# Patient Record
Sex: FEMALE | Race: BLACK OR AFRICAN AMERICAN | Employment: FULL TIME | URBAN - METROPOLITAN AREA
[De-identification: names, ages, dates, MRNs, and addresses within clinical notes are randomized per-mention and may not be internally consistent; named-entity substitution may affect disease eponyms.]

---

## 2021-06-24 ENCOUNTER — TELEPHONE (OUTPATIENT)
Dept: SURGERY | Age: 66
End: 2021-06-24

## 2021-06-24 NOTE — TELEPHONE ENCOUNTER
Per the order of Dr. Willie Delarosa, patient has been scheduled for EUS with possible ERCP on 7.9.2021. Patient provided with procedure information over the phone and informed that written information will be mailed to her. .  Patient instructed to please contact our office with any questions. Phone call placed to the office of Dr. Christina Henderson to inform them that patient is scheduled for her procedure. Surgery scheduling form faxed to [address] [place] [address] surgery scheduling and fax confirmation received. Dr. Willie Delarosa to enter orders.     Electronically signed by Lucius Ulloa on 6/24/21 at 2:48 PM EDT

## 2021-06-24 NOTE — TELEPHONE ENCOUNTER
Prior Authorization Form:      DEMOGRAPHICS:                     Patient Name:  Yenni De La Torre  Patient :  1955            Insurance:  Payor: Ming Garcia 150 / Plan: [facility] / Product Type: *No Product type* / Note: This is the primary coverage, but no account was found for this location or the patient's primary location. Insurance ID Number:    Payor/Plan Subscr  Sex Relation Sub. Ins. ID Effective Group Num   1.  [facility] 1955 Female Self [id number] 1/1/15 [id number]                                    [id number]         DIAGNOSIS & PROCEDURE:                       Procedure/Operation: EUS possible ERCP           CPT Code: 41534    Diagnosis:  Dilation of bile duct    ICD10 Code: K83.9    Location:  [address] [place] [address]    Surgeon:  Nate Chong    SCHEDULING INFORMATION:                          Date: 2021    Time: TBD              Anesthesia:  MAC/TIVA                                                       Status:  Outpatient        Special Comments:         Electronically signed by Jocelyne Martinez on 2021 at 2:48 PM

## 2021-07-06 ENCOUNTER — HOSPITAL ENCOUNTER (OUTPATIENT)
Dept: PREADMISSION TESTING | Age: 66
Discharge: HOME | End: 2021-07-06
Payer: COMMERCIAL

## 2021-07-06 VITALS
WEIGHT: 185 LBS | SYSTOLIC BLOOD PRESSURE: 179 MMHG | OXYGEN SATURATION: 98 % | TEMPERATURE: 96.9 F | RESPIRATION RATE: 16 BRPM | DIASTOLIC BLOOD PRESSURE: 84 MMHG | BODY MASS INDEX: 34.93 KG/M2 | HEIGHT: 61 IN | HEART RATE: 63 BPM

## 2021-07-06 DIAGNOSIS — Z01.818 PREOP TESTING: ICD-10-CM

## 2021-07-06 LAB
ALBUMIN SERPL-MCNC: 3.9 G/DL (ref 3.5–5.2)
ALP BLD-CCNC: 95 U/L (ref 35–104)
ALT SERPL-CCNC: 14 U/L (ref 0–32)
ANION GAP SERPL CALCULATED.3IONS-SCNC: 8 MMOL/L (ref 7–16)
AST SERPL-CCNC: 19 U/L (ref 0–31)
BILIRUB SERPL-MCNC: 0.3 MG/DL (ref 0–1.2)
BUN BLDV-MCNC: 28 MG/DL (ref 6–23)
CALCIUM SERPL-MCNC: 9.3 MG/DL (ref 8.6–10.2)
CHLORIDE BLD-SCNC: 108 MMOL/L (ref 98–107)
CO2: 23 MMOL/L (ref 22–29)
CREAT SERPL-MCNC: 2.3 MG/DL (ref 0.5–1)
EKG ATRIAL RATE: 65 BPM
EKG P AXIS: 38 DEGREES
EKG P-R INTERVAL: 122 MS
EKG Q-T INTERVAL: 452 MS
EKG QRS DURATION: 96 MS
EKG QTC CALCULATION (BAZETT): 470 MS
EKG R AXIS: -45 DEGREES
EKG T AXIS: 81 DEGREES
EKG VENTRICULAR RATE: 65 BPM
GFR AFRICAN AMERICAN: 26
GFR NON-AFRICAN AMERICAN: 26 ML/MIN/1.73
GLUCOSE BLD-MCNC: 136 MG/DL (ref 74–99)
HCT VFR BLD CALC: 36.5 % (ref 34–48)
HEMOGLOBIN: 11.4 G/DL (ref 11.5–15.5)
MCH RBC QN AUTO: 27 PG (ref 26–35)
MCHC RBC AUTO-ENTMCNC: 31.2 % (ref 32–34.5)
MCV RBC AUTO: 86.5 FL (ref 80–99.9)
PDW BLD-RTO: 14.1 FL (ref 11.5–15)
PLATELET # BLD: 258 E9/L (ref 130–450)
PMV BLD AUTO: 11.3 FL (ref 7–12)
POTASSIUM REFLEX MAGNESIUM: 4.6 MMOL/L (ref 3.5–5)
RBC # BLD: 4.22 E12/L (ref 3.5–5.5)
SODIUM BLD-SCNC: 139 MMOL/L (ref 132–146)
TOTAL PROTEIN: 7.2 G/DL (ref 6.4–8.3)
WBC # BLD: 4.4 E9/L (ref 4.5–11.5)

## 2021-07-06 PROCEDURE — 93005 ELECTROCARDIOGRAM TRACING: CPT | Performed by: ANESTHESIOLOGY

## 2021-07-06 PROCEDURE — 80053 COMPREHEN METABOLIC PANEL: CPT

## 2021-07-06 PROCEDURE — 85027 COMPLETE CBC AUTOMATED: CPT

## 2021-07-06 PROCEDURE — 36415 COLL VENOUS BLD VENIPUNCTURE: CPT

## 2021-07-06 RX ORDER — ISOSORBIDE DINITRATE 10 MG/1
10 TABLET ORAL 2 TIMES DAILY
COMMUNITY
End: 2022-04-13

## 2021-07-06 RX ORDER — SPIRONOLACTONE 25 MG/1
25 TABLET ORAL DAILY
COMMUNITY

## 2021-07-06 RX ORDER — ASPIRIN 81 MG/1
81 TABLET ORAL DAILY
COMMUNITY

## 2021-07-06 RX ORDER — BUMETANIDE 1 MG/1
1 TABLET ORAL PRN
COMMUNITY

## 2021-07-06 RX ORDER — HYDRALAZINE HYDROCHLORIDE 25 MG/1
25 TABLET, FILM COATED ORAL 2 TIMES DAILY
COMMUNITY
End: 2022-04-13

## 2021-07-06 ASSESSMENT — PAIN DESCRIPTION - PAIN TYPE: TYPE: CHRONIC PAIN

## 2021-07-06 ASSESSMENT — PAIN SCALES - GENERAL: PAINLEVEL_OUTOF10: 3

## 2021-07-06 ASSESSMENT — PAIN DESCRIPTION - LOCATION: LOCATION: ABDOMEN

## 2021-07-06 ASSESSMENT — PAIN DESCRIPTION - ORIENTATION: ORIENTATION: MID

## 2021-07-06 ASSESSMENT — PAIN DESCRIPTION - DESCRIPTORS: DESCRIPTORS: ACHING

## 2021-07-06 NOTE — PROGRESS NOTES
[address] [facility]                                                                                                                    PRE OP INSTRUCTIONS FOR  Amanda Robertson        Date: 7/6/2021    Date of surgery: 7/9/21   Arrival Time: Hospital will call you between 5pm and 7pm with your final arrival time for surgery    1. Do not eat or drink anything after midnight prior to surgery. This includes no water, chewing gum, mints or ice chips. 2. Take the following medications with a small sip of water on the morning of Surgery: isosorbide,metoprolol, hydralazine     3. Diabetics may take evening dose of insulin but none after midnight. If you feel symptomatic or low blood sugar morning of surgery drink 1-2 ounces of apple juice only. 4. Aspirin, Ibuprofen, Advil, Naproxen, Vitamin E and other Anti-inflammatory products should be stopped  before surgery  as directed by your physician. Take Tylenol only unless instructed otherwise by your surgeon. 5. Check with your Doctor regarding stopping Plavix, Coumadin, Lovenox, Eliquis, Effient, or other blood thinners. 6. Do not smoke,use illicit drugs and do not drink any alcoholic beverages 24 hours prior to surgery. 7. You may brush your teeth the morning of surgery. DO NOT SWALLOW WATER    8. You MUST make arrangements for a responsible adult to take you home after your surgery. You will not be allowed to leave alone or drive yourself home. It is strongly suggested someone stay with you the first 24 hrs. Your surgery will be cancelled if you do not have a ride home. 9. PEDIATRIC PATIENTS ONLY:  A parent/legal guardian must accompany a child scheduled for surgery and plan to stay at the hospital until the child is discharged. Please do not bring other children with you.     10. Please wear simple, loose fitting clothing to the hospital.  Do not bring valuables (money, credit cards, checkbooks, etc.) Do not wear any makeup (including no eye makeup) or nail polish on your fingers or toes. 11. DO NOT wear any jewelry or piercings on day of surgery. All body piercing jewelry must be removed. Shower the night before surgery with _x__Antibacterial soap /OSKAR WIPES________. May use deodorant. No creams lotions or powders from the neck down. 12. TOTAL JOINT REPLACEMENT/HYSTERECTOMY PATIENTS ONLY---Remember to bring Blood Bank bracelet to the hospital on the day of surgery. 13. If you have a Living Will and Durable Power of  for Healthcare, please bring in a copy. 14. If appropriate bring crutches, inspirex, WALKER, CANE etc...    13. Notify your Surgeon if you develop any illness between now and surgery time, cough, cold, fever, sore throat, nausea, vomiting, etc.  Please notify your surgeon if you experience dizziness, shortness of breath or blurred vision between now & the time of your surgery. 16. If you have ___dentures, they will be removed before going to the OR; we will provide you a container. If you wear ___contact lenses or ___glasses, they will be removed; please bring a case for them. 17. To provide excellent care visitors will be limited to 1 in the room at any given time. 18. Please bring picture ID and insurance card. 19. Sleep apnea patients need to bring CPAP AND SETTINGS to hospital on day of surgery. 20. During flu season no children under the age of 15 are permitted in the hospital for the safety of all patients. 21. Other Please check in at the information desk/main lobby. Please wear a mask. Please call AMBULATORY CARE if you have any further questions.    [address] [facility]     [address]

## 2021-07-09 ENCOUNTER — HOSPITAL ENCOUNTER (OUTPATIENT)
Dept: GENERAL RADIOLOGY | Age: 66
Discharge: HOME | End: 2021-07-11
Attending: SURGERY
Payer: COMMERCIAL

## 2021-07-09 ENCOUNTER — ANESTHESIA (OUTPATIENT)
Dept: OPERATING ROOM | Age: 66
End: 2021-07-09
Payer: COMMERCIAL

## 2021-07-09 ENCOUNTER — HOSPITAL ENCOUNTER (OUTPATIENT)
Age: 66
Setting detail: OUTPATIENT SURGERY
Discharge: HOME | End: 2021-07-09
Attending: SURGERY | Admitting: SURGERY
Payer: COMMERCIAL

## 2021-07-09 ENCOUNTER — ANESTHESIA EVENT (OUTPATIENT)
Dept: OPERATING ROOM | Age: 66
End: 2021-07-09
Payer: COMMERCIAL

## 2021-07-09 VITALS
RESPIRATION RATE: 20 BRPM | BODY MASS INDEX: 34.93 KG/M2 | DIASTOLIC BLOOD PRESSURE: 78 MMHG | TEMPERATURE: 96.5 F | OXYGEN SATURATION: 97 % | WEIGHT: 185 LBS | HEART RATE: 71 BPM | SYSTOLIC BLOOD PRESSURE: 149 MMHG | HEIGHT: 61 IN

## 2021-07-09 VITALS
RESPIRATION RATE: 14 BRPM | SYSTOLIC BLOOD PRESSURE: 89 MMHG | DIASTOLIC BLOOD PRESSURE: 45 MMHG | OXYGEN SATURATION: 90 %

## 2021-07-09 DIAGNOSIS — K83.1 BILIARY OBSTRUCTION: ICD-10-CM

## 2021-07-09 LAB — METER GLUCOSE: 134 MG/DL (ref 74–99)

## 2021-07-09 PROCEDURE — C1769 GUIDE WIRE: HCPCS | Performed by: SURGERY

## 2021-07-09 PROCEDURE — 2580000003 HC RX 258: Performed by: ANESTHESIOLOGY

## 2021-07-09 PROCEDURE — 6360000002 HC RX W HCPCS: Performed by: NURSE ANESTHETIST, CERTIFIED REGISTERED

## 2021-07-09 PROCEDURE — 3700000000 HC ANESTHESIA ATTENDED CARE: Performed by: SURGERY

## 2021-07-09 PROCEDURE — 2720000010 HC SURG SUPPLY STERILE: Performed by: SURGERY

## 2021-07-09 PROCEDURE — 7100000010 HC PHASE II RECOVERY - FIRST 15 MIN: Performed by: SURGERY

## 2021-07-09 PROCEDURE — C1753 CATH, INTRAVAS ULTRASOUND: HCPCS | Performed by: SURGERY

## 2021-07-09 PROCEDURE — 3700000001 HC ADD 15 MINUTES (ANESTHESIA): Performed by: SURGERY

## 2021-07-09 PROCEDURE — 43238 EGD US FINE NEEDLE BX/ASPIR: CPT | Performed by: SURGERY

## 2021-07-09 PROCEDURE — 3600007513: Performed by: SURGERY

## 2021-07-09 PROCEDURE — 7100000011 HC PHASE II RECOVERY - ADDTL 15 MIN: Performed by: SURGERY

## 2021-07-09 PROCEDURE — 3600007503: Performed by: SURGERY

## 2021-07-09 PROCEDURE — 2709999900 HC NON-CHARGEABLE SUPPLY: Performed by: SURGERY

## 2021-07-09 PROCEDURE — 99203 OFFICE O/P NEW LOW 30 MIN: CPT | Performed by: SURGERY

## 2021-07-09 PROCEDURE — 82962 GLUCOSE BLOOD TEST: CPT

## 2021-07-09 RX ORDER — SODIUM CHLORIDE 9 MG/ML
INJECTION, SOLUTION INTRAVENOUS CONTINUOUS
Status: DISCONTINUED | OUTPATIENT
Start: 2021-07-09 | End: 2021-07-09 | Stop reason: HOSPADM

## 2021-07-09 RX ORDER — PROPOFOL 10 MG/ML
INJECTION, EMULSION INTRAVENOUS CONTINUOUS PRN
Status: DISCONTINUED | OUTPATIENT
Start: 2021-07-09 | End: 2021-07-09 | Stop reason: SDUPTHER

## 2021-07-09 RX ORDER — SODIUM CHLORIDE 9 MG/ML
25 INJECTION, SOLUTION INTRAVENOUS PRN
Status: DISCONTINUED | OUTPATIENT
Start: 2021-07-09 | End: 2021-07-09 | Stop reason: HOSPADM

## 2021-07-09 RX ORDER — MIDAZOLAM HYDROCHLORIDE 1 MG/ML
INJECTION INTRAMUSCULAR; INTRAVENOUS PRN
Status: DISCONTINUED | OUTPATIENT
Start: 2021-07-09 | End: 2021-07-09 | Stop reason: SDUPTHER

## 2021-07-09 RX ORDER — SODIUM CHLORIDE 0.9 % (FLUSH) 0.9 %
5-40 SYRINGE (ML) INJECTION EVERY 12 HOURS SCHEDULED
Status: DISCONTINUED | OUTPATIENT
Start: 2021-07-09 | End: 2021-07-09 | Stop reason: HOSPADM

## 2021-07-09 RX ORDER — FENTANYL CITRATE 50 UG/ML
INJECTION, SOLUTION INTRAMUSCULAR; INTRAVENOUS PRN
Status: DISCONTINUED | OUTPATIENT
Start: 2021-07-09 | End: 2021-07-09 | Stop reason: SDUPTHER

## 2021-07-09 RX ORDER — SODIUM CHLORIDE 0.9 % (FLUSH) 0.9 %
5-40 SYRINGE (ML) INJECTION PRN
Status: DISCONTINUED | OUTPATIENT
Start: 2021-07-09 | End: 2021-07-09 | Stop reason: HOSPADM

## 2021-07-09 RX ADMIN — PROPOFOL INJECTABLE EMULSION 100 MCG/KG/MIN: 10 INJECTION, EMULSION INTRAVENOUS at 12:26

## 2021-07-09 RX ADMIN — SODIUM CHLORIDE: 9 INJECTION, SOLUTION INTRAVENOUS at 11:51

## 2021-07-09 RX ADMIN — FENTANYL CITRATE 50 MCG: 50 INJECTION, SOLUTION INTRAMUSCULAR; INTRAVENOUS at 12:26

## 2021-07-09 RX ADMIN — FENTANYL CITRATE 50 MCG: 50 INJECTION, SOLUTION INTRAMUSCULAR; INTRAVENOUS at 12:31

## 2021-07-09 RX ADMIN — MIDAZOLAM 2 MG: 1 INJECTION INTRAMUSCULAR; INTRAVENOUS at 12:21

## 2021-07-09 ASSESSMENT — PULMONARY FUNCTION TESTS
PIF_VALUE: 1
PIF_VALUE: 3
PIF_VALUE: 1
PIF_VALUE: 2
PIF_VALUE: 1
PIF_VALUE: 0
PIF_VALUE: 1

## 2021-07-09 ASSESSMENT — PAIN SCALES - GENERAL
PAINLEVEL_OUTOF10: 0
PAINLEVEL_OUTOF10: 0

## 2021-07-09 ASSESSMENT — LIFESTYLE VARIABLES: SMOKING_STATUS: 0

## 2021-07-09 ASSESSMENT — PAIN DESCRIPTION - DESCRIPTORS: DESCRIPTORS: DISCOMFORT

## 2021-07-09 ASSESSMENT — PAIN - FUNCTIONAL ASSESSMENT: PAIN_FUNCTIONAL_ASSESSMENT: 0-10

## 2021-07-09 NOTE — ANESTHESIA PRE PROCEDURE
Department of Anesthesiology  Preprocedure Note       Name:  Mckay Berg   Age:  77 y.o.  :  1955                                          MRN:  [id number]         Date:  2021      Surgeon: Grace Santamaria):  China Ribeiro MD    Procedure: Procedure(s):  EGD W/EUS FNA, POSSIBLE ERCP (CPT 94210)  ERCP ENDOSCOPIC RETROGRADE CHOLANGIOPANCREATOGRAPHY    Medications prior to admission:   Prior to Admission medications    Medication Sig Start Date End Date Taking? Authorizing Provider   NONFORMULARY Supplement for hot flashes    Historical Provider, MD   aspirin 81 MG EC tablet Take 81 mg by mouth daily    Historical Provider, MD   Sacubitril-Valsartan (ENTRESTO PO) Take by mouth 2 times daily    Historical Provider, MD   metoprolol tartrate (LOPRESSOR) 25 MG tablet Take 25 mg by mouth 2 times daily    Historical Provider, MD   bumetanide (BUMEX) 1 MG tablet Take 1 mg by mouth as needed    Historical Provider, MD   spironolactone (ALDACTONE) 25 MG tablet Take 25 mg by mouth daily    Historical Provider, MD   isosorbide dinitrate (ISORDIL) 10 MG tablet Take 10 mg by mouth 2 times daily    Historical Provider, MD   hydrALAZINE (APRESOLINE) 25 MG tablet Take 25 mg by mouth 2 times daily    Historical Provider, MD   metFORMIN ER (GLUCOPHAGE-XR) 500 MG XR tablet Take 1 tablet by mouth 2 times daily  16   Historical Provider, MD   glimepiride (AMARYL) 4 MG tablet Take 1 tablet by mouth 2 times daily (before meals) 16  Jelena Saini MD   simvastatin (ZOCOR) 40 MG tablet Take 1 tablet by mouth nightly 11/6/15 11/21/16  Jelena Saini MD       Current medications:    No current facility-administered medications for this encounter. Allergies:     Allergies   Allergen Reactions    Penicillins Swelling     Mouth, hands and fingers       Problem List:    Patient Active Problem List   Diagnosis Code    Diabetes mellitus ([facility] E11.9    Hypertension I10    Asthma J45.909    Hyperlipidemia E78.5    Vitamin D deficiency E55.9    Microalbuminuria due to type 2 diabetes mellitus ([facility] E11.29, R80.9    Enteritis K52.9    Intractable vomiting R11.10       Past Medical History:        Diagnosis Date    Arthritis     Asthma     Diabetes mellitus ([facility]     GERD (gastroesophageal reflux disease)     Hyperlipidemia     Hypertension     Immune deficiency disorder ([facility]     MI, old 2018    Microalbuminuria due to type 2 diabetes mellitus ([facility] 3/3/2016    Vitamin D deficiency 3/3/2016       Past Surgical History:        Procedure Laterality Date    BREAST SURGERY      reduction    lumpectomy    COLONOSCOPY      FRACTURE SURGERY      right thumb and tailbone    HYSTERECTOMY      PACEMAKER PLACEMENT         Social History:    Social History     Tobacco Use    Smoking status: Never Smoker    Smokeless tobacco: Never Used   Substance Use Topics    Alcohol use: Yes     Alcohol/week: 1.0 standard drinks     Types: 1 Glasses of wine per week     Comment: social                                Counseling given: Not Answered      Vital Signs (Current): There were no vitals filed for this visit.                                            BP Readings from Last 3 Encounters:   07/06/21 (!) 179/84   11/22/16 138/78   03/30/16 (!) 170/98       NPO Status:                                                                                 BMI:   Wt Readings from Last 3 Encounters:   07/06/21 185 lb (83.9 kg)   11/22/16 196 lb 3.2 oz (89 kg)   03/30/16 186 lb 6.4 oz (84.6 kg)     There is no height or weight on file to calculate BMI.    CBC:   Lab Results   Component Value Date    WBC 4.4 07/06/2021    RBC 4.22 07/06/2021    HGB 11.4 07/06/2021    HCT 36.5 07/06/2021    MCV 86.5 07/06/2021    RDW 14.1 07/06/2021     07/06/2021       CMP:   Lab Results   Component Value Date     07/06/2021    K 4.6 07/06/2021     07/06/2021    CO2 23 07/06/2021    BUN 28 07/06/2021    CREATININE 2.3 07/06/2021    GFRAA 26 07/06/2021    LABGLOM 26 07/06/2021    GLUCOSE 136 07/06/2021    PROT 7.2 07/06/2021    CALCIUM 9.3 07/06/2021    BILITOT 0.3 07/06/2021    ALKPHOS 95 07/06/2021    AST 19 07/06/2021    ALT 14 07/06/2021       POC Tests: No results for input(s): POCGLU, POCNA, POCK, POCCL, POCBUN, POCHEMO, POCHCT in the last 72 hours. Coags: No results found for: PROTIME, INR, APTT    HCG (If Applicable): No results found for: PREGTESTUR, PREGSERUM, HCG, HCGQUANT     ABGs: No results found for: PHART, PO2ART, CBB7VRL, ALG2CKO, BEART, P9BFUCKF     Type & Screen (If Applicable):  No results found for: LABABO, LABRH    Drug/Infectious Status (If Applicable):  No results found for: HIV, HEPCAB    COVID-19 Screening (If Applicable): No results found for: COVID19        Anesthesia Evaluation  Patient summary reviewed no history of anesthetic complications:   Airway: Mallampati: II  TM distance: >3 FB   Neck ROM: full  Mouth opening: > = 3 FB Dental: normal exam         Pulmonary: breath sounds clear to auscultation  (+) asthma:     (-) not a current smoker                           Cardiovascular:    (+) hypertension:, pacemaker: pacemaker and AICD, past MI:, CHF:, hyperlipidemia        Rhythm: regular  Rate: normal                    Neuro/Psych:   Negative Neuro/Psych ROS              GI/Hepatic/Renal:   (+) GERD:,          ROS comment: Enteritis  Intractable vomiting. Endo/Other:    (+) DiabetesType II DM, , .                  ROS comment: Immune deficiency disorder Abdominal:   (+) obese,           Vascular: negative vascular ROS. Other Findings:           Anesthesia Plan      MAC     ASA 3       Induction: intravenous. Anesthetic plan and risks discussed with patient. Plan discussed with CRNA.                   Adonis Villegas MD   7/9/2021

## 2021-07-09 NOTE — OP NOTE
Endoscopic Ultrasound Procedure Note    Date of Procedure: 7/9/2021    Pre-procedure Diagnosis: dilated cbd    Post-procedure Diagnosis: dilated cbd    Physician: Pepito Morley MD    Assistant: None    Estimated Blood Loss: Estimated amount of blood loss is none    Anesthesia: LMAC     Complications: None    Indications and History:  The patient is a 77 y.o. female. The risks, benefits, complications, treatment options and expected outcomes were discussed with the patient. The possibilities of reaction to medication, pulmonary aspiration, perforation of the gastrointestinal tract, bleeding requiring transfusion or operation, respiratory failure requiring placement on a ventilator and failure to diagnose a condition were discussed with the patient who freely signed the consent. Description of Procedure: The patient was taken to the endoscopy suite, identified as Gautam Xavier and the procedure verified as Endoscopic Ultrasound (EUS). A Time Out was held and the above information confirmed. The patient was positioned in the left lateral position with an oral bite block and anesthesia was provided for sedation and comfort. The echoendoscope was passed to the duodenum. EGD/EUS findings:   Esophagus: normal   Stomach: normal   Duodenum: normal   Pancreas: normal.  Mildly dilated pancreatic duct to about 3.5 mm in the pancreatic head. Normal tapering of the duct throughout the neck body and tail of the pancreas. No evidence of solid or cystic masses throughout the pancreas. Bile Duct: normal.  Specifically, there is no evidence of external compression, neoplasm, stricture, or stone in the common bile duct. The common bile duct is normal in caliber and measures about 6 mm throughout the duct. Gallbladder: Gallbladder polyps. No stones      Specimens:  1. none    The Patient was taken to the Endoscopy Recovery area in satisfactory condition.       Electronically signed by Pepito Morley MD on 7/9/2021 at 12:54 PM

## 2021-07-09 NOTE — ANESTHESIA POSTPROCEDURE EVALUATION
Department of Anesthesiology  Postprocedure Note    Patient: Jarvis Roberson  MRN: [id number]  YOB: 1955  Date of evaluation: 7/9/2021  Time:  1:50 PM     Procedure Summary     Date: 07/09/21 Room / Location: [address] [place] [address] [address] [employer] [facility]    Anesthesia Start: 9401 Anesthesia Stop: 7862    Procedure: EGD ESOPHAGOGASTRODUODENOSCOPY ULTRASOUND (N/A ) Diagnosis: (DILATION OF BILE DUCT)    Surgeons: Renan Polanco MD Responsible Provider: Mark Palafox MD    Anesthesia Type: MAC ASA Status: 3          Anesthesia Type: MAC    Shannan Phase I: Shannan Score: 10    Shannan Phase II: Shannan Score: 10    Last vitals: Reviewed and per EMR flowsheets.        Anesthesia Post Evaluation    Patient location during evaluation: PACU  Patient participation: complete - patient participated  Level of consciousness: awake  Airway patency: patent  Nausea & Vomiting: no nausea and no vomiting  Complications: no  Cardiovascular status: hemodynamically stable  Respiratory status: acceptable  Hydration status: stable

## 2021-07-09 NOTE — H&P
General Surgery History and Physical  [facility]    Patient's Name/Date of Birth: Yoon Franco / 1955    Date: July 9, 2021     Surgeon: Nito Gooden MD    PCP: Darlys Meckel, DO     Chief Complaint: dilated CBD    HPI:   Yoon Franco is a 77 y.o. female who presents for evaluation of dilated CBD. She was admitted with crushing chest pain and imaging revealed dilated CBD. She was referred for EUS with possible ercp. She denies nausea, vomiting, constipation, diarrhea, headache, chest pain, shortness of breath, fevers, chills. Patient Active Problem List   Diagnosis    Diabetes mellitus ([address]    Hypertension    Asthma    Hyperlipidemia    Vitamin D deficiency    Microalbuminuria due to type 2 diabetes mellitus ([address]    Enteritis    Intractable vomiting       Past Medical History:   Diagnosis Date    Arthritis     Asthma     Diabetes mellitus ([address]     GERD (gastroesophageal reflux disease)     Hyperlipidemia     Hypertension     Immune deficiency disorder ([address]     MI, old 2018    Microalbuminuria due to type 2 diabetes mellitus ([facility] 3/3/2016    Vitamin D deficiency 3/3/2016       Past Surgical History:   Procedure Laterality Date    BREAST SURGERY      reduction    lumpectomy    COLONOSCOPY      FRACTURE SURGERY      right thumb and tailbone    HYSTERECTOMY      PACEMAKER PLACEMENT         Allergies   Allergen Reactions    Penicillins Swelling     Mouth, hands and fingers       The patient has a family history that is negative for severe cardiovascular or respiratory issues, negative for reaction to anesthesia. Time spent reviewing past medical, surgical, social and family history, vitals, nursing assessment and images. No changes from above documented history.     Social History     Socioeconomic History    Marital status: Single     Spouse name: Not on file    Number of children: Not on file    Years of education: Not on file    Highest education level: Not on file   Occupational History    Occupation: Supervisor   Tobacco Use    Smoking status: Never Smoker    Smokeless tobacco: Never Used   Substance and Sexual Activity    Alcohol use: Yes     Alcohol/week: 1.0 standard drinks     Types: 1 Glasses of wine per week     Comment: social    Drug use: No    Sexual activity: Yes     Partners: Male   Other Topics Concern    Not on file   Social History Narrative    Not on file     Social Determinants of Health     Financial Resource Strain:     Difficulty of Paying Living Expenses:    Food Insecurity:     Worried About Running Out of Food in the Last Year:     [address] in the Last Year:    Transportation Needs:     Lack of Transportation (Medical):  Lack of Transportation (Non-Medical):    Physical Activity:     Days of Exercise per Week:     Minutes of Exercise per Session:    Stress:     Feeling of Stress :    Social Connections:     Frequency of Communication with Friends and Family:     Frequency of Social Gatherings with Friends and Family:     Attends [employer] Services:     Active Member of Clubs or Organizations:     Attends Club or Organization Meetings:     Marital Status:    Intimate Partner Violence:     Fear of Current or Ex-Partner:     Emotionally Abused:     Physically Abused:     Sexually Abused:        I have reviewed relevant labs from this admission and interpretation is included in my assessment and plan    Review of Systems    A complete 10 system review was performed and are otherwise negative unless mentioned in the above HPI. Specific negatives are listed below but may not include all those reviewed.     General ROS: negative obtundation, AMS  ENT ROS: negative rhinorrhea, epistaxis  Allergy and Immunology ROS: negative itchy/watery eyes or nasal congestion  Hematological and Lymphatic ROS: negative spontaneous bleeding or bruising  Endocrine ROS: negative  lethargy, mood swings, palpitations or polydipsia/polyuria  Respiratory ROS: negative sputum changes, stridor, tachypnea or wheezing  Cardiovascular ROS: negative for - loss of consciousness, murmur or orthopnea  Gastrointestinal ROS: negative for - hematochezia or hematemesis  Genito-Urinary ROS: negative for -  genital discharge or hematuria  Musculoskeletal ROS: negative for - focal weakness, gangrene  Psych/Neuro ROS: negative for - visual or auditory hallucinations, suicidal ideation    Physical exam:   BP (!) 189/88   Pulse 80   Temp 97.5 °F (36.4 °C) (Infrared)   Resp 16   Ht 5' 1\" (1.549 m)   Wt 185 lb (83.9 kg)   SpO2 95%   BMI 34.96 kg/m²   General appearance:  NAD, appears stated age  Head: NCAT, PERRLA, EOMI, red conjunctiva  Neck: supple, no masses, trachea midline  Lungs: Equal chest rise bilateral, no retractions, no wheezing  Heart: Reg rate  Abdomen: soft, nontender , nondistended  Skin; warm and dry, no cyanosis  Gu: no cva tenderness  Extremities: atraumatic, no focal motor deficits, no open wounds  Psych: No tremor, visual hallucinations      Radiology: RUQ US: dilated CBD    Assessment:  Oni Jorgensen is a 77 y.o. female with dilated CBD  Patient Active Problem List   Diagnosis    Diabetes mellitus ([address]    Hypertension    Asthma    Hyperlipidemia    Vitamin D deficiency    Microalbuminuria due to type 2 diabetes mellitus (HCC)    Enteritis    Intractable vomiting         Plan:  Proceed with EUS  -The procedure, risks, benefits and alternatives were discussed with patient. she  agrees to proceed.         Homar Noel MD  12:22 PM  7/9/2021

## 2021-07-09 NOTE — PROGRESS NOTES
7/9/21 1350 reviewed discharge instructions with pt and her brother lizzy.  Both verbalized understanding, signed in agreement and given copy. betsy sotelo

## 2021-10-22 ENCOUNTER — TELEPHONE (OUTPATIENT)
Dept: SURGERY | Age: 66
End: 2021-10-22

## 2021-11-09 ENCOUNTER — INITIAL CONSULT (OUTPATIENT)
Dept: SURGERY | Age: 66
End: 2021-11-09
Payer: COMMERCIAL

## 2021-11-09 VITALS
TEMPERATURE: 97.8 F | HEART RATE: 80 BPM | SYSTOLIC BLOOD PRESSURE: 157 MMHG | BODY MASS INDEX: 34.74 KG/M2 | DIASTOLIC BLOOD PRESSURE: 92 MMHG | RESPIRATION RATE: 18 BRPM | WEIGHT: 184 LBS | HEIGHT: 61 IN | OXYGEN SATURATION: 98 %

## 2021-11-09 DIAGNOSIS — K83.8 DILATED BILE DUCT: Primary | ICD-10-CM

## 2021-11-09 PROCEDURE — G8484 FLU IMMUNIZE NO ADMIN: HCPCS | Performed by: SURGERY

## 2021-11-09 PROCEDURE — 4040F PNEUMOC VAC/ADMIN/RCVD: CPT | Performed by: SURGERY

## 2021-11-09 PROCEDURE — 1036F TOBACCO NON-USER: CPT | Performed by: SURGERY

## 2021-11-09 PROCEDURE — G8400 PT W/DXA NO RESULTS DOC: HCPCS | Performed by: SURGERY

## 2021-11-09 PROCEDURE — 1090F PRES/ABSN URINE INCON ASSESS: CPT | Performed by: SURGERY

## 2021-11-09 PROCEDURE — G8427 DOCREV CUR MEDS BY ELIG CLIN: HCPCS | Performed by: SURGERY

## 2021-11-09 PROCEDURE — G8419 CALC BMI OUT NRM PARAM NOF/U: HCPCS | Performed by: SURGERY

## 2021-11-09 PROCEDURE — 1123F ACP DISCUSS/DSCN MKR DOCD: CPT | Performed by: SURGERY

## 2021-11-09 PROCEDURE — 3017F COLORECTAL CA SCREEN DOC REV: CPT | Performed by: SURGERY

## 2021-11-09 PROCEDURE — 99213 OFFICE O/P EST LOW 20 MIN: CPT | Performed by: SURGERY

## 2021-11-09 RX ORDER — IPRATROPIUM BROMIDE 42 UG/1
SPRAY, METERED NASAL
COMMUNITY
Start: 2021-10-05

## 2021-11-09 RX ORDER — GLIPIZIDE 10 MG/1
TABLET, FILM COATED, EXTENDED RELEASE ORAL
COMMUNITY
Start: 2021-10-21 | End: 2022-04-13

## 2021-11-09 RX ORDER — METOPROLOL SUCCINATE 50 MG/1
TABLET, EXTENDED RELEASE ORAL
COMMUNITY
Start: 2020-12-24

## 2021-11-09 RX ORDER — ORAL SEMAGLUTIDE 3 MG/1
TABLET ORAL
COMMUNITY
End: 2022-04-13

## 2021-11-09 RX ORDER — EMPAGLIFLOZIN 25 MG/1
TABLET, FILM COATED ORAL
COMMUNITY
End: 2022-04-13

## 2021-11-09 NOTE — PROGRESS NOTES
General Surgery History and Physical  [facility]    Patient's Name/Date of Birth: Soco Garcia / 1955    Date: November 9, 2021     Surgeon: Harmony Ceron MD    PCP: Debra Lewis DO     Chief Complaint: Dilated bile duct    HPI:   Soco Garcia is a 77 y.o. female who presents for evaluation of epigastric or right upper quadrant abdominal pain for several months. This was pretty severe 6 months ago and had gotten better however the pain is not coming back. She has had 2 ultrasounds performed in the last 6 months that both reveal a distended gallbladder with a dilated common bile duct to 10 mm. No evidence of stones or other abnormality on the ultrasound. She reports the pain is worse when she bends down however she does report some bloating and gas after eating. Patient Active Problem List   Diagnosis    Diabetes mellitus ([address]    Hypertension    Asthma    Hyperlipidemia    Vitamin D deficiency    Microalbuminuria due to type 2 diabetes mellitus ([address]    Enteritis    Intractable vomiting    Dilated cbd, acquired       Past Medical History:   Diagnosis Date    Arthritis     Asthma     Diabetes mellitus ([address]     GERD (gastroesophageal reflux disease)     Hyperlipidemia     Hypertension     Immune deficiency disorder ([address]     MI, old 2018    Microalbuminuria due to type 2 diabetes mellitus ([address] 03/03/2016    Mitral valve regurgitation     Vitamin D deficiency 03/03/2016       Past Surgical History:   Procedure Laterality Date    BREAST SURGERY      reduction    lumpectomy    COLONOSCOPY      FRACTURE SURGERY      right thumb and tailbone    HYSTERECTOMY      PACEMAKER PLACEMENT      UPPER GASTROINTESTINAL ENDOSCOPY N/A 7/9/2021    EGD ESOPHAGOGASTRODUODENOSCOPY ULTRASOUND performed by Harmony Ceron MD at [facility]   Allergen Reactions    Latex      Other reaction(s):  Other: See Comments  Some issues w latex condoms, although cyanosis  Gu: no cva tenderness  Extremities: atraumatic, no focal motor deficits, no open wounds  Psych: No tremor, visual hallucinations      Radiology: I reviewed relevant abdominal imaging from this admission and that available in the EMR including RUQ from June and October 2021.  My assessment is dilated common bile duct    Assessment:  Bradford Rosenbaum is a 77 y.o. female with dilated common bile duct, rule out choledocholithiasis or other ampullary abnormality  Patient Active Problem List   Diagnosis    Diabetes mellitus ([address]    Hypertension    Asthma    Hyperlipidemia    Vitamin D deficiency    Microalbuminuria due to type 2 diabetes mellitus (HCC)    Enteritis    Intractable vomiting    Dilated cbd, acquired         Plan:  Check MRCP  Follow-up with me after MRCP  Patient understands that if pain returns and is severe she develops jaundice that she will go to the emergency room    Jefe Cho MD  8:41 AM  11/9/2021

## 2021-12-03 ENCOUNTER — HOSPITAL ENCOUNTER (OUTPATIENT)
Age: 66
Discharge: HOME | End: 2021-12-03
Payer: COMMERCIAL

## 2021-12-03 LAB
ANION GAP SERPL CALCULATED.3IONS-SCNC: 12 MMOL/L (ref 7–16)
BUN BLDV-MCNC: 38 MG/DL (ref 6–23)
CALCIUM SERPL-MCNC: 8.7 MG/DL (ref 8.6–10.2)
CHLORIDE BLD-SCNC: 104 MMOL/L (ref 98–107)
CO2: 24 MMOL/L (ref 22–29)
CREAT SERPL-MCNC: 2 MG/DL (ref 0.5–1)
GFR AFRICAN AMERICAN: 30
GFR NON-AFRICAN AMERICAN: 30 ML/MIN/1.73
GLUCOSE BLD-MCNC: 182 MG/DL (ref 74–99)
POTASSIUM SERPL-SCNC: 4.5 MMOL/L (ref 3.5–5)
SODIUM BLD-SCNC: 140 MMOL/L (ref 132–146)

## 2021-12-03 PROCEDURE — 36415 COLL VENOUS BLD VENIPUNCTURE: CPT

## 2021-12-03 PROCEDURE — 80048 BASIC METABOLIC PNL TOTAL CA: CPT

## 2021-12-06 ENCOUNTER — HOSPITAL ENCOUNTER (OUTPATIENT)
Dept: MRI IMAGING | Age: 66
Discharge: HOME | End: 2021-12-08
Payer: COMMERCIAL

## 2021-12-06 DIAGNOSIS — K83.8 DILATED BILE DUCT: ICD-10-CM

## 2021-12-06 PROCEDURE — A9577 INJ MULTIHANCE: HCPCS | Performed by: RADIOLOGY

## 2021-12-06 PROCEDURE — 6360000004 HC RX CONTRAST MEDICATION: Performed by: RADIOLOGY

## 2021-12-06 PROCEDURE — 74183 MRI ABD W/O CNTR FLWD CNTR: CPT

## 2021-12-06 RX ADMIN — GADOBENATE DIMEGLUMINE 17 ML: 529 INJECTION, SOLUTION INTRAVENOUS at 13:44

## 2021-12-06 NOTE — PROGRESS NOTES
Patient with pacemaker needed to be monitored during MRI. Patient monitored during test, vital signs stable. Patient tolerated well.

## 2021-12-14 ENCOUNTER — OFFICE VISIT (OUTPATIENT)
Dept: SURGERY | Age: 66
End: 2021-12-14
Payer: COMMERCIAL

## 2021-12-14 VITALS
HEIGHT: 61 IN | WEIGHT: 184 LBS | RESPIRATION RATE: 18 BRPM | OXYGEN SATURATION: 97 % | BODY MASS INDEX: 34.74 KG/M2 | DIASTOLIC BLOOD PRESSURE: 90 MMHG | SYSTOLIC BLOOD PRESSURE: 170 MMHG | TEMPERATURE: 97.2 F | HEART RATE: 79 BPM

## 2021-12-14 DIAGNOSIS — R10.13 EPIGASTRIC PAIN: Primary | ICD-10-CM

## 2021-12-14 PROCEDURE — 1036F TOBACCO NON-USER: CPT | Performed by: SURGERY

## 2021-12-14 PROCEDURE — 3017F COLORECTAL CA SCREEN DOC REV: CPT | Performed by: SURGERY

## 2021-12-14 PROCEDURE — 1090F PRES/ABSN URINE INCON ASSESS: CPT | Performed by: SURGERY

## 2021-12-14 PROCEDURE — G8417 CALC BMI ABV UP PARAM F/U: HCPCS | Performed by: SURGERY

## 2021-12-14 PROCEDURE — G8427 DOCREV CUR MEDS BY ELIG CLIN: HCPCS | Performed by: SURGERY

## 2021-12-14 PROCEDURE — 1123F ACP DISCUSS/DSCN MKR DOCD: CPT | Performed by: SURGERY

## 2021-12-14 PROCEDURE — 4040F PNEUMOC VAC/ADMIN/RCVD: CPT | Performed by: SURGERY

## 2021-12-14 PROCEDURE — G8400 PT W/DXA NO RESULTS DOC: HCPCS | Performed by: SURGERY

## 2021-12-14 PROCEDURE — 99212 OFFICE O/P EST SF 10 MIN: CPT | Performed by: SURGERY

## 2021-12-14 PROCEDURE — G8484 FLU IMMUNIZE NO ADMIN: HCPCS | Performed by: SURGERY

## 2021-12-14 NOTE — PROGRESS NOTES
General Surgery Office Note  [facility] General Surgery  Consandre OMARI Dominguez MD    Patient's Name/Date of Birth: Alisha Vicente / 1955    Date: December 14, 2021     Surgeon: Dalia Dominguez MD    Chief Complaint:   Chief Complaint   Patient presents with    Follow-up     follow up after MRCP       Patient Active Problem List   Diagnosis    Diabetes mellitus ([address]    Hypertension    Asthma    Hyperlipidemia    Vitamin D deficiency    Microalbuminuria due to type 2 diabetes mellitus ([address]    Enteritis    Intractable vomiting    Dilated cbd, acquired       Subjective: Has persistent epigastric abdominal pain. MRI of the abdomen did not reveal any filling defect or soft tissue mass in the distal common bile duct of the pancreatic head. Has never had HIDA scan or upper endoscopy. Objective:  BP (!) 170/90 (Site: Left Upper Arm, Position: Sitting, Cuff Size: Medium Adult)   Pulse 79   Temp 97.2 °F (36.2 °C) (Temporal)   Resp 18   Ht 5' 1\" (1.549 m)   Wt 184 lb (83.5 kg)   SpO2 97%   BMI 34.77 kg/m²   Labs:  No results for input(s): WBC, HGB, HCT in the last 72 hours. Invalid input(s): PLR  Lab Results   Component Value Date    CREATININE 2.0 (H) 12/03/2021    BUN 38 (H) 12/03/2021     12/03/2021    K 4.5 12/03/2021     12/03/2021    CO2 24 12/03/2021     No results for input(s): LIPASE, AMYLASE in the last 72 hours.   CBC:   Lab Results   Component Value Date    WBC 4.4 07/06/2021    RBC 4.22 07/06/2021    HGB 11.4 07/06/2021    HCT 36.5 07/06/2021    MCV 86.5 07/06/2021    MCH 27.0 07/06/2021    MCHC 31.2 07/06/2021    RDW 14.1 07/06/2021     07/06/2021    MPV 11.3 07/06/2021     CMP:    Lab Results   Component Value Date     12/03/2021    K 4.5 12/03/2021    K 4.6 07/06/2021     12/03/2021    CO2 24 12/03/2021    BUN 38 12/03/2021    CREATININE 2.0 12/03/2021    GFRAA 30 12/03/2021    LABGLOM 30 12/03/2021    GLUCOSE 182 12/03/2021    PROT 7.2 07/06/2021    LABALBU 3.9 07/06/2021    CALCIUM 8.7 12/03/2021    BILITOT 0.3 07/06/2021    ALKPHOS 95 07/06/2021    AST 19 07/06/2021    ALT 14 07/06/2021       General appearance: AA, NAD  HEENT: NCAT, PERRLA, EOMI  Lungs: Clear, equal rise bilateral  Heart: Reg  Abdomen: soft, nondistended, nontender,   Skin: No lesions   Psych: No distress, conversive, no hallucinations  : No ulcers or lesions  Rectal: No bleeding    A complete 10 system review was performed and are otherwise negative unless mentioned in the above HPI. Specific negatives are listed below but may not include all those reviewed. General ROS: negative obtundation, AMS  ENT ROS: negative rhinorrhea, epistaxis  Allergy and Immunology ROS: negative itchy/watery eyes or nasal congestion  Hematological and Lymphatic ROS: negative spontaneous bleeding or bruising  Endocrine ROS: negative  lethargy, mood swings, palpitations or polydipsia/polyuria  Respiratory ROS: negative sputum changes, stridor, tachypnea or wheezing  Cardiovascular ROS: negative for - loss of consciousness, murmur or orthopnea  Gastrointestinal ROS: negative for - hematochezia or hematemesis  Genito-Urinary ROS: negative for -  genital discharge or hematuria  Musculoskeletal ROS: negative for - focal weakness, gangrene  Psych/Neuro ROS: negative for - visual or auditory hallucinations, suicidal ideation      Time spent reviewing past medical, surgical, social and family history, vitals, nursing assessment and images. Imaging:  MRI abdomen  Impression   1. Mild extrahepatic and minimal to mild intrahepatic biliary dilation are   unchanged compared to the prior studies from 2016, suggesting that this is   normal for the patient.  No additional follow-up is recommended unless there   are clinical findings of cholestasis, in which case ERCP could be considered.    2. Findings suggestive of primary hemochromatosis with evaluation of the   liver limited by superimposed steatosis of at least mild (but potentially   greater) severity.  No associated findings of cirrhosis.          Pathology: n/a    Assessment/Plan:  Bradford Rosenbaum is a 77 y.o. female epigastric abdominal pain, dilated common bile duct, rule out biliary dyskinesia versus sphincter of Oddi dysfunction    Check HIDA scan with CCK  Follow-up after HIDA scan  Also discussed upper endoscopy to rule out hiatal hernia if symptoms persist and HIDA is negative    Physician Signature: Electronically signed by Dr. Rodriguez Camera  12/14/2021  8:27 AM

## 2021-12-30 ENCOUNTER — HOSPITAL ENCOUNTER (OUTPATIENT)
Dept: NUCLEAR MEDICINE | Age: 66
Discharge: HOME | End: 2021-12-30
Payer: COMMERCIAL

## 2021-12-30 VITALS — WEIGHT: 184 LBS | BODY MASS INDEX: 34.77 KG/M2

## 2021-12-30 DIAGNOSIS — R10.13 EPIGASTRIC PAIN: ICD-10-CM

## 2021-12-30 PROCEDURE — 78227 HEPATOBIL SYST IMAGE W/DRUG: CPT

## 2021-12-30 PROCEDURE — 3430000000 HC RX DIAGNOSTIC RADIOPHARMACEUTICAL: Performed by: RADIOLOGY

## 2021-12-30 PROCEDURE — 2580000003 HC RX 258: Performed by: RADIOLOGY

## 2021-12-30 PROCEDURE — A9537 TC99M MEBROFENIN: HCPCS | Performed by: RADIOLOGY

## 2021-12-30 PROCEDURE — 6360000002 HC RX W HCPCS: Performed by: RADIOLOGY

## 2021-12-30 RX ADMIN — SODIUM CHLORIDE 1.67 MCG: 9 INJECTION, SOLUTION INTRAVENOUS at 09:58

## 2021-12-30 RX ADMIN — Medication 6 MILLICURIE: at 08:31

## 2022-01-04 ENCOUNTER — OFFICE VISIT (OUTPATIENT)
Dept: SURGERY | Age: 67
End: 2022-01-04
Payer: COMMERCIAL

## 2022-01-04 VITALS
OXYGEN SATURATION: 97 % | SYSTOLIC BLOOD PRESSURE: 166 MMHG | HEIGHT: 61 IN | DIASTOLIC BLOOD PRESSURE: 84 MMHG | BODY MASS INDEX: 34.74 KG/M2 | TEMPERATURE: 97.2 F | WEIGHT: 184 LBS | HEART RATE: 60 BPM | RESPIRATION RATE: 18 BRPM

## 2022-01-04 DIAGNOSIS — K82.8 BILIARY DYSKINESIA: Primary | ICD-10-CM

## 2022-01-04 PROCEDURE — 99212 OFFICE O/P EST SF 10 MIN: CPT | Performed by: SURGERY

## 2022-01-04 PROCEDURE — 1036F TOBACCO NON-USER: CPT | Performed by: SURGERY

## 2022-01-04 PROCEDURE — 4040F PNEUMOC VAC/ADMIN/RCVD: CPT | Performed by: SURGERY

## 2022-01-04 PROCEDURE — 1090F PRES/ABSN URINE INCON ASSESS: CPT | Performed by: SURGERY

## 2022-01-04 PROCEDURE — 3017F COLORECTAL CA SCREEN DOC REV: CPT | Performed by: SURGERY

## 2022-01-04 PROCEDURE — G8400 PT W/DXA NO RESULTS DOC: HCPCS | Performed by: SURGERY

## 2022-01-04 PROCEDURE — 1123F ACP DISCUSS/DSCN MKR DOCD: CPT | Performed by: SURGERY

## 2022-01-04 PROCEDURE — G8417 CALC BMI ABV UP PARAM F/U: HCPCS | Performed by: SURGERY

## 2022-01-04 PROCEDURE — G8427 DOCREV CUR MEDS BY ELIG CLIN: HCPCS | Performed by: SURGERY

## 2022-01-04 PROCEDURE — G8484 FLU IMMUNIZE NO ADMIN: HCPCS | Performed by: SURGERY

## 2022-01-04 NOTE — PROGRESS NOTES
General Surgery History and Physical  [facility]    Patient's Name/Date of Birth: Cornelio Francisco / 1955    Date: January 4, 2022     Surgeon: Jim Emery MD    PCP: Xander Barbosa DO     Chief Complaint: Right upper quadrant pain    HPI:   Cornelio Francisco is a 77 y.o. female who presents for evaluation of right upper quadrant pain. Timing is intermittent, radiation to back, alleviated by npo and started several weeks ago. Denies SOB, chest pain, fever, chills, diarrhea, constipation. MRCP showed a dilated common bile duct however no other abnormality. HIDA scan showed EF of 12%.       Past Medical History:   Diagnosis Date    Arthritis     Asthma     Diabetes mellitus ([facility]     GERD (gastroesophageal reflux disease)     Hyperlipidemia     Hypertension     Immune deficiency disorder ([facility]     MI, old 2018    Microalbuminuria due to type 2 diabetes mellitus ([facility] 03/03/2016    Mitral valve regurgitation     Vitamin D deficiency 03/03/2016       Past Surgical History:   Procedure Laterality Date    BREAST SURGERY      reduction    lumpectomy    COLONOSCOPY      FRACTURE SURGERY      right thumb and tailbone    HYSTERECTOMY      PACEMAKER PLACEMENT      UPPER GASTROINTESTINAL ENDOSCOPY N/A 7/9/2021    EGD ESOPHAGOGASTRODUODENOSCOPY ULTRASOUND performed by Jim Emery MD at [address]       Current Outpatient Medications   Medication Sig Dispense Refill    empagliflozin (JARDIANCE) 25 MG tablet       glipiZIDE (GLUCOTROL XL) 10 MG extended release tablet one po q day with food      ipratropium (ATROVENT) 0.06 % nasal spray use 1 to 2 sprays in each nostril every 8 hours as needed for sinus congestion and drainage      metoprolol succinate (TOPROL XL) 50 MG extended release tablet TAKE 1 & 1/2 TABLETS BY MOUTH TWICE DAILY      Semaglutide (RYBELSUS) 3 MG TABS TAKE ONE TABLET BY MOUTH EVERY MORNING 30 minutes prior to first meal (start 7 mg when done with 3 mg) Social History Narrative    Not on file     Social Determinants of Health     Financial Resource Strain:     Difficulty of Paying Living Expenses: Not on file   Food Insecurity:     Worried About Running Out of Food in the Last Year: Not on file    Kate of Food in the Last Year: Not on file   Transportation Needs:     Lack of Transportation (Medical): Not on file    Lack of Transportation (Non-Medical): Not on file   Physical Activity:     Days of Exercise per Week: Not on file    Minutes of Exercise per Session: Not on file   Stress:     Feeling of Stress : Not on file   Social Connections:     Frequency of Communication with Friends and Family: Not on file    Frequency of Social Gatherings with Friends and Family: Not on file    Attends Church Services: Not on file    Active Member of [address] [place] [address] or Organizations: Not on file    Attends Club or Organization Meetings: Not on file    Marital Status: Not on file   Intimate Partner Violence:     Fear of Current or Ex-Partner: Not on file    Emotionally Abused: Not on file    Physically Abused: Not on file    Sexually Abused: Not on file   Housing Stability:     Unable to Pay for Housing in the Last Year: Not on file    Number of [place] in the Last Year: Not on file    Unstable Housing in the Last Year: Not on file         Review of Systems    A complete 10 system review was performed and are otherwise negative unless mentioned in the above HPI. Specific negatives are listed below but may not include all those reviewed.     General ROS: negative obtundation, AMS  ENT ROS: negative rhinorrhea, epistaxis  Allergy and Immunology ROS: negative itchy/watery eyes or nasal congestion  Hematological and Lymphatic ROS: negative spontaneous bleeding or bruising  Endocrine ROS: negative  lethargy, mood swings, palpitations or polydipsia/polyuria  Respiratory ROS: negative sputum changes, stridor, tachypnea or wheezing  Cardiovascular ROS: negative for - loss of consciousness, murmur or orthopnea  Gastrointestinal ROS: negative for - hematochezia or hematemesis  Genito-Urinary ROS: negative for -  genital discharge or hematuria  Musculoskeletal ROS: negative for - focal weakness, gangrene  Psych/Neuro ROS: negative for - visual or auditory hallucinations, suicidal ideation    Physical exam:   BP (!) 166/84 (Site: Left Upper Arm, Position: Sitting, Cuff Size: Medium Adult)   Pulse 60   Temp 97.2 °F (36.2 °C) (Temporal)   Resp 18   Ht 5' 1\" (1.549 m)   Wt 184 lb (83.5 kg)   SpO2 97%   BMI 34.77 kg/m²   General appearance:  NAD, appears stated age  Head: NCAT, PERRLA, EOMI, red conjunctiva  Neck: supple, no masses, trachea midline  Lungs: Equal chest rise bilateral, no retractions, no wheezing  Heart: Reg rate  Abdomen: soft, nontender, nondistended  Skin; warm and dry, no cyanosis  Gu: no cva tenderness  Extremities: atraumatic, no focal motor deficits, no open wounds  Psych: No tremor, visual hallucinations        Radiology: I reviewed relevant abdominal imaging from this admission and that available in the EMR including HIDA scan from 12/30/2021. My assessment is biliary dyskinesia      Assessment:  77 y.o. female with biliary dyskinesia    Plan: To OR for laparoscopic cholecystectomy with intraoperative cholangiogram  Discussed risks of injury to liver, common bile duct, hepatic duct, surrounding vascular structures, small bowel, stomach. Risk for further surgery to correct complications.   Plan for laparoscopic, possible open cholecystectomy with possible intraoperative cholangiogram. Patient agrees and all questions answered to their and family's satisfaction      Jia Donohue MD  12:24 PM  1/4/2022

## 2022-02-22 ENCOUNTER — TELEPHONE (OUTPATIENT)
Dept: SURGERY | Age: 67
End: 2022-02-22

## 2022-02-22 NOTE — TELEPHONE ENCOUNTER
Per the order of Dr. Osvaldo Martinez, patient has been scheduled for laparoscopic cholecystectomy with cholangiogram on 4.14.2022. Patient provided with procedure information over the phone and informed that written information will also be mailed to her. .  Patient instructed to please contact our office with any questions. Procedure scheduled through iQueue. Dr. Osvaldo Martinez to enter orders.     Electronically signed by Doris Guo on 2/22/22 at 3:58 PM EST

## 2022-02-22 NOTE — TELEPHONE ENCOUNTER
Prior Authorization Form:      DEMOGRAPHICS:                     Patient Name:  Sindi Lloyd  Patient :  1955            Insurance:  Payor: Siri Hendrickson / [facility] / Product Type: *No Product type* /   Insurance ID Number:    Payor/Plan Subscr  Sex Relation Sub.  Ins. ID Effective Group Num   1. Prosper HOROWITZ 1955 Female Self [id number]                                   [id number]         DIAGNOSIS & PROCEDURE:                       Procedure/Operation: Laparoscopic cholecystectomy with cholangiogram           CPT Code: 58888    Diagnosis:  Biliary Dyskinesia    ICD10 Code: K82.8    Location:  [address] [place] [address]    Surgeon:  Claudia Hardy INFORMATION:                          Date: 2022    Time: TBD              Anesthesia:  General                                                       Status:  Outpatient        Special Comments:         Electronically signed by Ike Lopez on 2022 at 3:58 PM

## 2022-03-21 ENCOUNTER — TELEPHONE (OUTPATIENT)
Dept: SURGERY | Age: 67
End: 2022-03-21

## 2022-03-21 NOTE — TELEPHONE ENCOUNTER
Pt called in regards to FMLA paperwork dropped off last week. Advised  was QUYEN and will sign paperwork at earliest convenience. Pt verbalized understanding.

## 2022-04-11 RX ORDER — SODIUM CHLORIDE 9 MG/ML
INJECTION, SOLUTION INTRAVENOUS CONTINUOUS
Status: CANCELLED | OUTPATIENT
Start: 2022-04-14

## 2022-04-13 ENCOUNTER — HOSPITAL ENCOUNTER (OUTPATIENT)
Dept: PREADMISSION TESTING | Age: 67
Discharge: HOME | End: 2022-04-13
Payer: COMMERCIAL

## 2022-04-13 ENCOUNTER — ANESTHESIA EVENT (OUTPATIENT)
Dept: OPERATING ROOM | Age: 67
End: 2022-04-13
Payer: COMMERCIAL

## 2022-04-13 VITALS
BODY MASS INDEX: 34.93 KG/M2 | OXYGEN SATURATION: 97 % | HEIGHT: 61 IN | RESPIRATION RATE: 18 BRPM | SYSTOLIC BLOOD PRESSURE: 150 MMHG | WEIGHT: 185 LBS | TEMPERATURE: 97 F | HEART RATE: 86 BPM | DIASTOLIC BLOOD PRESSURE: 80 MMHG

## 2022-04-13 DIAGNOSIS — K82.8 BILIARY DYSKINESIA: Primary | ICD-10-CM

## 2022-04-13 LAB
ANION GAP SERPL CALCULATED.3IONS-SCNC: 9 MMOL/L (ref 7–16)
BUN BLDV-MCNC: 46 MG/DL (ref 6–23)
CALCIUM SERPL-MCNC: 9.3 MG/DL (ref 8.6–10.2)
CHLORIDE BLD-SCNC: 107 MMOL/L (ref 98–107)
CO2: 23 MMOL/L (ref 22–29)
CREAT SERPL-MCNC: 2.2 MG/DL (ref 0.5–1)
EKG ATRIAL RATE: 77 BPM
EKG P AXIS: 50 DEGREES
EKG P-R INTERVAL: 136 MS
EKG Q-T INTERVAL: 412 MS
EKG QRS DURATION: 106 MS
EKG QTC CALCULATION (BAZETT): 466 MS
EKG R AXIS: -53 DEGREES
EKG T AXIS: 71 DEGREES
EKG VENTRICULAR RATE: 77 BPM
GFR AFRICAN AMERICAN: 27
GFR NON-AFRICAN AMERICAN: 27 ML/MIN/1.73
GLUCOSE BLD-MCNC: 203 MG/DL (ref 74–99)
HCT VFR BLD CALC: 37.4 % (ref 34–48)
HEMOGLOBIN: 11.4 G/DL (ref 11.5–15.5)
MCH RBC QN AUTO: 26.8 PG (ref 26–35)
MCHC RBC AUTO-ENTMCNC: 30.5 % (ref 32–34.5)
MCV RBC AUTO: 87.8 FL (ref 80–99.9)
PDW BLD-RTO: 13.7 FL (ref 11.5–15)
PLATELET # BLD: 211 E9/L (ref 130–450)
PMV BLD AUTO: 11.2 FL (ref 7–12)
POTASSIUM REFLEX MAGNESIUM: 5 MMOL/L (ref 3.5–5)
RBC # BLD: 4.26 E12/L (ref 3.5–5.5)
SODIUM BLD-SCNC: 139 MMOL/L (ref 132–146)
WBC # BLD: 4.7 E9/L (ref 4.5–11.5)

## 2022-04-13 PROCEDURE — 36415 COLL VENOUS BLD VENIPUNCTURE: CPT

## 2022-04-13 PROCEDURE — 80048 BASIC METABOLIC PNL TOTAL CA: CPT

## 2022-04-13 PROCEDURE — 93005 ELECTROCARDIOGRAM TRACING: CPT | Performed by: ANESTHESIOLOGY

## 2022-04-13 PROCEDURE — 85027 COMPLETE CBC AUTOMATED: CPT

## 2022-04-13 NOTE — H&P
General Surgery History and Physical  [facility]    Patient's Name/Date of Birth: Lavell Mcdaniel / 1955    Date: April 13, 2022     Surgeon: Rachel Canela MD    PCP: Miguel Lindsey DO     Chief Complaint: Right upper quadrant pain    HPI:   Lavell Mcdaniel is a 79 y.o. female who presents for evaluation of right upper quadrant pain. Timing is intermittent, radiation to back, alleviated by npo and started several weeks ago. Denies SOB, chest pain, fever, chills, diarrhea, constipation. MRCP showed a dilated common bile duct however no other abnormality. HIDA scan showed EF of 12%. Past Medical History:   Diagnosis Date    Arthritis     Asthma     Diabetes mellitus ([facility] [address]     GERD (gastroesophageal reflux disease)     Hyperlipidemia     Hypertension     Immune deficiency disorder ([facility] [facility]     MI, old 2018    Microalbuminuria due to type 2 diabetes mellitus ([facility] [address] [facility] 03/03/2016    Mitral valve regurgitation     Vitamin D deficiency 03/03/2016       Past Surgical History:   Procedure Laterality Date    BREAST SURGERY      reduction    lumpectomy    COLONOSCOPY      FRACTURE SURGERY      right thumb and tailbone    HYSTERECTOMY      PACEMAKER PLACEMENT  [id number]    defibr[facility]ator at [facility] N/A 7/9/2021    EGD ESOPHAGOGASTRODUODENOSCOPY ULTRASOUND performed by Rachel Canela MD at [address]       No current facility-administered medications for this encounter.      Current Outpatient Medications   Medication Sig Dispense Refill    ipratropium (ATROVENT) 0.06 % nasal spray use 1 to 2 sprays in each nostril every 8 hours as needed for sinus congestion and drainage      metoprolol succinate (TOPROL XL) 50 MG extended release tablet TAKE 1 & 1/2 TABLETS BY MOUTH TWICE DAILY      NONFORMULARY Supplement for hot flashes   cream      aspirin 81 MG EC tablet Take 81 mg by mouth daily      Sacubitril-Valsartan (ENTRESTO PO) Take by mouth 2 times daily      bumetanide (BUMEX) 1 MG tablet Take 1 mg by mouth as needed      spironolactone (ALDACTONE) 25 MG tablet Take 25 mg by mouth daily      metFORMIN ER (GLUCOPHAGE-XR) 500 MG XR tablet Take 1 tablet by mouth 2 times daily       simvastatin (ZOCOR) 40 MG tablet Take 1 tablet by mouth nightly (Patient not taking: Reported on 11/9/2021) 30 tablet 3       Allergies   Allergen Reactions    Latex      Other reaction(s): Other: See Comments  Some issues w latex condoms, although no issues with other sources of latex exposure.  Penicillins Swelling     Mouth, hands and fingers       The patient has a family history that is negative for severe cardiovascular or respiratory issues, negative for reaction to anesthesia. Social History     Socioeconomic History    Marital status: Single     Spouse name: Not on file    Number of children: Not on file    Years of education: Not on file    Highest education level: Not on file   Occupational History    Occupation: Supervisor   Tobacco Use    Smoking status: Never Smoker    Smokeless tobacco: Never Used   Substance and Sexual Activity    Alcohol use: Yes     Alcohol/week: 1.0 standard drink     Types: 1 Glasses of wine per week     Comment: social    Drug use: No    Sexual activity: Yes     Partners: Male   Other Topics Concern    Not on file   Social History Narrative    Not on file     Social Determinants of Health     Financial Resource Strain:     Difficulty of Paying Living Expenses: Not on file   Food Insecurity:     Worried About Running Out of Food in the Last Year: Not on file    Kate of Food in the Last Year: Not on file   Transportation Needs:     Lack of Transportation (Medical): Not on file    Lack of Transportation (Non-Medical):  Not on file   Physical Activity:     Days of Exercise per Week: Not on file    Minutes of Exercise per Session: Not on file   Stress:     Feeling of Stress : Not on file   Social Connections:     Frequency of Communication with Friends and Family: Not on file    Frequency of Social Gatherings with Friends and Family: Not on file    Attends Restoration Services: Not on file    Active Member of Clubs or Organizations: Not on file    Attends Club or Organization Meetings: Not on file    Marital Status: Not on file   Intimate Partner Violence:     Fear of Current or Ex-Partner: Not on file    Emotionally Abused: Not on file    Physically Abused: Not on file    Sexually Abused: Not on file   Housing Stability:     Unable to Pay for Housing in the Last Year: Not on file    Number of [place] in the Last Year: Not on file    Unstable Housing in the Last Year: Not on file         Review of Systems    A complete 10 system review was performed and are otherwise negative unless mentioned in the above HPI. Specific negatives are listed below but may not include all those reviewed. General ROS: negative obtundation, AMS  ENT ROS: negative rhinorrhea, epistaxis  Allergy and Immunology ROS: negative itchy/watery eyes or nasal congestion  Hematological and Lymphatic ROS: negative spontaneous bleeding or bruising  Endocrine ROS: negative  lethargy, mood swings, palpitations or polydipsia/polyuria  Respiratory ROS: negative sputum changes, stridor, tachypnea or wheezing  Cardiovascular ROS: negative for - loss of consciousness, murmur or orthopnea  Gastrointestinal ROS: negative for - hematochezia or hematemesis  Genito-Urinary ROS: negative for -  genital discharge or hematuria  Musculoskeletal ROS: negative for - focal weakness, gangrene  Psych/Neuro ROS: negative for - visual or auditory hallucinations, suicidal ideation    Physical exam:   There were no vitals taken for this visit.   General appearance:  NAD, appears stated age  Head: NCAT, PERRLA, EOMI, red conjunctiva  Neck: supple, no masses, trachea midline  Lungs: Equal chest rise bilateral, no retractions, no wheezing  Heart: Reg rate  Abdomen: soft, nontender, nondistended  Skin; warm and dry, no cyanosis  Gu: no cva tenderness  Extremities: atraumatic, no focal motor deficits, no open wounds  Psych: No tremor, visual hallucinations        Radiology: I reviewed relevant abdominal imaging from this admission and that available in the EMR including HIDA scan from 12/30/2021. My assessment is biliary dyskinesia      Assessment:  79 y.o. female with biliary dyskinesia    Plan: To OR for laparoscopic cholecystectomy with intraoperative cholangiogram  Discussed risks of injury to liver, common bile duct, hepatic duct, surrounding vascular structures, small bowel, stomach. Risk for further surgery to correct complications.   Plan for laparoscopic, possible open cholecystectomy with possible intraoperative cholangiogram. Patient agrees and all questions answered to their and family's satisfaction      Gus Hawkins MD  3:49 PM  4/13/2022

## 2022-04-13 NOTE — PROGRESS NOTES
[facility]                                                                                                                    PRE OP INSTRUCTIONS FOR  Leann Urbina        Date: 4/13/2022    Date of surgery: 4/14/22   Arrival Time: Hospital will call you between 5pm and 7pm with your final arrival time for surgery    1. Do not eat or drink anything after midnight prior to surgery. This includes no water, chewing gum, mints or ice chips. 2. Take the following medications with a small sip of water on the morning of Surgery: metoprolol     3. Diabetics may take evening dose of insulin but none after midnight. If you feel symptomatic or low blood sugar morning of surgery drink 1-2 ounces of apple juice only. 4. Aspirin, Ibuprofen, Advil, Naproxen, Vitamin E and other Anti-inflammatory products should be stopped  before surgery  as directed by your physician. Take Tylenol only unless instructed otherwise by your surgeon. 5. Check with your Doctor regarding stopping Plavix, Coumadin, Lovenox, Eliquis, Effient, or other blood thinners. 6. Do not smoke,use illicit drugs and do not drink any alcoholic beverages 24 hours prior to surgery. 7. You may brush your teeth the morning of surgery. DO NOT SWALLOW WATER    8. You MUST make arrangements for a responsible adult to take you home after your surgery. You will not be allowed to leave alone or drive yourself home. It is strongly suggested someone stay with you the first 24 hrs. Your surgery will be cancelled if you do not have a ride home. 9. Please wear simple, loose fitting clothing to the hospital.  [place] not bring valuables (money, credit cards, checkbooks, etc.) Do not wear any makeup (including no eye makeup) or nail polish on your fingers or toes. 10. DO NOT wear any jewelry or piercings on day of surgery. All body piercing jewelry must be removed. 11.  Shower the night before surgery with ___Antibacterial soap 12. If you have a Living Will and Durable Power of  for Healthcare, please bring in a copy. 13. If appropriate bring crutches, inspirex, WALKER, CANE etc...    15. Notify your Surgeon if you develop any illness between now and surgery time, cough, cold, fever, sore throat, nausea, vomiting, etc.  Please notify your surgeon if you experience dizziness, shortness of breath or blurred vision between now & the time of your surgery. 15. If you have ___dentures, they will be removed before going to the OR; we will provide you a container. If you wear ___contact lenses or ___glasses, they will be removed; please bring a case for them. 16. To provide excellent care visitors will be limited to 1 in the room at any given time. 16. During flu season no children under the age of 15 are permitted in the hospital for the safety of all patients. 18. Other                  Please call AMBULATORY CARE if you have any further questions.    [facility]     [address]

## 2022-04-14 ENCOUNTER — HOSPITAL ENCOUNTER (OUTPATIENT)
Age: 67
Setting detail: OUTPATIENT SURGERY
Discharge: HOME | End: 2022-04-14
Attending: SURGERY | Admitting: SURGERY
Payer: COMMERCIAL

## 2022-04-14 ENCOUNTER — ANESTHESIA (OUTPATIENT)
Dept: OPERATING ROOM | Age: 67
End: 2022-04-14
Payer: COMMERCIAL

## 2022-04-14 ENCOUNTER — APPOINTMENT (OUTPATIENT)
Dept: GENERAL RADIOLOGY | Age: 67
End: 2022-04-14
Attending: SURGERY
Payer: COMMERCIAL

## 2022-04-14 VITALS
HEIGHT: 61 IN | HEART RATE: 76 BPM | DIASTOLIC BLOOD PRESSURE: 70 MMHG | RESPIRATION RATE: 20 BRPM | TEMPERATURE: 96.8 F | WEIGHT: 182 LBS | SYSTOLIC BLOOD PRESSURE: 148 MMHG | OXYGEN SATURATION: 97 % | BODY MASS INDEX: 34.36 KG/M2

## 2022-04-14 VITALS
SYSTOLIC BLOOD PRESSURE: 168 MMHG | DIASTOLIC BLOOD PRESSURE: 97 MMHG | RESPIRATION RATE: 30 BRPM | OXYGEN SATURATION: 100 %

## 2022-04-14 DIAGNOSIS — K82.9 GALL BLADDER DISEASE: ICD-10-CM

## 2022-04-14 LAB — METER GLUCOSE: 171 MG/DL (ref 74–99)

## 2022-04-14 PROCEDURE — 82962 GLUCOSE BLOOD TEST: CPT

## 2022-04-14 PROCEDURE — 2500000003 HC RX 250 WO HCPCS: Performed by: SURGERY

## 2022-04-14 PROCEDURE — 3600000014 HC SURGERY LEVEL 4 ADDTL 15MIN: Performed by: SURGERY

## 2022-04-14 PROCEDURE — 2709999900 HC NON-CHARGEABLE SUPPLY: Performed by: SURGERY

## 2022-04-14 PROCEDURE — 2500000003 HC RX 250 WO HCPCS: Performed by: NURSE ANESTHETIST, CERTIFIED REGISTERED

## 2022-04-14 PROCEDURE — 7100000010 HC PHASE II RECOVERY - FIRST 15 MIN: Performed by: SURGERY

## 2022-04-14 PROCEDURE — 3700000001 HC ADD 15 MINUTES (ANESTHESIA): Performed by: SURGERY

## 2022-04-14 PROCEDURE — 3700000000 HC ANESTHESIA ATTENDED CARE: Performed by: SURGERY

## 2022-04-14 PROCEDURE — 7100000000 HC PACU RECOVERY - FIRST 15 MIN: Performed by: SURGERY

## 2022-04-14 PROCEDURE — 7100000011 HC PHASE II RECOVERY - ADDTL 15 MIN: Performed by: SURGERY

## 2022-04-14 PROCEDURE — 88304 TISSUE EXAM BY PATHOLOGIST: CPT

## 2022-04-14 PROCEDURE — 6370000000 HC RX 637 (ALT 250 FOR IP): Performed by: ANESTHESIOLOGY

## 2022-04-14 PROCEDURE — C1894 INTRO/SHEATH, NON-LASER: HCPCS | Performed by: SURGERY

## 2022-04-14 PROCEDURE — 7100000001 HC PACU RECOVERY - ADDTL 15 MIN: Performed by: SURGERY

## 2022-04-14 PROCEDURE — 2580000003 HC RX 258: Performed by: ANESTHESIOLOGY

## 2022-04-14 PROCEDURE — 47562 LAPAROSCOPIC CHOLECYSTECTOMY: CPT | Performed by: SURGERY

## 2022-04-14 PROCEDURE — 2500000003 HC RX 250 WO HCPCS: Performed by: ANESTHESIOLOGY

## 2022-04-14 PROCEDURE — 6360000002 HC RX W HCPCS: Performed by: NURSE ANESTHETIST, CERTIFIED REGISTERED

## 2022-04-14 PROCEDURE — 6360000002 HC RX W HCPCS: Performed by: SURGERY

## 2022-04-14 PROCEDURE — 6360000002 HC RX W HCPCS

## 2022-04-14 PROCEDURE — 2580000003 HC RX 258: Performed by: NURSE ANESTHETIST, CERTIFIED REGISTERED

## 2022-04-14 PROCEDURE — 3600000004 HC SURGERY LEVEL 4 BASE: Performed by: SURGERY

## 2022-04-14 RX ORDER — ONDANSETRON 2 MG/ML
4 INJECTION INTRAMUSCULAR; INTRAVENOUS
Status: DISCONTINUED | OUTPATIENT
Start: 2022-04-14 | End: 2022-04-14 | Stop reason: HOSPADM

## 2022-04-14 RX ORDER — MIDAZOLAM HYDROCHLORIDE 1 MG/ML
INJECTION INTRAMUSCULAR; INTRAVENOUS PRN
Status: DISCONTINUED | OUTPATIENT
Start: 2022-04-14 | End: 2022-04-14 | Stop reason: SDUPTHER

## 2022-04-14 RX ORDER — GLYCOPYRROLATE 1 MG/5 ML
SYRINGE (ML) INTRAVENOUS PRN
Status: DISCONTINUED | OUTPATIENT
Start: 2022-04-14 | End: 2022-04-14 | Stop reason: SDUPTHER

## 2022-04-14 RX ORDER — SODIUM CHLORIDE 9 MG/ML
INJECTION, SOLUTION INTRAVENOUS CONTINUOUS
Status: DISCONTINUED | OUTPATIENT
Start: 2022-04-14 | End: 2022-04-14 | Stop reason: HOSPADM

## 2022-04-14 RX ORDER — NEOSTIGMINE METHYLSULFATE 1 MG/ML
INJECTION, SOLUTION INTRAVENOUS PRN
Status: DISCONTINUED | OUTPATIENT
Start: 2022-04-14 | End: 2022-04-14 | Stop reason: SDUPTHER

## 2022-04-14 RX ORDER — HYDROCODONE BITARTRATE AND ACETAMINOPHEN 5; 325 MG/1; MG/1
1 TABLET ORAL EVERY 6 HOURS PRN
Qty: 10 TABLET | Refills: 0 | Status: SHIPPED | OUTPATIENT
Start: 2022-04-14 | End: 2022-04-17

## 2022-04-14 RX ORDER — ONDANSETRON 2 MG/ML
INJECTION INTRAMUSCULAR; INTRAVENOUS PRN
Status: DISCONTINUED | OUTPATIENT
Start: 2022-04-14 | End: 2022-04-14 | Stop reason: SDUPTHER

## 2022-04-14 RX ORDER — HYDRALAZINE HYDROCHLORIDE 20 MG/ML
10 INJECTION INTRAMUSCULAR; INTRAVENOUS
Status: COMPLETED | OUTPATIENT
Start: 2022-04-14 | End: 2022-04-14

## 2022-04-14 RX ORDER — PROPOFOL 10 MG/ML
INJECTION, EMULSION INTRAVENOUS PRN
Status: DISCONTINUED | OUTPATIENT
Start: 2022-04-14 | End: 2022-04-14 | Stop reason: SDUPTHER

## 2022-04-14 RX ORDER — DIPHENHYDRAMINE HYDROCHLORIDE 50 MG/ML
12.5 INJECTION INTRAMUSCULAR; INTRAVENOUS
Status: DISCONTINUED | OUTPATIENT
Start: 2022-04-14 | End: 2022-04-14 | Stop reason: HOSPADM

## 2022-04-14 RX ORDER — LIDOCAINE HYDROCHLORIDE 20 MG/ML
INJECTION, SOLUTION INTRAVENOUS PRN
Status: DISCONTINUED | OUTPATIENT
Start: 2022-04-14 | End: 2022-04-14 | Stop reason: SDUPTHER

## 2022-04-14 RX ORDER — ONDANSETRON 4 MG/1
4 TABLET, FILM COATED ORAL 3 TIMES DAILY PRN
Qty: 15 TABLET | Refills: 0 | Status: SHIPPED | OUTPATIENT
Start: 2022-04-14

## 2022-04-14 RX ORDER — BUPIVACAINE HYDROCHLORIDE AND EPINEPHRINE 2.5; 5 MG/ML; UG/ML
INJECTION, SOLUTION EPIDURAL; INFILTRATION; INTRACAUDAL; PERINEURAL PRN
Status: DISCONTINUED | OUTPATIENT
Start: 2022-04-14 | End: 2022-04-14 | Stop reason: ALTCHOICE

## 2022-04-14 RX ORDER — HYDROCODONE BITARTRATE AND ACETAMINOPHEN 5; 325 MG/1; MG/1
1 TABLET ORAL ONCE
Status: COMPLETED | OUTPATIENT
Start: 2022-04-14 | End: 2022-04-14

## 2022-04-14 RX ORDER — MEPERIDINE HYDROCHLORIDE 25 MG/ML
INJECTION INTRAMUSCULAR; INTRAVENOUS; SUBCUTANEOUS
Status: COMPLETED
Start: 2022-04-14 | End: 2022-04-14

## 2022-04-14 RX ORDER — SODIUM CHLORIDE 9 MG/ML
INJECTION, SOLUTION INTRAVENOUS CONTINUOUS PRN
Status: DISCONTINUED | OUTPATIENT
Start: 2022-04-14 | End: 2022-04-14 | Stop reason: SDUPTHER

## 2022-04-14 RX ORDER — SODIUM CHLORIDE 9 MG/ML
25 INJECTION, SOLUTION INTRAVENOUS PRN
Status: DISCONTINUED | OUTPATIENT
Start: 2022-04-14 | End: 2022-04-14 | Stop reason: HOSPADM

## 2022-04-14 RX ORDER — MEPERIDINE HYDROCHLORIDE 25 MG/ML
12.5 INJECTION INTRAMUSCULAR; INTRAVENOUS; SUBCUTANEOUS EVERY 5 MIN PRN
Status: DISCONTINUED | OUTPATIENT
Start: 2022-04-14 | End: 2022-04-14 | Stop reason: HOSPADM

## 2022-04-14 RX ORDER — DEXAMETHASONE SODIUM PHOSPHATE 10 MG/ML
INJECTION, SOLUTION INTRAMUSCULAR; INTRAVENOUS PRN
Status: DISCONTINUED | OUTPATIENT
Start: 2022-04-14 | End: 2022-04-14 | Stop reason: SDUPTHER

## 2022-04-14 RX ORDER — HYDRALAZINE HYDROCHLORIDE 20 MG/ML
INJECTION INTRAMUSCULAR; INTRAVENOUS
Status: COMPLETED
Start: 2022-04-14 | End: 2022-04-14

## 2022-04-14 RX ORDER — SODIUM CHLORIDE 0.9 % (FLUSH) 0.9 %
5-40 SYRINGE (ML) INJECTION EVERY 12 HOURS SCHEDULED
Status: DISCONTINUED | OUTPATIENT
Start: 2022-04-14 | End: 2022-04-14 | Stop reason: HOSPADM

## 2022-04-14 RX ORDER — FENTANYL CITRATE 50 UG/ML
INJECTION, SOLUTION INTRAMUSCULAR; INTRAVENOUS PRN
Status: DISCONTINUED | OUTPATIENT
Start: 2022-04-14 | End: 2022-04-14 | Stop reason: SDUPTHER

## 2022-04-14 RX ORDER — SODIUM CHLORIDE 0.9 % (FLUSH) 0.9 %
5-40 SYRINGE (ML) INJECTION PRN
Status: DISCONTINUED | OUTPATIENT
Start: 2022-04-14 | End: 2022-04-14 | Stop reason: HOSPADM

## 2022-04-14 RX ORDER — MORPHINE SULFATE 2 MG/ML
2 INJECTION, SOLUTION INTRAMUSCULAR; INTRAVENOUS EVERY 5 MIN PRN
Status: DISCONTINUED | OUTPATIENT
Start: 2022-04-14 | End: 2022-04-14 | Stop reason: HOSPADM

## 2022-04-14 RX ORDER — CIPROFLOXACIN 2 MG/ML
400 INJECTION, SOLUTION INTRAVENOUS
Status: COMPLETED | OUTPATIENT
Start: 2022-04-14 | End: 2022-04-14

## 2022-04-14 RX ORDER — ROCURONIUM BROMIDE 10 MG/ML
INJECTION, SOLUTION INTRAVENOUS PRN
Status: DISCONTINUED | OUTPATIENT
Start: 2022-04-14 | End: 2022-04-14 | Stop reason: SDUPTHER

## 2022-04-14 RX ORDER — LABETALOL HYDROCHLORIDE 5 MG/ML
10 INJECTION, SOLUTION INTRAVENOUS
Status: COMPLETED | OUTPATIENT
Start: 2022-04-14 | End: 2022-04-14

## 2022-04-14 RX ADMIN — ONDANSETRON 4 MG: 2 INJECTION INTRAMUSCULAR; INTRAVENOUS at 08:38

## 2022-04-14 RX ADMIN — MEPERIDINE HYDROCHLORIDE 12.5 MG: 25 INJECTION INTRAMUSCULAR; INTRAVENOUS; SUBCUTANEOUS at 09:58

## 2022-04-14 RX ADMIN — ROCURONIUM BROMIDE 40 MG: 10 SOLUTION INTRAVENOUS at 08:10

## 2022-04-14 RX ADMIN — PROPOFOL 150 MG: 10 INJECTION, EMULSION INTRAVENOUS at 08:09

## 2022-04-14 RX ADMIN — LABETALOL HYDROCHLORIDE 10 MG: 5 INJECTION INTRAVENOUS at 09:02

## 2022-04-14 RX ADMIN — FENTANYL CITRATE 50 MCG: 50 INJECTION, SOLUTION INTRAMUSCULAR; INTRAVENOUS at 08:55

## 2022-04-14 RX ADMIN — HYDRALAZINE HYDROCHLORIDE 10 MG: 20 INJECTION INTRAMUSCULAR; INTRAVENOUS at 09:37

## 2022-04-14 RX ADMIN — PROPOFOL 50 MG: 10 INJECTION, EMULSION INTRAVENOUS at 08:39

## 2022-04-14 RX ADMIN — LIDOCAINE HYDROCHLORIDE 60 MG: 20 INJECTION, SOLUTION INTRAVENOUS at 08:08

## 2022-04-14 RX ADMIN — FENTANYL CITRATE 100 MCG: 50 INJECTION, SOLUTION INTRAMUSCULAR; INTRAVENOUS at 08:08

## 2022-04-14 RX ADMIN — PHENYLEPHRINE HYDROCHLORIDE 100 MCG: 10 INJECTION INTRAVENOUS at 08:23

## 2022-04-14 RX ADMIN — SODIUM CHLORIDE: 9 INJECTION, SOLUTION INTRAVENOUS at 08:00

## 2022-04-14 RX ADMIN — CIPROFLOXACIN 400 MG: 2 INJECTION INTRAVENOUS at 08:00

## 2022-04-14 RX ADMIN — SODIUM CHLORIDE: 9 INJECTION, SOLUTION INTRAVENOUS at 07:16

## 2022-04-14 RX ADMIN — FENTANYL CITRATE 100 MCG: 50 INJECTION, SOLUTION INTRAMUSCULAR; INTRAVENOUS at 08:30

## 2022-04-14 RX ADMIN — HYDROCODONE BITARTRATE AND ACETAMINOPHEN 1 TABLET: 5; 325 TABLET ORAL at 12:44

## 2022-04-14 RX ADMIN — DEXAMETHASONE SODIUM PHOSPHATE 10 MG: 10 INJECTION, SOLUTION INTRAMUSCULAR; INTRAVENOUS at 08:12

## 2022-04-14 RX ADMIN — Medication 3 MG: at 08:38

## 2022-04-14 RX ADMIN — Medication 0.6 MG: at 08:38

## 2022-04-14 RX ADMIN — MIDAZOLAM 2 MG: 1 INJECTION INTRAMUSCULAR; INTRAVENOUS at 08:00

## 2022-04-14 RX ADMIN — MEPERIDINE HYDROCHLORIDE 12.5 MG: 25 INJECTION, SOLUTION INTRAMUSCULAR; INTRAVENOUS; SUBCUTANEOUS at 09:58

## 2022-04-14 ASSESSMENT — PAIN DESCRIPTION - DESCRIPTORS
DESCRIPTORS: ACHING

## 2022-04-14 ASSESSMENT — PULMONARY FUNCTION TESTS
PIF_VALUE: 25
PIF_VALUE: 25
PIF_VALUE: 24
PIF_VALUE: 18
PIF_VALUE: 23
PIF_VALUE: 31
PIF_VALUE: 20
PIF_VALUE: 2
PIF_VALUE: 2
PIF_VALUE: 25
PIF_VALUE: 25
PIF_VALUE: 1
PIF_VALUE: 19
PIF_VALUE: 20
PIF_VALUE: 3
PIF_VALUE: 24
PIF_VALUE: 2
PIF_VALUE: 10
PIF_VALUE: 18
PIF_VALUE: 20
PIF_VALUE: 21
PIF_VALUE: 2
PIF_VALUE: 24
PIF_VALUE: 18
PIF_VALUE: 2
PIF_VALUE: 24
PIF_VALUE: 19
PIF_VALUE: 17
PIF_VALUE: 25
PIF_VALUE: 0
PIF_VALUE: 23
PIF_VALUE: 25
PIF_VALUE: 3
PIF_VALUE: 2
PIF_VALUE: 24
PIF_VALUE: 16
PIF_VALUE: 22
PIF_VALUE: 17
PIF_VALUE: 2
PIF_VALUE: 18
PIF_VALUE: 25
PIF_VALUE: 20
PIF_VALUE: 24
PIF_VALUE: 1
PIF_VALUE: 25
PIF_VALUE: 20
PIF_VALUE: 24

## 2022-04-14 ASSESSMENT — PAIN DESCRIPTION - PAIN TYPE
TYPE: SURGICAL PAIN

## 2022-04-14 ASSESSMENT — PAIN SCALES - GENERAL
PAINLEVEL_OUTOF10: 5
PAINLEVEL_OUTOF10: 0
PAINLEVEL_OUTOF10: 1
PAINLEVEL_OUTOF10: 4
PAINLEVEL_OUTOF10: 5

## 2022-04-14 ASSESSMENT — PAIN DESCRIPTION - LOCATION
LOCATION: ABDOMEN

## 2022-04-14 ASSESSMENT — LIFESTYLE VARIABLES: SMOKING_STATUS: 0

## 2022-04-14 ASSESSMENT — PAIN - FUNCTIONAL ASSESSMENT: PAIN_FUNCTIONAL_ASSESSMENT: 0-10

## 2022-04-14 NOTE — OP NOTE
DATE OF PROCEDURE:  4/14/2022      SURGEON:  Karen Langley MD      ASSISTANT:  none      PREOPERATIVE DIAGNOSIS:  Biliary dyskinesia. POSTOPERATIVE DIAGNOSIS:  same      OPERATION:  Laparoscopic cholecystectomy. ANESTHESIA:  General.      ESTIMATED BLOOD LOSS:minimal      COMPLICATIONS:  None. FLUIDS:  Crystalloid. SPECIMEN:  Gallbladder. DISPOSITION:  To  home. INDICATION:  Yana Morales is a 79 y.o. female who presented     for evaluation of right upper quadrant abdominal pain consistent with  Biliary dyskineis. We explained the risks, benefits, potential outcomes, and   alternatives of treatment to the aforementioned procedure, including risk of   potential biliary leak or bile duct injury requiring further surgeries, infection or   bleeding requiring procedure or surgeries. She agreed to proceed   understanding those risks and potential outcomes. PROCEDURE:  The patient was brought into the operative suite and was given   preoperative antibiotics 30 minutes before incision, was placed under general   anesthesia, and then was prepped and draped in normal sterile condition. Once this was done, a 5-mm incision was made supraumbilically and a Veress   needle was passed through this incision into the peritoneum. Once this was done, CO2 was used to insufflate the abdomen to a pressure of 15 mmHg. At that time, the Veress needle was removed and replaced with a 5-mm trocar. The laparoscope was placed through that trocar and port, and once this was done, under direct visualization with   the laparoscope, an additional  10-mm port was placed in the subxiphoid area. Two right lateral abdominal ports were placed, 5 mm in size, under direct   visualization with the laparoscope. Once this was done, the gallbladder was retracted cephaladly with blunt   graspers.   Blunt dissection as well as electrocautery were able to free the   gallbladder and expose the cystic duct and artery. A clip was placed on the gallbladder side of the cystic duct. A ductotomy was made with EndoShears. I attempted to pass a  Catheter into the cystic duct stump however I was unable to complete this. Cholangiogram was aborted. I then placed clips proximally and distally on the cystic duct and artery and both structures were divided with EndoShears. Electrocautery then was able to remove the gallbladder from liver bed. Any bleeding was electrocauterized for hemostasis. The abdomen   was  then suction irrigated until the aspirate returned clear and the   gallbladder was removed previous to this with the endobag. It was sent for   specimen at that time. The 10-mm abdominal incision and defect in the fascia was closed in a   figure-of-8 fashion with 0 Vicryl suture. Once this was done, the skin was   approximated with 4-0 Monocryl suture in a subcuticular fashion. The patient   was then woken up in stable and taken to PACU.     Homar Noel MD  8:57 AM  4/14/2022

## 2022-04-14 NOTE — PROGRESS NOTES
4/14/22 1245 reviewed discharge instructions with pt and her daughter.  Both verbalized understanding,signed in agreement and given copy. kmo rn

## 2022-04-14 NOTE — PROGRESS NOTES
Anesthesia notified of BP and last dose of metoprolol. Will address in OR.   Wily Connelly RN  4/14/2022  [id number]

## 2022-04-14 NOTE — PROGRESS NOTES
4/14/22 1200 pt has been drowsy and when awake c/o of pain. Explained pt would need to eat before we can give her pain medications. Food given. Will continue to assess.  betsy sotelo

## 2022-04-14 NOTE — ANESTHESIA PRE PROCEDURE
Department of Anesthesiology  Preprocedure Note       Name:  Leann Urbina   Age:  79 y.o.  :  1955                                          MRN:  [id number]         Date:  2022      Surgeon: Prosper Weber):  Nay Bajwa MD    Procedure: Procedure(s):  LAPAROSCOPIC CHOLECYSTECTOMY WITH CHOLANGIOGRAM    Medications prior to admission:   Prior to Admission medications    Medication Sig Start Date End Date Taking? Authorizing Provider   HYDROcodone-acetaminophen (NORCO) 5-325 MG per tablet Take 1 tablet by mouth every 6 hours as needed for Pain for up to 3 days. Intended supply: 3 days.  Take lowest dose possible to manage pain 22 Yes Nay Bajwa MD   ondansetron (ZOFRAN) 4 MG tablet Take 1 tablet by mouth 3 times daily as needed for Nausea or Vomiting 22  Yes Nay Bajwa MD   ipratropium (ATROVENT) 0.06 % nasal spray use 1 to 2 sprays in each nostril every 8 hours as needed for sinus congestion and drainage 10/5/21   Historical Provider, MD   metoprolol succinate (TOPROL XL) 50 MG extended release tablet TAKE 1 & 1/2 TABLETS BY MOUTH TWICE DAILY 20   Historical Provider, MD   NONFORMULARY Supplement for hot flashes   cream    Historical Provider, MD   aspirin 81 MG EC tablet Take 81 mg by mouth daily    Historical Provider, MD   Sacubitril-Valsartan (ENTRESTO PO) Take by mouth 2 times daily    Historical Provider, MD   bumetanide (BUMEX) 1 MG tablet Take 1 mg by mouth as needed    Historical Provider, MD   spironolactone (ALDACTONE) 25 MG tablet Take 25 mg by mouth daily    Historical Provider, MD   metFORMIN ER (GLUCOPHAGE-XR) 500 MG XR tablet Take 1 tablet by mouth 2 times daily  16   Historical Provider, MD   simvastatin (ZOCOR) 40 MG tablet Take 1 tablet by mouth nightly  Patient not taking: Reported on 2021 11/6/15 11/21/16  Kumar Alvares MD       Current medications:    Current Facility-Administered Medications   Medication Dose Route Frequency Provider Last Rate Last Admin    sodium chloride flush 0.9 % injection 5-40 mL  5-40 mL IntraVENous 2 times per day Elizabeth Hope MD        sodium chloride flush 0.9 % injection 5-40 mL  5-40 mL IntraVENous PRN Elizabeth Hope MD        0.9 % sodium chloride infusion  25 mL IntraVENous PRN Elizabeth Hope MD        ciprofloxacin (CIPRO) IVPB 400 mg  400 mg IntraVENous On Call to Viki Marti MD        0.9 % sodium chloride infusion   IntraVENous Continuous Lakeshia Linares MD 50 mL/hr at 04/14/22 0716 New Bag at 04/14/22 0716       Allergies: Allergies   Allergen Reactions    Latex      Other reaction(s): Other: See Comments  Some issues w latex condoms, although no issues with other sources of latex exposure.      Penicillins Swelling     Mouth, hands and fingers       Problem List:    Patient Active Problem List   Diagnosis Code    Diabetes mellitus ([facility] E11.9    Hypertension I10    Asthma J45.909    Hyperlipidemia E78.5    Vitamin D deficiency E55.9    Microalbuminuria due to type 2 diabetes mellitus ([address] E11.29, R80.9    Enteritis K52.9    Intractable vomiting R11.10    Dilated cbd, acquired K83.8       Past Medical History:        Diagnosis Date    Arthritis     Asthma     Diabetes mellitus ([facility] [address]     GERD (gastroesophageal reflux disease)     Hyperlipidemia     Hypertension     Immune deficiency disorder ([address]     MI, old 2018    Microalbuminuria due to type 2 diabetes mellitus ([facility] [address] 03/03/2016    Mitral valve regurgitation     Vitamin D deficiency 03/03/2016       Past Surgical History:        Procedure Laterality Date    BREAST SURGERY      reduction    lumpectomy    COLONOSCOPY      FRACTURE SURGERY      right thumb and tailbone    HYSTERECTOMY      PACEMAKER PLACEMENT  [id number]    defibrillator at [facility] N/A 7/9/2021    EGD ESOPHAGOGASTRODUODENOSCOPY ULTRASOUND performed by Elizabeth Hope MD at [address] [facility] History:    Social History     Tobacco Use    Smoking status: Never Smoker    Smokeless tobacco: Never Used   Substance Use Topics    Alcohol use: Yes     Alcohol/week: 1.0 standard drink     Types: 1 Glasses of wine per week     Comment: social                                Counseling given: Not Answered      Vital Signs (Current):   Vitals:    04/14/22 0649   BP: (!) 187/103   Pulse: 79   Resp: 16   Temp: 36.3 °C (97.4 °F)   TempSrc: Infrared   SpO2: 98%   Weight: 182 lb (82.6 kg)   Height: 5' 1\" (1.549 m)                                              BP Readings from Last 3 Encounters:   04/14/22 (!) 187/103   04/13/22 (!) 150/80   01/04/22 (!) 166/84       NPO Status: Time of last liquid consumption: 0001                        Time of last solid consumption: 1930                        Date of last liquid consumption: 04/14/22                        Date of last solid food consumption: 04/13/22    BMI:   Wt Readings from Last 3 Encounters:   04/14/22 182 lb (82.6 kg)   04/13/22 185 lb (83.9 kg)   01/04/22 184 lb (83.5 kg)     Body mass index is 34.39 kg/m². CBC:   Lab Results   Component Value Date    WBC 4.7 04/13/2022    RBC 4.26 04/13/2022    HGB 11.4 04/13/2022    HCT 37.4 04/13/2022    MCV 87.8 04/13/2022    RDW 13.7 04/13/2022     04/13/2022       CMP:   Lab Results   Component Value Date     04/13/2022    K 5.0 04/13/2022     04/13/2022    CO2 23 04/13/2022    BUN 46 04/13/2022    CREATININE 2.2 04/13/2022    GFRAA 27 04/13/2022    LABGLOM 27 04/13/2022    GLUCOSE 203 04/13/2022    PROT 7.2 07/06/2021    CALCIUM 9.3 04/13/2022    BILITOT 0.3 07/06/2021    ALKPHOS 95 07/06/2021    AST 19 07/06/2021    ALT 14 07/06/2021       POC Tests: No results for input(s): POCGLU, POCNA, POCK, POCCL, POCBUN, POCHEMO, POCHCT in the last 72 hours.     Coags: No results found for: PROTIME, INR, APTT    HCG (If Applicable): No results found for: PREGTESTUR, PREGSERUM, HCG, HCGQUANT     ABGs: No results found for: PHART, PO2ART, GKN7TGG, HJE0XIJ, BEART, R0DQSZTL     Type & Screen (If Applicable):  No results found for: LABABO, LABRH    Drug/Infectious Status (If Applicable):  No results found for: HIV, HEPCAB    COVID-19 Screening (If Applicable): No results found for: COVID19        Anesthesia Evaluation  Patient summary reviewed and Nursing notes reviewed no history of anesthetic complications:   Airway: Mallampati: II  TM distance: >3 FB   Neck ROM: full  Mouth opening: > = 3 FB Dental: normal exam         Pulmonary: breath sounds clear to auscultation  (+) asthma:     (-) not a current smoker                           Cardiovascular:    (+) hypertension:, valvular problems/murmurs: MR, pacemaker (Placed 2018, no shocks): pacemaker and AICD, past MI:, CHF:, hyperlipidemia      ECG reviewed  Rhythm: regular  Rate: normal  Echocardiogram reviewed         Beta Blocker:  Dose within 24 Hrs         Neuro/Psych:   Negative Neuro/Psych ROS              GI/Hepatic/Renal:   (+) GERD: well controlled,      (-) liver disease and no renal disease      ROS comment: Enteritis  Intractable Nausea. Endo/Other:    (+) DiabetesType II DM, , .          Pt had no PAT visit        ROS comment: Immune deficiency disorder Abdominal:   (+) obese,           Vascular: negative vascular ROS. Other Findings:             Anesthesia Plan      general     ASA 3       Induction: intravenous. MIPS: Postoperative opioids intended and Prophylactic antiemetics administered. Anesthetic plan and risks discussed with patient. Use of blood products discussed with patient whom consented to blood products. Plan discussed with attending. Attending anesthesiologist reviewed and agrees with Preprocedure content            Jennifer Garcia 4/14/2022 SRNA  Agree with above assessment.  Estefania Garnica APRN - CRNA

## 2022-04-14 NOTE — ANESTHESIA POSTPROCEDURE EVALUATION
Department of Anesthesiology  Postprocedure Note    Patient: Shanelle Walls  MRN: [id number]  YOB: 1955  Date of evaluation: 4/14/2022  Time:  8:59 AM     Procedure Summary     Date: 04/14/22 Room / Location: [address] [place] [address] 03 / [address] [facility]    Anesthesia Start: 0800 Anesthesia Stop: 3090    Procedure: LAPAROSCOPIC CHOLECYSTECTOMY (N/A Abdomen) Diagnosis: (BILIARY DYSKINESIA)    Surgeons: Natacha Willis MD Responsible Provider: Dejah Boudreaux MD    Anesthesia Type: general ASA Status: 3          Anesthesia Type: general    Shannan Phase I: Shannan Score: 10    Shannan Phase II:      Last vitals: Reviewed and per EMR flowsheets.        Anesthesia Post Evaluation    Patient location during evaluation: PACU  Patient participation: complete - patient participated  Level of consciousness: awake  Airway patency: patent  Complications: no  Cardiovascular status: hemodynamically stable and blood pressure returned to baseline  Respiratory status: acceptable and face mask

## 2022-04-26 ENCOUNTER — OFFICE VISIT (OUTPATIENT)
Dept: SURGERY | Age: 67
End: 2022-04-26

## 2022-04-26 VITALS
HEIGHT: 61 IN | WEIGHT: 182 LBS | SYSTOLIC BLOOD PRESSURE: 145 MMHG | BODY MASS INDEX: 34.36 KG/M2 | DIASTOLIC BLOOD PRESSURE: 98 MMHG | TEMPERATURE: 97 F | HEART RATE: 88 BPM

## 2022-04-26 DIAGNOSIS — Z09 POSTOPERATIVE EXAMINATION: Primary | ICD-10-CM

## 2022-04-26 PROCEDURE — 99024 POSTOP FOLLOW-UP VISIT: CPT | Performed by: SURGERY

## 2022-04-26 NOTE — PROGRESS NOTES
General Surgery Office Note  [facility] Surgery  Consandre OMARI Daley MD    Patient's Name/Date of Birth: Spenser Zamudio / 1955    Date: April 26, 2022     Chief compaint: Postop visit from laparoscopic cholecystectomy    Surgeon: Topher Daley MD    Patient Active Problem List   Diagnosis    Diabetes mellitus ([facility] [address]    Hypertension    Asthma    Hyperlipidemia    Vitamin D deficiency    Microalbuminuria due to type 2 diabetes mellitus ([facility] [address] [facility]    Enteritis    Intractable vomiting    Dilated cbd, acquired       Subjective: Doing well, no new complaints, pain prior to surgery has resolved, tolerating diet, bowel function normal, anxious to return to normal physical activity    Objective:  BP (!) 145/98   Pulse 88   Temp 97 °F (36.1 °C)   Ht 5' 1\" (1.549 m)   Wt 182 lb (82.6 kg)   BMI 34.39 kg/m²   Labs:  No results for input(s): WBC, HGB, HCT in the last 72 hours. Invalid input(s): PLR  Lab Results   Component Value Date    CREATININE 2.2 (H) 04/13/2022    BUN 46 (H) 04/13/2022     04/13/2022    K 5.0 04/13/2022     04/13/2022    CO2 23 04/13/2022     No results for input(s): LIPASE, AMYLASE in the last 72 hours. General appearance: AA, NAD  HEENT: NCAT, PERRLA, EOMI  Lungs: Clear, equal rise bilateral  Heart: Reg  Abdomen: soft, nondistended, nontender, incisions well healed, no signs of infection, no cellulitis, no hematoma      Pathology: Chronic cholecystitis, cholelithaisis    Assessment/Plan:  Spenser Zamudio is a 79 y.o. female 2 weeks s/p laparoscopic cholecystectomy. Doing well.     Resume activity gradually   No heavy lifting more than 20lbs for 4 weeks total  Pathology reviewed   Follow up as needed    Physician Signature: Electronically signed by Dr. Topher Daley  4/26/2022  10:33 AM

## 2023-02-19 ENCOUNTER — HOSPITAL ENCOUNTER (EMERGENCY)
Age: 68
Discharge: HOME | End: 2023-02-19
Attending: FAMILY MEDICINE
Payer: COMMERCIAL

## 2023-02-19 VITALS
HEIGHT: 61 IN | RESPIRATION RATE: 16 BRPM | OXYGEN SATURATION: 100 % | WEIGHT: 180 LBS | TEMPERATURE: 96.6 F | DIASTOLIC BLOOD PRESSURE: 98 MMHG | BODY MASS INDEX: 33.99 KG/M2 | HEART RATE: 78 BPM | SYSTOLIC BLOOD PRESSURE: 177 MMHG

## 2023-02-19 DIAGNOSIS — H10.9 CONJUNCTIVITIS OF LEFT EYE, UNSPECIFIED CONJUNCTIVITIS TYPE: Primary | ICD-10-CM

## 2023-02-19 PROCEDURE — 99283 EMERGENCY DEPT VISIT LOW MDM: CPT

## 2023-02-19 RX ORDER — TOBRAMYCIN 3 MG/ML
1 SOLUTION/ DROPS OPHTHALMIC EVERY 4 HOURS
Qty: 5 ML | Refills: 0 | Status: SHIPPED | OUTPATIENT
Start: 2023-02-19 | End: 2023-03-01

## 2023-02-19 NOTE — ED PROVIDER NOTES
HPI:  2/19/23,   Time: 6:11 PM ANDREY Dumont is a 79 y.o. female presenting to the ED for a day history of left eye irritation and feeling itchy. She noticed a bump on the eyelid yesterday as well. She complains of irritation on the upper eyelid area. This was preceded few days ago by some lower eyelid irritation that resolved. She does not wear contact lenses. ROS:   Pertinent positives and negatives are stated within HPI, all other systems reviewed and are negative.  --------------------------------------------- PAST HISTORY ---------------------------------------------  Past Medical History:  has a past medical history of Arthritis, Asthma, Diabetes mellitus ([facility] [address] GERD (gastroesophageal reflux disease), Hyperlipidemia, Hypertension, Immune deficiency disorder ([facility] [address] MI, old, Microalbuminuria due to type 2 diabetes mellitus ([facility] [address] Mitral valve regurgitation, and Vitamin D deficiency. Past Surgical History:  has a past surgical history that includes Hysterectomy; fracture surgery; Breast surgery; Colonoscopy; Upper gastrointestinal endoscopy (N/A, 7/9/2021); pacemaker placement (79704); and Cholecystectomy, laparoscopic (N/A, 4/14/2022). Social History:  reports that she has never smoked. She has never used smokeless tobacco. She reports current alcohol use of about 1.0 standard drink per week. She reports that she does not use drugs. Family History: family history includes Diabetes in her mother; High Blood Pressure in her mother; High Cholesterol in her mother; Vision Loss in her mother. The patients home medications have been reviewed. Allergies: Latex and Penicillins    -------------------------------------------------- RESULTS -------------------------------------------------  All laboratory and radiology results have been personally reviewed by myself   LABS:  No results found for this visit on 02/19/23.     RADIOLOGY:  Interpreted by Radiologist.  No orders to display       ------------------------- NURSING NOTES AND VITALS REVIEWED ---------------------------   The nursing notes within the ED encounter and vital signs as below have been reviewed. BP (!) 177/98   Pulse 78   Temp (!) 96.6 °F (35.9 °C) (Temporal)   Resp 16   Ht 5' 1\" (1.549 m)   Wt 180 lb (81.6 kg)   SpO2 100%   BMI 34.01 kg/m²   Oxygen Saturation Interpretation: Normal      ---------------------------------------------------PHYSICAL EXAM--------------------------------------    Constitutional/General: Alert and oriented x3, well appearing, non toxic in NAD  Head: NC/AT  Eyes: PERRL, EOMI; the conjunctiva of the left eye is injected. Eyelids are nontender and nonswollen. Mouth: Oropharynx clear, handling secretions, no trismus  Neck: Supple, full ROM, no meningeal signs  Pulmonary: Lungs clear to auscultation bilaterally, no wheezes, rales, or rhonchi. Not in respiratory distress  Cardiovascular:  Regular rate and rhythm, no murmurs, gallops, or rubs. 2+ distal pulses  Abdomen: Soft, non tender, non distended,   Extremities: Moves all extremities x 4. Warm and well perfused  Skin: warm and dry without rash  Neurologic: GCS 15,  Psych: Normal Affect      ------------------------------ ED COURSE/MEDICAL DECISION MAKING----------------------  Medications - No data to display      Medical Decision Making: The conjunctiva is injected. The patient may have the beginnings of if there is a lesion or a hordeolum versus a viral back/bacterial infection beginning in the left eye so therefore she will be put on the antibiotic eyedrops and asked to follow-up with her ophthalmologist should symptoms worsen or not be improving in about 5 to 7 days. Counseling: The emergency provider has spoken with the patient and discussed todays results, in addition to providing specific details for the plan of care and counseling regarding the diagnosis and prognosis.   Questions are answered at this time and they are agreeable with the plan.      --------------------------------- IMPRESSION AND DISPOSITION ---------------------------------    IMPRESSION  1.  Conjunctivitis of left eye, unspecified conjunctivitis type        DISPOSITION  Disposition: Discharge to home  Patient condition is stable                 America Gonzalez MD  02/19/23 Romy Maldonado

## 2024-08-20 ENCOUNTER — HOSPITAL ENCOUNTER (EMERGENCY)
Age: 69
Discharge: HOME | End: 2024-08-20
Attending: EMERGENCY MEDICINE
Payer: COMMERCIAL

## 2024-08-20 VITALS
DIASTOLIC BLOOD PRESSURE: 98 MMHG | TEMPERATURE: 98.5 F | HEART RATE: 84 BPM | RESPIRATION RATE: 16 BRPM | OXYGEN SATURATION: 98 % | SYSTOLIC BLOOD PRESSURE: 146 MMHG

## 2024-08-20 DIAGNOSIS — J06.9 ACUTE UPPER RESPIRATORY INFECTION: Primary | ICD-10-CM

## 2024-08-20 LAB
SARS-COV-2 RDRP RESP QL NAA+PROBE: NOT DETECTED
SPECIMEN DESCRIPTION: NORMAL

## 2024-08-20 PROCEDURE — 87635 SARS-COV-2 COVID-19 AMP PRB: CPT

## 2024-08-20 PROCEDURE — 99283 EMERGENCY DEPT VISIT LOW MDM: CPT

## 2024-08-20 RX ORDER — ATORVASTATIN CALCIUM 40 MG/1
40 TABLET, FILM COATED ORAL DAILY
COMMUNITY

## 2024-08-20 RX ORDER — BROMPHENIRAMINE MALEATE, PSEUDOEPHEDRINE HYDROCHLORIDE, AND DEXTROMETHORPHAN HYDROBROMIDE 2; 30; 10 MG/5ML; MG/5ML; MG/5ML
5 SYRUP ORAL 4 TIMES DAILY PRN
Qty: 120 ML | Refills: 0 | Status: SHIPPED | OUTPATIENT
Start: 2024-08-20

## 2024-08-20 RX ORDER — DOXYCYCLINE HYCLATE 100 MG
100 TABLET ORAL 2 TIMES DAILY
Qty: 20 TABLET | Refills: 0 | Status: SHIPPED | OUTPATIENT
Start: 2024-08-20 | End: 2024-08-30

## 2024-08-20 RX ORDER — HYDRALAZINE HYDROCHLORIDE 25 MG/1
25 TABLET, FILM COATED ORAL 3 TIMES DAILY
COMMUNITY

## 2024-08-20 ASSESSMENT — ENCOUNTER SYMPTOMS
ABDOMINAL DISTENTION: 0
DIARRHEA: 0
NAUSEA: 0
EYE DISCHARGE: 0
SHORTNESS OF BREATH: 0
COUGH: 1
SINUS PRESSURE: 0
SORE THROAT: 0
EYE REDNESS: 0
VOMITING: 0
BACK PAIN: 0
EYE PAIN: 0
WHEEZING: 0
RHINORRHEA: 1

## 2024-08-20 ASSESSMENT — PAIN - FUNCTIONAL ASSESSMENT: PAIN_FUNCTIONAL_ASSESSMENT: NONE - DENIES PAIN

## 2024-08-20 NOTE — ED PROVIDER NOTES
CO-WORKER POSITIVE FOR COVID    The history is provided by the patient.   URI  Presenting symptoms: congestion, cough, fatigue and rhinorrhea    Presenting symptoms: no ear pain, no fever and no sore throat    Severity:  Moderate  Onset quality:  Sudden  Duration:  2 days  Chronicity:  New  Associated symptoms: no arthralgias, no headaches and no wheezing         Review of Systems   Constitutional:  Positive for fatigue. Negative for chills and fever.   HENT:  Positive for congestion and rhinorrhea. Negative for ear pain, sinus pressure and sore throat.    Eyes:  Negative for pain, discharge and redness.   Respiratory:  Positive for cough. Negative for shortness of breath and wheezing.    Cardiovascular:  Negative for chest pain.   Gastrointestinal:  Negative for abdominal distention, diarrhea, nausea and vomiting.   Genitourinary:  Negative for dysuria and frequency.   Musculoskeletal:  Negative for arthralgias and back pain.   Skin:  Negative for rash and wound.   Neurological:  Negative for weakness and headaches.   Hematological:  Negative for adenopathy.   All other systems reviewed and are negative.       Physical Exam  Vitals and nursing note reviewed.   Constitutional:       Appearance: She is well-developed.   HENT:      Head: Normocephalic and atraumatic.      Right Ear: Tympanic membrane is retracted.      Left Ear: Tympanic membrane is retracted.      Nose: Mucosal edema and congestion present.   Eyes:      Pupils: Pupils are equal, round, and reactive to light.   Cardiovascular:      Rate and Rhythm: Normal rate and regular rhythm.      Heart sounds: Normal heart sounds. No murmur heard.  Pulmonary:      Effort: Pulmonary effort is normal. No respiratory distress.      Breath sounds: Normal breath sounds. No wheezing or rales.   Abdominal:      General: Bowel sounds are normal.      Palpations: Abdomen is soft.      Tenderness: There is no abdominal tenderness. There is no guarding or rebound.

## 2024-08-30 ENCOUNTER — HOSPITAL ENCOUNTER (EMERGENCY)
Age: 69
Discharge: HOME | End: 2024-08-30
Attending: STUDENT IN AN ORGANIZED HEALTH CARE EDUCATION/TRAINING PROGRAM
Payer: COMMERCIAL

## 2024-08-30 VITALS
HEART RATE: 85 BPM | OXYGEN SATURATION: 100 % | DIASTOLIC BLOOD PRESSURE: 78 MMHG | TEMPERATURE: 97.7 F | RESPIRATION RATE: 18 BRPM | SYSTOLIC BLOOD PRESSURE: 113 MMHG
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
## 2024-08-30 DIAGNOSIS — R74.8 ELEVATED CK: ICD-10-CM

## 2024-08-30 DIAGNOSIS — E86.0 MILD DEHYDRATION: ICD-10-CM

## 2024-08-30 DIAGNOSIS — N18.9 CHRONIC KIDNEY DISEASE, UNSPECIFIED CKD STAGE: ICD-10-CM

## 2024-08-30 DIAGNOSIS — R52 BODY ACHES: ICD-10-CM

## 2024-08-30 DIAGNOSIS — E16.2 HYPOGLYCEMIA: ICD-10-CM

## 2024-08-30 LAB
ALBUMIN SERPL-MCNC: 3.9 G/DL (ref 3.5–5.2)
ALP SERPL-CCNC: 169 U/L (ref 35–104)
ALT SERPL-CCNC: 27 U/L (ref 0–32)
ANION GAP SERPL CALCULATED.3IONS-SCNC: 11 MMOL/L (ref 7–16)
AST SERPL-CCNC: 54 U/L (ref 0–31)
BASOPHILS # BLD: 0.02 K/UL (ref 0–0.2)
BASOPHILS NFR BLD: 0 % (ref 0–2)
BILIRUB SERPL-MCNC: 0.5 MG/DL (ref 0–1.2)
BILIRUB UR QL STRIP: NEGATIVE
BUN SERPL-MCNC: 50 MG/DL (ref 6–23)
CALCIUM SERPL-MCNC: 8.9 MG/DL (ref 8.6–10.2)
CHLORIDE SERPL-SCNC: 102 MMOL/L (ref 98–107)
CK SERPL-CCNC: 430 U/L (ref 20–180)
CLARITY UR: CLEAR
CO2 SERPL-SCNC: 22 MMOL/L (ref 22–29)
COLOR UR: YELLOW
CREAT SERPL-MCNC: 2.2 MG/DL (ref 0.5–1)
EOSINOPHIL # BLD: 0.05 K/UL (ref 0.05–0.5)
EOSINOPHILS RELATIVE PERCENT: 1 % (ref 0–6)
ERYTHROCYTE [DISTWIDTH] IN BLOOD BY AUTOMATED COUNT: 15.4 % (ref 11.5–15)
GFR, ESTIMATED: 23 ML/MIN/1.73M2
GLUCOSE BLD-MCNC: 112 MG/DL (ref 74–99)
GLUCOSE BLD-MCNC: 63 MG/DL (ref 74–99)
GLUCOSE BLD-MCNC: 96 MG/DL (ref 74–99)
GLUCOSE SERPL-MCNC: 32 MG/DL (ref 74–99)
GLUCOSE UR STRIP-MCNC: NEGATIVE MG/DL
HCT VFR BLD AUTO: 42.5 % (ref 34–48)
HGB BLD-MCNC: 13.8 G/DL (ref 11.5–15.5)
HGB UR QL STRIP.AUTO: ABNORMAL
IMM GRANULOCYTES # BLD AUTO: 0.07 K/UL (ref 0–0.58)
IMM GRANULOCYTES NFR BLD: 1 % (ref 0–5)
KETONES UR STRIP-MCNC: NEGATIVE MG/DL
LEUKOCYTE ESTERASE UR QL STRIP: NEGATIVE
LYMPHOCYTES NFR BLD: 1.06 K/UL (ref 1.5–4)
LYMPHOCYTES RELATIVE PERCENT: 17 % (ref 20–42)
MCH RBC QN AUTO: 27.2 PG (ref 26–35)
MCHC RBC AUTO-ENTMCNC: 32.5 G/DL (ref 32–34.5)
MCV RBC AUTO: 83.7 FL (ref 80–99.9)
MONOCYTES NFR BLD: 0.64 K/UL (ref 0.1–0.95)
MONOCYTES NFR BLD: 10 % (ref 2–12)
NEUTROPHILS NFR BLD: 71 % (ref 43–80)
NEUTS SEG NFR BLD: 4.44 K/UL (ref 1.8–7.3)
NITRITE UR QL STRIP: NEGATIVE
PH UR STRIP: 6 [PH] (ref 5–9)
PLATELET # BLD AUTO: 385 K/UL (ref 130–450)
PMV BLD AUTO: 11.9 FL (ref 7–12)
POTASSIUM SERPL-SCNC: 4.5 MMOL/L (ref 3.5–5)
PROT SERPL-MCNC: 7.7 G/DL (ref 6.4–8.3)
PROT UR STRIP-MCNC: >=300 MG/DL
RBC # BLD AUTO: 5.08 M/UL (ref 3.5–5.5)
RBC #/AREA URNS HPF: ABNORMAL /HPF
SODIUM SERPL-SCNC: 135 MMOL/L (ref 132–146)
SP GR UR STRIP: >1.03 (ref 1–1.03)
UROBILINOGEN UR STRIP-ACNC: 0.2 EU/DL (ref 0–1)
WBC #/AREA URNS HPF: ABNORMAL /HPF
WBC OTHER # BLD: 6.3 K/UL (ref 4.5–11.5)

## 2024-08-30 PROCEDURE — 96360 HYDRATION IV INFUSION INIT: CPT

## 2024-08-30 PROCEDURE — 82550 ASSAY OF CK (CPK): CPT

## 2024-08-30 PROCEDURE — 85025 COMPLETE CBC W/AUTO DIFF WBC: CPT

## 2024-08-30 PROCEDURE — 6370000000 HC RX 637 (ALT 250 FOR IP): Performed by: STUDENT IN AN ORGANIZED HEALTH CARE EDUCATION/TRAINING PROGRAM

## 2024-08-30 PROCEDURE — 82962 GLUCOSE BLOOD TEST: CPT

## 2024-08-30 PROCEDURE — 2580000003 HC RX 258: Performed by: STUDENT IN AN ORGANIZED HEALTH CARE EDUCATION/TRAINING PROGRAM

## 2024-08-30 PROCEDURE — 99284 EMERGENCY DEPT VISIT MOD MDM: CPT

## 2024-08-30 PROCEDURE — 81001 URINALYSIS AUTO W/SCOPE: CPT

## 2024-08-30 PROCEDURE — 80053 COMPREHEN METABOLIC PANEL: CPT

## 2024-08-30 RX ORDER — 0.9 % SODIUM CHLORIDE 0.9 %
500 INTRAVENOUS SOLUTION INTRAVENOUS ONCE
Status: COMPLETED | OUTPATIENT
Start: 2024-08-30 | End: 2024-08-30

## 2024-08-30 RX ORDER — METHOCARBAMOL 750 MG/1
750 TABLET, FILM COATED ORAL 4 TIMES DAILY PRN
Qty: 20 TABLET | Refills: 0 | Status: SHIPPED | OUTPATIENT
Start: 2024-08-30

## 2024-08-30 RX ORDER — METHOCARBAMOL 500 MG/1
750 TABLET, FILM COATED ORAL ONCE
Status: COMPLETED | OUTPATIENT
Start: 2024-08-30 | End: 2024-08-30

## 2024-08-30 RX ADMIN — SODIUM CHLORIDE 500 ML: 9 INJECTION, SOLUTION INTRAVENOUS at 18:17

## 2024-08-30 RX ADMIN — METHOCARBAMOL 750 MG: 500 TABLET ORAL at 18:17

## 2024-08-30 ASSESSMENT — LIFESTYLE VARIABLES
HOW OFTEN DO YOU HAVE A DRINK CONTAINING ALCOHOL: NEVER
HOW MANY STANDARD DRINKS CONTAINING ALCOHOL DO YOU HAVE ON A TYPICAL DAY: PATIENT DOES NOT DRINK
HOW OFTEN DO YOU HAVE A DRINK CONTAINING ALCOHOL: NEVER
HOW MANY STANDARD DRINKS CONTAINING ALCOHOL DO YOU HAVE ON A TYPICAL DAY: PATIENT DOES NOT DRINK

## 2024-08-30 ASSESSMENT — ENCOUNTER SYMPTOMS
BACK PAIN: 0
COUGH: 0
COLOR CHANGE: 0
VOMITING: 0
CONSTIPATION: 0
SHORTNESS OF BREATH: 0
ABDOMINAL PAIN: 0
NAUSEA: 0
DIARRHEA: 0

## 2024-08-30 ASSESSMENT — PAIN SCALES - GENERAL: PAINLEVEL_OUTOF10: 1

## 2024-08-30 ASSESSMENT — PAIN DESCRIPTION - LOCATION: LOCATION: GENERALIZED

## 2024-08-30 ASSESSMENT — PAIN DESCRIPTION - DESCRIPTORS: DESCRIPTORS: DISCOMFORT

## 2024-08-30 NOTE — ED PROVIDER NOTES
[facility] EMERGENCY DEPARTMENT  EMERGENCY DEPARTMENT ENCOUNTER        Pt Name: Sabrnia Bajwa  MRN: [id number]  Birthdate 1955  Date of evaluation: 8/30/2024  Provider: Lynne Navarro DO  PCP: Jesse Rojas DO  Note Started: 5:36 PM EDT 8/30/24    CHIEF COMPLAINT       Chief Complaint   Patient presents with    Fall     Tripped and fell and got stuck between the bed and the wall for several hours. No loc. -loc, - head injury       HISTORY OF PRESENT ILLNESS: 1 or more Elements     Limitations to history : None    Sabrina Bajwa is a 69 y.o. female with past medical history of HTN, HLD, CHF on Bumex, non-insulin-dependent DM2.  She presents to the ED for evaluation of fatigue, body aches and general weakness and difficulty ambulating.  This started today; last night around 10 PM the patient fell after getting out of bed, she tripped and fell towards her dresser and says that she got stuck between the dresser and the bed for several hours; she was not able to get out from that spot until about 10 AM today.  She said she was assisted by a friend.  Because of her symptoms today she felt that she should come to the ER to be seen.  Her friend brought her to the ER.  The patient was able to ambulate well enough to get herself dressed, gather her things in order to make a visit to the ER.  She does not have any extremity weakness.  She reports achiness all over her body especially in her legs.  She reports some edema of her ankles bilaterally which is chronic; she is on Bumex and her last EF was in February 2024 and showed EF 20-25%.  When she fell she did not hit her head or lose consciousness.  She denies any numbness anywhere, no headache dizziness or changes in vision.  She has not had any chest pain palpitations or shortness of breath.  No lower extremity redness or warmth.  She is not on blood thinner medications.  No abnormal urinary symptoms.  No rashes

## 2025-07-09 ENCOUNTER — TRANSCRIBE ORDERS (OUTPATIENT)
Dept: ADMINISTRATIVE | Age: 70
End: 2025-07-09

## 2025-07-09 DIAGNOSIS — R60.0 LOCALIZED EDEMA: ICD-10-CM

## 2025-07-09 DIAGNOSIS — Z12.31 ENCOUNTER FOR SCREENING MAMMOGRAM FOR MALIGNANT NEOPLASM OF BREAST: Primary | ICD-10-CM

## 2025-07-10 ENCOUNTER — APPOINTMENT (OUTPATIENT)
Dept: CT IMAGING | Age: 70
DRG: 064 | End: 2025-07-10
Payer: COMMERCIAL

## 2025-07-10 ENCOUNTER — HOSPITAL ENCOUNTER (INPATIENT)
Age: 70
LOS: 10 days | Discharge: ANOTHER HOSPITAL | DRG: 064 | End: 2025-07-20
Attending: STUDENT IN AN ORGANIZED HEALTH CARE EDUCATION/TRAINING PROGRAM | Admitting: INTERNAL MEDICINE
Payer: COMMERCIAL

## 2025-07-10 ENCOUNTER — APPOINTMENT (OUTPATIENT)
Dept: MRI IMAGING | Age: 70
DRG: 064 | End: 2025-07-10
Payer: COMMERCIAL

## 2025-07-10 ENCOUNTER — APPOINTMENT (OUTPATIENT)
Dept: GENERAL RADIOLOGY | Age: 70
DRG: 064 | End: 2025-07-10
Payer: COMMERCIAL

## 2025-07-10 DIAGNOSIS — R79.89 ELEVATED TROPONIN: ICD-10-CM

## 2025-07-10 DIAGNOSIS — R41.82 ALTERED MENTAL STATUS, UNSPECIFIED ALTERED MENTAL STATUS TYPE: Primary | ICD-10-CM

## 2025-07-10 DIAGNOSIS — I63.9 ACUTE CVA (CEREBROVASCULAR ACCIDENT) (HCC): ICD-10-CM

## 2025-07-10 DIAGNOSIS — N18.30 CHRONIC RENAL IMPAIRMENT, STAGE 3 (MODERATE), UNSPECIFIED WHETHER STAGE 3A OR 3B CKD (HCC): ICD-10-CM

## 2025-07-10 PROBLEM — I66.9 CEREBRAL ARTERY OCCLUSION: Status: ACTIVE | Noted: 2025-07-10

## 2025-07-10 LAB
ALBUMIN SERPL-MCNC: 3.8 G/DL (ref 3.5–5.2)
ALP SERPL-CCNC: 138 U/L (ref 35–104)
ALT SERPL-CCNC: 26 U/L (ref 0–35)
AMMONIA PLAS-SCNC: 25 UMOL/L (ref 11–51)
AMPHET UR QL SCN: NEGATIVE
ANION GAP SERPL CALCULATED.3IONS-SCNC: 15 MMOL/L (ref 7–16)
APAP SERPL-MCNC: <5 UG/ML (ref 10–30)
AST SERPL-CCNC: 26 U/L (ref 0–35)
BARBITURATES UR QL SCN: NEGATIVE
BASOPHILS # BLD: 0.07 K/UL (ref 0–0.2)
BASOPHILS NFR BLD: 2 % (ref 0–2)
BENZODIAZ UR QL: NEGATIVE
BILIRUB DIRECT SERPL-MCNC: 0.4 MG/DL (ref 0–0.2)
BILIRUB INDIRECT SERPL-MCNC: 0.3 MG/DL (ref 0–1)
BILIRUB SERPL-MCNC: 0.7 MG/DL (ref 0–1.2)
BNP SERPL-MCNC: ABNORMAL PG/ML (ref 0–125)
BUN SERPL-MCNC: 54 MG/DL (ref 8–23)
BUPRENORPHINE UR QL: NEGATIVE
CALCIUM SERPL-MCNC: 8.4 MG/DL (ref 8.8–10.2)
CANNABINOIDS UR QL SCN: NEGATIVE
CHLORIDE SERPL-SCNC: 104 MMOL/L (ref 98–107)
CHP ED QC CHECK: YES
CO2 SERPL-SCNC: 20 MMOL/L (ref 22–29)
COCAINE UR QL SCN: NEGATIVE
CREAT SERPL-MCNC: 3.1 MG/DL (ref 0.5–1)
EOSINOPHIL # BLD: 0.23 K/UL (ref 0.05–0.5)
EOSINOPHILS RELATIVE PERCENT: 6 % (ref 0–6)
ERYTHROCYTE [DISTWIDTH] IN BLOOD BY AUTOMATED COUNT: 15.2 % (ref 11.5–15)
ETHANOLAMINE SERPL-MCNC: <10 MG/DL (ref 0–0.08)
FENTANYL UR QL: NEGATIVE
GFR, ESTIMATED: 15 ML/MIN/1.73M2
GLUCOSE BLD-MCNC: 178 MG/DL (ref 74–99)
GLUCOSE BLD-MCNC: 186 MG/DL
GLUCOSE BLD-MCNC: 186 MG/DL (ref 74–99)
GLUCOSE SERPL-MCNC: 184 MG/DL (ref 74–99)
HCT VFR BLD AUTO: 36.8 % (ref 34–48)
HGB BLD-MCNC: 11.7 G/DL (ref 11.5–15.5)
IMM GRANULOCYTES # BLD AUTO: <0.03 K/UL (ref 0–0.58)
IMM GRANULOCYTES NFR BLD: 0 % (ref 0–5)
INR PPP: 1.1
LIPASE SERPL-CCNC: 30 U/L (ref 13–60)
LYMPHOCYTES NFR BLD: 1.05 K/UL (ref 1.5–4)
LYMPHOCYTES RELATIVE PERCENT: 25 % (ref 20–42)
MAGNESIUM SERPL-MCNC: 1.4 MG/DL (ref 1.6–2.4)
MCH RBC QN AUTO: 27.1 PG (ref 26–35)
MCHC RBC AUTO-ENTMCNC: 31.8 G/DL (ref 32–34.5)
MCV RBC AUTO: 85.2 FL (ref 80–99.9)
METHADONE UR QL: NEGATIVE
MONOCYTES NFR BLD: 0.48 K/UL (ref 0.1–0.95)
MONOCYTES NFR BLD: 12 % (ref 2–12)
NEUTROPHILS NFR BLD: 56 % (ref 43–80)
NEUTS SEG NFR BLD: 2.34 K/UL (ref 1.8–7.3)
OPIATES UR QL SCN: NEGATIVE
OXYCODONE UR QL SCN: NEGATIVE
PARTIAL THROMBOPLASTIN TIME: 23.1 SEC (ref 24.5–35.1)
PCP UR QL SCN: NEGATIVE
PLATELET CONFIRMATION: NORMAL
PLATELET, FLUORESCENCE: 257 K/UL (ref 130–450)
PMV BLD AUTO: 11.9 FL (ref 7–12)
POTASSIUM SERPL-SCNC: 4.1 MMOL/L (ref 3.5–5.1)
PROT SERPL-MCNC: 6.8 G/DL (ref 6.4–8.3)
PROTHROMBIN TIME: 12.2 SEC (ref 9.3–12.4)
RBC # BLD AUTO: 4.32 M/UL (ref 3.5–5.5)
SALICYLATES SERPL-MCNC: <0.5 MG/DL (ref 0–30)
SODIUM SERPL-SCNC: 139 MMOL/L (ref 136–145)
T4 SERPL-MCNC: 5.5 UG/DL (ref 4.5–11.7)
TEST INFORMATION: NORMAL
TROPONIN I SERPL HS-MCNC: 102 NG/L (ref 0–14)
TROPONIN I SERPL HS-MCNC: 99 NG/L (ref 0–14)
TSH SERPL DL<=0.05 MIU/L-ACNC: 4.8 UIU/ML (ref 0.27–4.2)
WBC OTHER # BLD: 4.2 K/UL (ref 4.5–11.5)

## 2025-07-10 PROCEDURE — 80143 DRUG ASSAY ACETAMINOPHEN: CPT

## 2025-07-10 PROCEDURE — 70450 CT HEAD/BRAIN W/O DYE: CPT

## 2025-07-10 PROCEDURE — 80053 COMPREHEN METABOLIC PANEL: CPT

## 2025-07-10 PROCEDURE — 93005 ELECTROCARDIOGRAM TRACING: CPT | Performed by: EMERGENCY MEDICINE

## 2025-07-10 PROCEDURE — 83735 ASSAY OF MAGNESIUM: CPT

## 2025-07-10 PROCEDURE — 85730 THROMBOPLASTIN TIME PARTIAL: CPT

## 2025-07-10 PROCEDURE — 51702 INSERT TEMP BLADDER CATH: CPT

## 2025-07-10 PROCEDURE — 82962 GLUCOSE BLOOD TEST: CPT

## 2025-07-10 PROCEDURE — G0480 DRUG TEST DEF 1-7 CLASSES: HCPCS

## 2025-07-10 PROCEDURE — 6360000004 HC RX CONTRAST MEDICATION: Performed by: RADIOLOGY

## 2025-07-10 PROCEDURE — 85610 PROTHROMBIN TIME: CPT

## 2025-07-10 PROCEDURE — 70498 CT ANGIOGRAPHY NECK: CPT

## 2025-07-10 PROCEDURE — 99449 NTRPROF PH1/NTRNET/EHR 31/>: CPT | Performed by: PSYCHIATRY & NEUROLOGY

## 2025-07-10 PROCEDURE — 83880 ASSAY OF NATRIURETIC PEPTIDE: CPT

## 2025-07-10 PROCEDURE — 99285 EMERGENCY DEPT VISIT HI MDM: CPT

## 2025-07-10 PROCEDURE — 82140 ASSAY OF AMMONIA: CPT

## 2025-07-10 PROCEDURE — 84484 ASSAY OF TROPONIN QUANT: CPT

## 2025-07-10 PROCEDURE — 51798 US URINE CAPACITY MEASURE: CPT

## 2025-07-10 PROCEDURE — 6370000000 HC RX 637 (ALT 250 FOR IP)

## 2025-07-10 PROCEDURE — 80179 DRUG ASSAY SALICYLATE: CPT

## 2025-07-10 PROCEDURE — 70496 CT ANGIOGRAPHY HEAD: CPT

## 2025-07-10 PROCEDURE — 80307 DRUG TEST PRSMV CHEM ANLYZR: CPT

## 2025-07-10 PROCEDURE — 84443 ASSAY THYROID STIM HORMONE: CPT

## 2025-07-10 PROCEDURE — 2580000003 HC RX 258: Performed by: EMERGENCY MEDICINE

## 2025-07-10 PROCEDURE — 82248 BILIRUBIN DIRECT: CPT

## 2025-07-10 PROCEDURE — 71045 X-RAY EXAM CHEST 1 VIEW: CPT

## 2025-07-10 PROCEDURE — 84436 ASSAY OF TOTAL THYROXINE: CPT

## 2025-07-10 PROCEDURE — 73110 X-RAY EXAM OF WRIST: CPT

## 2025-07-10 PROCEDURE — 2060000000 HC ICU INTERMEDIATE R&B

## 2025-07-10 PROCEDURE — 83690 ASSAY OF LIPASE: CPT

## 2025-07-10 PROCEDURE — 85025 COMPLETE CBC W/AUTO DIFF WBC: CPT

## 2025-07-10 RX ORDER — ASPIRIN 81 MG/1
324 TABLET, CHEWABLE ORAL ONCE
Status: COMPLETED | OUTPATIENT
Start: 2025-07-10 | End: 2025-07-10

## 2025-07-10 RX ORDER — ACETAMINOPHEN 325 MG/1
650 TABLET ORAL EVERY 6 HOURS PRN
Status: DISCONTINUED | OUTPATIENT
Start: 2025-07-10 | End: 2025-07-21 | Stop reason: HOSPADM

## 2025-07-10 RX ORDER — INSULIN LISPRO 100 [IU]/ML
0-8 INJECTION, SOLUTION INTRAVENOUS; SUBCUTANEOUS
Status: DISCONTINUED | OUTPATIENT
Start: 2025-07-10 | End: 2025-07-21 | Stop reason: HOSPADM

## 2025-07-10 RX ORDER — ASPIRIN 81 MG/1
81 TABLET, CHEWABLE ORAL DAILY
Status: DISCONTINUED | OUTPATIENT
Start: 2025-07-11 | End: 2025-07-21 | Stop reason: HOSPADM

## 2025-07-10 RX ORDER — PANTOPRAZOLE SODIUM 40 MG/1
40 TABLET, DELAYED RELEASE ORAL
Status: DISCONTINUED | OUTPATIENT
Start: 2025-07-11 | End: 2025-07-21 | Stop reason: HOSPADM

## 2025-07-10 RX ORDER — MECOBALAMIN 5000 MCG
5 TABLET,DISINTEGRATING ORAL NIGHTLY PRN
Status: DISCONTINUED | OUTPATIENT
Start: 2025-07-10 | End: 2025-07-21 | Stop reason: HOSPADM

## 2025-07-10 RX ORDER — PROCHLORPERAZINE EDISYLATE 5 MG/ML
10 INJECTION INTRAMUSCULAR; INTRAVENOUS EVERY 6 HOURS PRN
Status: DISCONTINUED | OUTPATIENT
Start: 2025-07-10 | End: 2025-07-21 | Stop reason: HOSPADM

## 2025-07-10 RX ORDER — IOPAMIDOL 755 MG/ML
75 INJECTION, SOLUTION INTRAVASCULAR
Status: COMPLETED | OUTPATIENT
Start: 2025-07-10 | End: 2025-07-10

## 2025-07-10 RX ORDER — LEVOTHYROXINE SODIUM 50 UG/1
50 TABLET ORAL DAILY
Status: DISCONTINUED | OUTPATIENT
Start: 2025-07-11 | End: 2025-07-21 | Stop reason: HOSPADM

## 2025-07-10 RX ORDER — CLOPIDOGREL 300 MG/1
300 TABLET, FILM COATED ORAL ONCE
Status: COMPLETED | OUTPATIENT
Start: 2025-07-10 | End: 2025-07-10

## 2025-07-10 RX ORDER — CLOPIDOGREL BISULFATE 75 MG/1
75 TABLET ORAL DAILY
Status: DISCONTINUED | OUTPATIENT
Start: 2025-07-11 | End: 2025-07-21 | Stop reason: HOSPADM

## 2025-07-10 RX ORDER — GLUCAGON 1 MG/ML
1 KIT INJECTION PRN
Status: DISCONTINUED | OUTPATIENT
Start: 2025-07-10 | End: 2025-07-21 | Stop reason: HOSPADM

## 2025-07-10 RX ORDER — DEXTROSE MONOHYDRATE 100 MG/ML
INJECTION, SOLUTION INTRAVENOUS CONTINUOUS PRN
Status: DISCONTINUED | OUTPATIENT
Start: 2025-07-10 | End: 2025-07-21 | Stop reason: HOSPADM

## 2025-07-10 RX ORDER — 0.9 % SODIUM CHLORIDE 0.9 %
1000 INTRAVENOUS SOLUTION INTRAVENOUS ONCE
Status: COMPLETED | OUTPATIENT
Start: 2025-07-10 | End: 2025-07-10

## 2025-07-10 RX ADMIN — ASPIRIN 324 MG: 81 TABLET, CHEWABLE ORAL at 20:28

## 2025-07-10 RX ADMIN — IOPAMIDOL 75 ML: 755 INJECTION, SOLUTION INTRAVENOUS at 17:05

## 2025-07-10 RX ADMIN — SODIUM CHLORIDE 1000 ML: 0.9 INJECTION, SOLUTION INTRAVENOUS at 15:52

## 2025-07-10 RX ADMIN — IOPAMIDOL 75 ML: 755 INJECTION, SOLUTION INTRAVENOUS at 14:48

## 2025-07-10 RX ADMIN — CLOPIDOGREL BISULFATE 300 MG: 300 TABLET, FILM COATED ORAL at 20:28

## 2025-07-10 ASSESSMENT — LIFESTYLE VARIABLES
HOW MANY STANDARD DRINKS CONTAINING ALCOHOL DO YOU HAVE ON A TYPICAL DAY: PATIENT UNABLE TO ANSWER
HOW OFTEN DO YOU HAVE A DRINK CONTAINING ALCOHOL: PATIENT UNABLE TO ANSWER

## 2025-07-10 NOTE — VIRTUAL HEALTH
[facility] Stroke and Telestroke Consult for  [facility] EMERGENCY DEPT   Stroke Alert through [facility]    Pt Name: Sabrina Bajwa  MRN: [id number]  YOB: 1955  Date of evaluation: 7/10/2025  Primary Care Physician: Jesse Rojas DO  Reason for Evaluation: Stroke evaluation with Phone Consult, Discussion and Review of imaging    Sabrina Bajwa is a 70 y.o. female with pmh as listed below. Per report, she presents with AMS. Pt was reportedly with coworkers when she slumped down her chair and became mute. Able to move all extremities and following some commands but stopped speaking. NIH 14    LKW: 1200  NIH:  14 per ED provider    Allergies  is allergic to latex and penicillins.  Medications  Prior to Admission medications    Medication Sig Start Date End Date Taking? Authorizing Provider   methocarbamol (ROBAXIN-750) 750 MG tablet Take 1 tablet by mouth 4 times daily as needed (pain, muscle relaxers) 8/30/24   Lynne Navarro DO   linagliptin (TRADJENTA) 5 MG tablet Take 1 tablet by mouth daily    ProviderOlivia MD   hydrALAZINE (APRESOLINE) 25 MG tablet Take 1 tablet by mouth 3 times daily    Olivia Turcios MD   atorvastatin (LIPITOR) 40 MG tablet Take 1 tablet by mouth daily    Olivia Turcios MD   brompheniramine-pseudoephedrine-DM 2-30-10 MG/5ML syrup Take 5 mLs by mouth 4 times daily as needed for Congestion or Cough 8/20/24   Dionna Glasgow MD   ondansetron (ZOFRAN) 4 MG tablet Take 1 tablet by mouth 3 times daily as needed for Nausea or Vomiting  Patient not taking: Reported on 8/20/2024 4/14/22   Ernie Medina MD   ipratropium (ATROVENT) 0.06 % nasal spray use 1 to 2 sprays in each nostril every 8 hours as needed for sinus congestion and drainage 10/5/21   Olivia Turcios MD   metoprolol succinate (TOPROL XL) 50 MG extended release tablet TAKE 1 & 1/2 TABLETS BY MOUTH TWICE DAILY 12/24/20   Olivia Turcios MD   NONFORMULARY    Temp 98.2 °F (36.8 °C) (Temporal)   Resp 20   Wt 83.9 kg (185 lb)   SpO2 100%   BMI 34.96 kg/m²     Imaging:  Images were personally reviewed with VIZ.AI and PACS used to review images including:  CT brain without contrast: no hemorrhage, no large hypodensity  CTA imaging: no large vessel occlusion  Radiology final reads pending.    Assessment    Differential DDx:  Very likely metabolic encephalopathy due to multiple laboratory abnormalities including LYDIA/CKD Cr >3, BNP 22,500, TSH 4.80 etc as noted in the chart,  Less likely to be TIA/AIS given clinical presentation and history, but not ruled out  Consider toxic/metabolic/infectious workup      Recommendations:  NIH 14 per ED provider  Recommend Inpatient Neurology Consult for further assessment and evaluation   Patient presents with stroke-like symptoms but is not an IV thrombolytic candidate due to:alternative diagnosis more likely most likely metabolic encephalopathy  Aspirin 325 x1  MRI brain  F/up MRI to discuss if ongoing antiplatelet therapy would be beneficial  Continue metabolic workup and management  LDL, A1c, TTE, and workup per primary team  Rest of care per primary team        Discussed with Attending physician      At least 35 min of Telemedicine and time in conversation directly with ED staff and physician for the patient who is in imminent and life threatening deterioration without further treatment and evaluation.  This Virtual Visit was conducted with patient's (and/or legal guardian's) consent, to provide telestroke consultation and necessary medical care.  Time spent examining patient, reviewing the images personally, reviewing the chart, perform high complexity decision making and speaking with the nursing staff regarding recommendations      Telestroke: This is a Phone Consult, I have not seen the patient face to face, the telemedicine device was not utilized.  [facility] EMERGENCY DEPT      Gurinder Martinez MD  Stroke, Neurocritical Care

## 2025-07-10 NOTE — ED NOTES
Patient's daughter called requesting updates at this time. States she does not want to talk to an RN and that she would like to talk to a doctor. Provider that assumed care of patient, Dr. Mendieta notified. Daughter remained on hold for 30 minutes. This RN asked daughter if she would like to be updated about status instead of remaining on hold. While asked, a recorded message stating \"this call is now being recorded\" was heard. Daughter states \"No, the doctor can call me back\". Contact information verified.

## 2025-07-10 NOTE — ED PROVIDER NOTES
Chief complaint:  Altered mental status      HPI history provided by coworker  Patient here from work, sent in for acute change in mental status, she was apparently talking to coworkers when she slumped down in her chair and became mute.  She was still awake and looking around to move her arms and legs but stopped talking.  Coworker states she did this a few months ago and was seen for it.  Upon arrival patient gives no verbal information.    Review of Systems   Unable to perform ROS: Mental status change        Physical Exam  Vitals and nursing note reviewed.   Constitutional:       General: She is awake.      Appearance: She is well-developed.      Comments: Patient awake and looking around.  She follows some commands and at 1 point says \"ow\" as a response but is otherwise nonverbal.  She is spontaneously moving her upper extremities but does not give any movement to the lower extremities during testing.   HENT:      Head: Normocephalic and atraumatic.      Comments: No sign of acute head or face injuries  Eyes:      General: No scleral icterus.     Extraocular Movements:      Right eye: No nystagmus.      Left eye: No nystagmus.      Pupils: Pupils are equal, round, and reactive to light.   Cardiovascular:      Rate and Rhythm: Normal rate and regular rhythm.      Heart sounds: Normal heart sounds. No murmur heard.  Pulmonary:      Effort: Pulmonary effort is normal. No respiratory distress.      Breath sounds: Normal breath sounds. No stridor, decreased air movement or transmitted upper airway sounds. No decreased breath sounds, wheezing, rhonchi or rales.   Chest:      Chest wall: No tenderness.   Abdominal:      General: Bowel sounds are normal. There is no distension.      Palpations: Abdomen is soft.      Tenderness: There is no abdominal tenderness. There is no right CVA tenderness, left CVA tenderness, guarding or rebound.   Musculoskeletal:         General: No swelling, tenderness, deformity or signs of

## 2025-07-10 NOTE — PROGRESS NOTES
Spiritual Health History and Assessment/Progress Note  [facility]    Initial Encounter,  ,  ,      Name: Sabrina Bajwa MRN: [id number]    Age: 70 y.o.     Sex: female   Language: English   Hindu: None   <principal problem not specified>     Date: 7/10/2025                           Spiritual Assessment began in [facility] EMERGENCY DEPARTMENT        Referral/Consult From: Rounding   Encounter Overview/Reason: Initial Encounter  Service Provided For: Patient, Friend    Nell, Belief, Meaning:   Patient unable to assess at this time  Family/Friends identify as spiritual      Importance and Influence:  Patient unable to assess at this time  Family/Friends have spiritual/personal beliefs that influence decisions regarding the patient's health    Community:  Patient feels well-supported. Support system includes: Extended family  Family/Friends Other: Unknown    Assessment and Plan of Care:     Patient Interventions include: Other: Prayer support provided.  Family/Friends Interventions include: Explored spiritual coping/struggle/distress and Affirmed coping skills/support systems    Patient Plan of Care: Spiritual Care available upon further referral  Family/Friends Plan of Care: Spiritual Care available upon further referral    Electronically signed by NICOLE Blackman on 7/10/2025 at 2:14 PM

## 2025-07-10 NOTE — ED NOTES
Spoke with pts Sister, Heidy Martinez about contacting her, her daughter, and godson with any updates.    (Sister) Heidy Martinez: [phone]  (Niece) Kiki: [phone]  (Godson) Palmer Martinez: [phone]    I spoke with the pt asking if she gives permission for a release of information, she stated yes she would like them to be updated.

## 2025-07-11 LAB
25(OH)D3 SERPL-MCNC: 25.2 NG/ML (ref 30–100)
ALBUMIN SERPL-MCNC: 3.5 G/DL (ref 3.5–5.2)
ALP SERPL-CCNC: 128 U/L (ref 35–104)
ALT SERPL-CCNC: 28 U/L (ref 0–35)
ANION GAP SERPL CALCULATED.3IONS-SCNC: 15 MMOL/L (ref 7–16)
AST SERPL-CCNC: 31 U/L (ref 0–35)
BASOPHILS # BLD: 0.08 K/UL (ref 0–0.2)
BASOPHILS NFR BLD: 2 % (ref 0–2)
BILIRUB SERPL-MCNC: 0.6 MG/DL (ref 0–1.2)
BUN SERPL-MCNC: 51 MG/DL (ref 8–23)
CALCIUM SERPL-MCNC: 8.1 MG/DL (ref 8.8–10.2)
CHLORIDE SERPL-SCNC: 104 MMOL/L (ref 98–107)
CHOLEST SERPL-MCNC: 175 MG/DL
CHP ED QC CHECK: NORMAL
CO2 SERPL-SCNC: 20 MMOL/L (ref 22–29)
CREAT SERPL-MCNC: 3 MG/DL (ref 0.5–1)
EKG ATRIAL RATE: 71 BPM
EKG ATRIAL RATE: 72 BPM
EKG P AXIS: 58 DEGREES
EKG P AXIS: 62 DEGREES
EKG P-R INTERVAL: 126 MS
EKG P-R INTERVAL: 128 MS
EKG Q-T INTERVAL: 476 MS
EKG Q-T INTERVAL: 482 MS
EKG QRS DURATION: 144 MS
EKG QRS DURATION: 146 MS
EKG QTC CALCULATION (BAZETT): 521 MS
EKG QTC CALCULATION (BAZETT): 523 MS
EKG R AXIS: -88 DEGREES
EKG R AXIS: 265 DEGREES
EKG T AXIS: 89 DEGREES
EKG T AXIS: 98 DEGREES
EKG VENTRICULAR RATE: 71 BPM
EKG VENTRICULAR RATE: 72 BPM
EOSINOPHIL # BLD: 0.19 K/UL (ref 0.05–0.5)
EOSINOPHILS RELATIVE PERCENT: 5 % (ref 0–6)
ERYTHROCYTE [DISTWIDTH] IN BLOOD BY AUTOMATED COUNT: 15.5 % (ref 11.5–15)
FOLATE SERPL-MCNC: 22.3 NG/ML (ref 4.6–34.8)
GFR, ESTIMATED: 16 ML/MIN/1.73M2
GLUCOSE BLD-MCNC: 134 MG/DL
GLUCOSE BLD-MCNC: 134 MG/DL (ref 74–99)
GLUCOSE BLD-MCNC: 157 MG/DL (ref 74–99)
GLUCOSE BLD-MCNC: 226 MG/DL (ref 74–99)
GLUCOSE SERPL-MCNC: 167 MG/DL (ref 74–99)
HBA1C MFR BLD: 7.2 % (ref 4–5.6)
HCT VFR BLD AUTO: 36.6 % (ref 34–48)
HDLC SERPL-MCNC: 79 MG/DL
HGB BLD-MCNC: 11.6 G/DL (ref 11.5–15.5)
IMM GRANULOCYTES # BLD AUTO: <0.03 K/UL (ref 0–0.58)
IMM GRANULOCYTES NFR BLD: 0 % (ref 0–5)
IRON SATN MFR SERPL: 20 % (ref 15–50)
IRON SERPL-MCNC: 51 UG/DL (ref 37–145)
LDLC SERPL CALC-MCNC: 81 MG/DL
LYMPHOCYTES NFR BLD: 0.77 K/UL (ref 1.5–4)
LYMPHOCYTES RELATIVE PERCENT: 21 % (ref 20–42)
MAGNESIUM SERPL-MCNC: 1.4 MG/DL (ref 1.6–2.4)
MCH RBC QN AUTO: 27.2 PG (ref 26–35)
MCHC RBC AUTO-ENTMCNC: 31.7 G/DL (ref 32–34.5)
MCV RBC AUTO: 85.7 FL (ref 80–99.9)
MONOCYTES NFR BLD: 0.34 K/UL (ref 0.1–0.95)
MONOCYTES NFR BLD: 9 % (ref 2–12)
NEUTROPHILS NFR BLD: 62 % (ref 43–80)
NEUTS SEG NFR BLD: 2.24 K/UL (ref 1.8–7.3)
PHOSPHATE SERPL-MCNC: 3.6 MG/DL (ref 2.5–4.5)
PLATELET # BLD AUTO: 310 K/UL (ref 130–450)
PMV BLD AUTO: 11.6 FL (ref 7–12)
POTASSIUM SERPL-SCNC: 3.7 MMOL/L (ref 3.5–5.1)
PROT SERPL-MCNC: 6.4 G/DL (ref 6.4–8.3)
RBC # BLD AUTO: 4.27 M/UL (ref 3.5–5.5)
SODIUM SERPL-SCNC: 139 MMOL/L (ref 136–145)
TIBC SERPL-MCNC: 247 UG/DL (ref 250–450)
TRIGL SERPL-MCNC: 77 MG/DL
TROPONIN I SERPL HS-MCNC: 93 NG/L (ref 0–14)
VIT B12 SERPL-MCNC: 681 PG/ML (ref 232–1245)
VLDLC SERPL CALC-MCNC: 15 MG/DL
WBC OTHER # BLD: 3.6 K/UL (ref 4.5–11.5)

## 2025-07-11 PROCEDURE — 83540 ASSAY OF IRON: CPT

## 2025-07-11 PROCEDURE — 93005 ELECTROCARDIOGRAM TRACING: CPT

## 2025-07-11 PROCEDURE — 80061 LIPID PANEL: CPT

## 2025-07-11 PROCEDURE — 6370000000 HC RX 637 (ALT 250 FOR IP)

## 2025-07-11 PROCEDURE — 93010 ELECTROCARDIOGRAM REPORT: CPT | Performed by: INTERNAL MEDICINE

## 2025-07-11 PROCEDURE — 83735 ASSAY OF MAGNESIUM: CPT

## 2025-07-11 PROCEDURE — 84484 ASSAY OF TROPONIN QUANT: CPT

## 2025-07-11 PROCEDURE — 82306 VITAMIN D 25 HYDROXY: CPT

## 2025-07-11 PROCEDURE — 82962 GLUCOSE BLOOD TEST: CPT

## 2025-07-11 PROCEDURE — 2060000000 HC ICU INTERMEDIATE R&B

## 2025-07-11 PROCEDURE — 83550 IRON BINDING TEST: CPT

## 2025-07-11 PROCEDURE — 82746 ASSAY OF FOLIC ACID SERUM: CPT

## 2025-07-11 PROCEDURE — 83036 HEMOGLOBIN GLYCOSYLATED A1C: CPT

## 2025-07-11 PROCEDURE — 85025 COMPLETE CBC W/AUTO DIFF WBC: CPT

## 2025-07-11 PROCEDURE — 80053 COMPREHEN METABOLIC PANEL: CPT

## 2025-07-11 PROCEDURE — 84100 ASSAY OF PHOSPHORUS: CPT

## 2025-07-11 PROCEDURE — 82607 VITAMIN B-12: CPT

## 2025-07-11 RX ORDER — HYDRALAZINE HYDROCHLORIDE 25 MG/1
25 TABLET, FILM COATED ORAL 3 TIMES DAILY
Status: DISCONTINUED | OUTPATIENT
Start: 2025-07-11 | End: 2025-07-21 | Stop reason: HOSPADM

## 2025-07-11 RX ORDER — SACUBITRIL AND VALSARTAN 24; 26 MG/1; MG/1
1 TABLET, FILM COATED ORAL 2 TIMES DAILY
Status: DISCONTINUED | OUTPATIENT
Start: 2025-07-11 | End: 2025-07-12

## 2025-07-11 RX ORDER — ATORVASTATIN CALCIUM 40 MG/1
40 TABLET, FILM COATED ORAL DAILY
Status: DISCONTINUED | OUTPATIENT
Start: 2025-07-11 | End: 2025-07-21 | Stop reason: HOSPADM

## 2025-07-11 RX ORDER — HYDRALAZINE HYDROCHLORIDE 20 MG/ML
5 INJECTION INTRAMUSCULAR; INTRAVENOUS EVERY 4 HOURS PRN
Status: DISCONTINUED | OUTPATIENT
Start: 2025-07-11 | End: 2025-07-21 | Stop reason: HOSPADM

## 2025-07-11 RX ORDER — LABETALOL HYDROCHLORIDE 5 MG/ML
5 INJECTION, SOLUTION INTRAVENOUS EVERY 4 HOURS PRN
Status: DISCONTINUED | OUTPATIENT
Start: 2025-07-11 | End: 2025-07-21 | Stop reason: HOSPADM

## 2025-07-11 RX ORDER — METHOCARBAMOL 500 MG/1
750 TABLET, FILM COATED ORAL 4 TIMES DAILY PRN
Status: DISCONTINUED | OUTPATIENT
Start: 2025-07-11 | End: 2025-07-21 | Stop reason: HOSPADM

## 2025-07-11 RX ORDER — FUROSEMIDE 10 MG/ML
20 INJECTION INTRAMUSCULAR; INTRAVENOUS ONCE
Status: DISCONTINUED | OUTPATIENT
Start: 2025-07-11 | End: 2025-07-12

## 2025-07-11 RX ADMIN — SACUBITRIL AND VALSARTAN 1 TABLET: 24; 26 TABLET, FILM COATED ORAL at 08:09

## 2025-07-11 RX ADMIN — METOPROLOL SUCCINATE 75 MG: 25 TABLET, EXTENDED RELEASE ORAL at 04:17

## 2025-07-11 RX ADMIN — SACUBITRIL AND VALSARTAN 1 TABLET: 24; 26 TABLET, FILM COATED ORAL at 04:17

## 2025-07-11 RX ADMIN — LEVOTHYROXINE SODIUM 50 MCG: 0.05 TABLET ORAL at 08:09

## 2025-07-11 RX ADMIN — CLOPIDOGREL BISULFATE 75 MG: 75 TABLET, FILM COATED ORAL at 08:08

## 2025-07-11 RX ADMIN — PANTOPRAZOLE SODIUM 40 MG: 40 TABLET, DELAYED RELEASE ORAL at 08:08

## 2025-07-11 RX ADMIN — INSULIN LISPRO 2 UNITS: 100 INJECTION, SOLUTION INTRAVENOUS; SUBCUTANEOUS at 21:57

## 2025-07-11 RX ADMIN — METOPROLOL SUCCINATE 75 MG: 25 TABLET, EXTENDED RELEASE ORAL at 21:57

## 2025-07-11 RX ADMIN — HYDRALAZINE HYDROCHLORIDE 25 MG: 25 TABLET ORAL at 08:08

## 2025-07-11 RX ADMIN — HYDRALAZINE HYDROCHLORIDE 25 MG: 25 TABLET ORAL at 21:57

## 2025-07-11 RX ADMIN — ASPIRIN 81 MG: 81 TABLET, CHEWABLE ORAL at 08:08

## 2025-07-11 RX ADMIN — ATORVASTATIN CALCIUM 40 MG: 40 TABLET, FILM COATED ORAL at 08:08

## 2025-07-11 RX ADMIN — SACUBITRIL AND VALSARTAN 1 TABLET: 24; 26 TABLET, FILM COATED ORAL at 21:57

## 2025-07-11 RX ADMIN — METOPROLOL SUCCINATE 75 MG: 25 TABLET, EXTENDED RELEASE ORAL at 08:08

## 2025-07-11 NOTE — ED PROVIDER NOTES
Patient signed out to me pending imaging from Dr. Mcallister.  Patient had CTAs of the head and neck which were inconclusive due to poor contrast timing.  Initial plan was to perform MRIs to evaluate for stroke.  Patient has pacemaker which is not compatible with MRI.  Repeat CT is showed a possible TALON occlusion.  Patient had clinically improving exam.  Patient was able to use her left legs and was able to mentate without difficulty.  Patient had minor weakness of the right lower extremity.  Neurointervention was consulted.  Recommendation is for patient to be placed on aspirin and Plavix.  Patient was loaded with Plavix while in the emergency department.  Patient does not warrant transfer as we are a stroke center.  This was relayed to me by Dr. Boudreaux who states patient should stay at [facility].  Patient is to be allowed permissive hypertension.  Recommendations are for patient to be evaluated for risk factors including lipid panel, A1c, and evaluate for other risk factors.  Patient was admitted to Lisandra Do, clinical course related.  Patient will be admitted at [facility] for evaluation.  Patient improvement of her symptoms of the right lower extremity weakness.  Patient had equal strength in the upper extremities.  Patient had full strength in the left lower extremity.  Patient sensation intact.  Family was updated at bedside.     Phillip Mendieta, DO  07/11/25 0418

## 2025-07-11 NOTE — CONSULTS
Nephrology Consult Note  Patient's Name: Sabrina Bajwa  9:42 AM  7/11/2025    Nephrologist: Mele Marcano MD    Reason for Consult:  LYDIA on CKD  PCP: Jesse Rojas DO  Requesting Physician: Vishal Jarrett DO    Chief Complaint:  AMS    History Obtained From:  pt, chart    History of Present Illness:    Sabrina Bajwa is a 70 y.o. female with past medical history of HTN, CHF/cardiomyopathy with AICD in place (follows with Cardiology at [facility] and Dr. Fabian Mayorga locally), T2DM, HLD. She also had a history of pre-eclampsia with one prior pregnancy. She has followed with me longitudinally in the office since she was referred for a new patient evaluation of chronic kidney disease and seen initially in September 2024.    Prior to establishing with me she was hospitalized in [place] in February 2024 when she suffered acute kidney injury when her creatinine peaked at 3.67 mg/dL.  She was found to be COVID-positive at that time and had a CHF exacerbation.  Her Jardiance and Entresto were held at that time.  She follows with cardiology at the [facility].  Her creatinine as of her last appointment was 3.25 mg/dL with an EGFR of 15 mL/min.  She also has had more proteinuria, most recently nephrotic range 3.72 g.    Patient presented to the [facility] emergency department last evening for altered mental status.  She apparently had an episode at work where she was talking to her coworkers then slumped in her chair, felt weak and stopped talking.  She was following minimal commands in the ED initially.  She was evaluated by telestroke service, her NIH stroke scale score initially was 14, and neurology consult was requested, she was given full dose aspirin, MRI of brain ordered.  Encephalopathy was felt to be metabolic and hence she was not given any tPA.  She underwent CTA of the head and neck.  Repeat CT scan showed possible TALON occlusion.  Her exam was clinically improving per review

## 2025-07-11 NOTE — PROGRESS NOTES
Internal Medicine Consult Note    GERARDO=[facility]    Vishal Jarrett D.O., ANUPAM.                    Cecil Laureano D.O., SANDRA Merida D.O.     Mahamed Kebede, MSN, APRN-CNP  Yusuf Grijalva, MSN, APRN-CNP  Lisandra Do, MSN. APRN-NP-C  Linda Kim MSN, APRN, ACNP-AG     Primary Care Physician: Jesse Rojas DO   Admitting Physician:  Vishal Jarrett DO  Admission date and time: 7/10/2025 12:36 PM    Room:  [place] [address]  Admitting diagnosis: Cerebral artery occlusion [I66.9]    Patient Name: Sabrina Bajwa  MRN: [id number]    Date of Service: 7/11/2025     Subjective:  Sabrina is a 70 y.o. female who was seen and examined today,7/11/2025, at the bedside.  Patient seen and examined in the emergency department.  She is alert and oriented.  Slow to respond verbally with word searching.  Patient does better with simple answers to questions.  She does have tongue deviation to the right with notable right sided weakness.  Patient is unable to have MRI of the brain due to pacemaker.    No family present during my examination.    Review of System:   Constitutional:   Denies fever or chills, weight loss or gain, positive fatigue  HEENT:   Denies ear pain, sore throat, sinus or eye problems.  Cardiovascular:   Denies any chest pain, irregular heartbeats, or palpitations.   Respiratory:   Denies shortness of breath, coughing, sputum production, hemoptysis, or wheezing.  Gastrointestinal:   Denies nausea, vomiting, diarrhea, or constipation.  Denies any abdominal pain.  Genitourinary:    Denies any urgency, frequency, hematuria. Voiding  without difficulty.  Extremities:   Denies lower extremity swelling, edema or cyanosis.   Neurology:    Denies any headache or focal neurological deficits, reports right-sided weakness and word searching   Psch:   Denies being anxious or depressed.  Musculoskeletal:    Denies  myalgias, joint complaints or back pain.  and individuals accompanying the patient.    The patient was seen, examined and then discussed with Dr. Jarrett.      JADE Ibarra - CNP  7/11/2025  4:24 PM        I saw and evaluated the patient. I agree with the findings and the plan of care as documented in Yusuf HANSEN-CNP note.    Vishal Jarrett DO, FACOI  5:57 PM  7/11/2025

## 2025-07-11 NOTE — ED NOTES
Pt requested using the restroom. This nurse assisted the pt to the wheelchair using stand and pivot technique. This nurse then assisted the pt onto the toilet using the same technique. The pt requested some privacy while using the restroom. This nurse handed the pt the call light that was attached to the wall with a string. This nurse educated the pt on the importance of using the call light, and to not try and get up by herself. The pt verbally agreed and understood. After 5 minutes, this nurse checked on the pt and found the pt on the ground. When asking the pt what happened, she stated that she attempted to get up by herself by using the grab bar in the restroom. This nurse then reeducated the importance of using the call light. This nurse assisted the pt back into the wheelchair. Vitals were checked. The pt stated that she did not hit her head, and was able to tell the nurse the date, her , the year, and where she was. Dr. Mendeita was notified.

## 2025-07-11 NOTE — H&P
any urgency, frequency, hematuria.  Voiding without difficulty.    Musculoskeletal:   Endorses right leg giving out on her while in the bathroom in the emergency department.    Neurology:    Denies any headache or focal neurological deficits.  Endorses transient altered mental status where she was unable to speak or move/sit up with the leg history of moving; she relates that she was conscious during the event but could not speak.  She states the symptoms have resolved on their own after a few hours.    Derm:    Denies any rashes, ulcers, or excoriations.  Denies bruising.      Extremities:   Denies any lower extremity swelling or edema.      PHYSICAL EXAM:  VITALS:  Vitals:    07/10/25 1845   BP: (!) 182/115   Pulse:    Resp:    Temp:    SpO2: 100%         CONSTITUTIONAL:    Awake, alert, cooperative, no apparent distress, and appears stated age    EYES:    PERRL, EOMI, sclera clear, conjunctiva normal    ENT:    Normocephalic, atraumatic, sinuses nontender on palpation. External ears without lesions. Oral pharynx with moist mucus membranes.  Tonsils without erythema or exudates.    NECK:    Supple, symmetrical, trachea midline, no adenopathy, thyroid symmetric, not enlarged and no tenderness, skin normal, no bruits, no JVD    HEMATOLOGIC/LYMPHATICS:    No cervical lymphadenopathy and no supraclavicular lymphadenopathy    LUNGS:    Symmetric. No increased work of breathing, good air exchange, clear to auscultation bilaterally, no wheezes, rhonchi, or rales, on room air    CARDIOVASCULAR:    Normal apical impulse, regular rate and rhythm, normal S1 and S2, no S3 or S4, and no murmur noted    ABDOMEN:    Normal bowel sounds, soft, non-distended, non-tender, no masses palpated, no hepatosplenomegaly, no rebound or guarding elicited on palpation     MUSCULOSKELETAL:    There is no redness, warmth, or swelling of the joints.  Full range of motion noted.  Motor strength is 3 out of 5 all extremities bilaterally.  Tone is  normal.    NEUROLOGIC:    Awake, alert, oriented to name, place and time.  Cranial nerves II-XII are grossly intact.  Motor is 3 out of 5 bilaterally.      SKIN:    No bruising or bleeding.  No redness, warmth, or swelling    EXTREMITIES:    Peripheral pulses present.  No cyanosis, or swelling.  +1 edema bilateral lower extremities.  Cool to the touch.  Mood    LABS::  Lab Results   Component Value Date    WBC 4.2 (L) 07/10/2025    HGB 11.7 07/10/2025    HCT 36.8 07/10/2025     08/30/2024     07/10/2025    K 4.1 07/10/2025     07/10/2025    CREATININE 3.1 (H) 07/10/2025    BUN 54 (H) 07/10/2025    CO2 20 (L) 07/10/2025    GLUCOSE 184 (H) 07/10/2025    ALT 26 07/10/2025    AST 26 07/10/2025    INR 1.1 07/10/2025    APTT 23.1 (L) 07/10/2025     Lab Results   Component Value Date    INR 1.1 07/10/2025    PROTIME 12.2 07/10/2025      Lab Results   Component Value Date    TSH 4.80 (H) 07/10/2025     Lab Results   Component Value Date    TRIG 71 03/03/2016     Lab Results   Component Value Date    HDL 75 03/03/2016     No components found for: \"LDLCALC\"  Lab Results   Component Value Date    LABA1C 9.5 02/12/2016       IMAGING:  XR WRIST LEFT (MIN 3 VIEWS)  Result Date: 7/10/2025  EXAMINATION: XRAY VIEWS OF THE LEFT WRIST 7/10/2025 7:35 pm COMPARISON: None. HISTORY: ORDERING SYSTEM PROVIDED HISTORY: fall TECHNOLOGIST PROVIDED HISTORY: Reason for exam:->fall FINDINGS: There is no evidence of acute fracture.  There is normal alignment.  No acute joint abnormality.  No focal osseous lesion.  Soft tissue edema dorsal to the wrist..  Degenerative changes carpal/metacarpal joint space of the thumb.     No acute osseous abnormality. Soft tissue edema dorsal to the wrist.     CTA HEAD W CONTRAST  Result Date: 7/10/2025  EXAMINATION: CTA OF THE HEAD WITH CONTRAST; CTA OF THE NECK 7/10/2025 3:47 pm: TECHNIQUE: CTA of the head/brain was performed with the administration of intravenous contrast. Multiplanar

## 2025-07-11 NOTE — CONSULTS
Consult Note            Date:7/11/2025        Patient Name:Sabrina Bajwa     YOB: 1955     Age:70 y.o.    Consults    Chief Complaint     Chief Complaint   Patient presents with    Altered Mental Status     LKW 30 minutes PTA. Patient nonverbal during triage. Intermittently able to follow commands. R arm weakness. Patients employer at bedside. States patient was \"slumped over\" in her chair. Patient was not able to verbalize anything when he asked why she was doing that.          History Obtained From   patient    History of Present Illness     Patient is 70-year-old lady who was apparently diagnosed with severe nonischemic cardiomyopathy in the past.  The etiology of her cardiomyopathy is not completely clear to me.  Patient thought she had coronary angiogram done before her ICD insertion showing patent coronary vessels although I do not have any record of that    She underwent biventricular ICD about 5 years ago.    She had echocardiogram done in the hospital in [place] last year showing ejection fraction around 25 to 30% with mild to moderate mitral regurgitation.    Patient presented to the hospital at this time with what appears to be altered mental status.  She was at work behind her desk when she started to feel not herself.  She could not talk clearly.  She found herself unable to get up and walk.  No complete loss of consciousness.    She was brought to the emergency room.    She was then admitted.  Cardiac consult was requested    I came to see her while she was still in the emergency room.  She was alert and awake.  She was able to answer questions although it was taking some time for her to come with the right answer.  She denied chest pain or shortness of breath      Past Medical History     Past Medical History:   Diagnosis Date    Arthritis     Asthma     Diabetes mellitus (HCC)     GERD (gastroesophageal reflux disease)     Hyperlipidemia     Hypertension     Immune deficiency  MD Olivia   NONFORMULARY Supplement for hot flashes   cream    Olivia Turcios MD   aspirin 81 MG EC tablet Take 1 tablet by mouth daily    Olivia Turcios MD   Sacubitril-Valsartan (ENTRESTO PO) Take by mouth 2 times daily    Olivia Turcios MD   bumetanide (BUMEX) 1 MG tablet Take 5 tablets by mouth as needed    Olivia Turcios MD   spironolactone (ALDACTONE) 25 MG tablet Take 25 mg by mouth daily  Patient not taking: Reported on 8/20/2024    Olivia Turcios MD   metFORMIN ER (GLUCOPHAGE-XR) 500 MG XR tablet Take 1 tablet by mouth 2 times daily   Patient not taking: Reported on 8/20/2024 1/14/16   Olivia Turcios MD   simvastatin (ZOCOR) 40 MG tablet Take 1 tablet by mouth nightly 11/6/15 4/26/22  Kelle Ureña MD        atorvastatin (LIPITOR) tablet 40 mg, Daily  hydrALAZINE (APRESOLINE) tablet 25 mg, TID  methocarbamol (ROBAXIN) tablet 750 mg, 4x Daily PRN  metoprolol succinate (TOPROL XL) extended release tablet 75 mg, BID  sacubitril-valsartan (ENTRESTO) 24-26 MG per tablet 1 tablet, BID  hydrALAZINE (APRESOLINE) injection 5 mg, Q4H PRN  labetalol (NORMODYNE;TRANDATE) injection 5 mg, Q4H PRN  sulfur hexafluoride microspheres (LUMASON) 60.7-25 MG injection 2 mL, ONCE PRN  pantoprazole (PROTONIX) tablet 40 mg, QAM AC  acetaminophen (TYLENOL) tablet 650 mg, Q6H PRN  prochlorperazine (COMPAZINE) injection 10 mg, Q6H PRN  melatonin disintegrating tablet 5 mg, Nightly PRN  aspirin chewable tablet 81 mg, Daily  clopidogrel (PLAVIX) tablet 75 mg, Daily  insulin lispro (HUMALOG,ADMELOG) injection vial 0-8 Units, 4x Daily AC & HS  glucose chewable tablet 16 g, PRN  dextrose bolus 10% 125 mL, PRN   Or  dextrose bolus 10% 250 mL, PRN  glucagon injection 1 mg, PRN  dextrose 10 % infusion, Continuous PRN  levothyroxine (SYNTHROID) tablet 50 mcg, Daily        Allergies   Latex and Penicillins    Social History     Social History       Tobacco History       Smoking Status  Never

## 2025-07-12 ENCOUNTER — APPOINTMENT (OUTPATIENT)
Age: 70
DRG: 064 | End: 2025-07-12
Payer: COMMERCIAL

## 2025-07-12 ENCOUNTER — APPOINTMENT (OUTPATIENT)
Dept: CT IMAGING | Age: 70
DRG: 064 | End: 2025-07-12
Payer: COMMERCIAL

## 2025-07-12 PROBLEM — R29.898 RIGHT ARM WEAKNESS: Status: ACTIVE | Noted: 2025-07-12

## 2025-07-12 PROBLEM — I66.19: Status: ACTIVE | Noted: 2025-07-12

## 2025-07-12 PROBLEM — I63.9 ACUTE CVA (CEREBROVASCULAR ACCIDENT) (HCC): Status: ACTIVE | Noted: 2025-07-12

## 2025-07-12 PROBLEM — G93.40 ACUTE ENCEPHALOPATHY: Status: ACTIVE | Noted: 2025-07-12

## 2025-07-12 LAB
ALBUMIN SERPL-MCNC: 3.4 G/DL (ref 3.5–5.2)
ALP SERPL-CCNC: 128 U/L (ref 35–104)
ALT SERPL-CCNC: 24 U/L (ref 0–35)
ANION GAP SERPL CALCULATED.3IONS-SCNC: 17 MMOL/L (ref 7–16)
AST SERPL-CCNC: 25 U/L (ref 0–35)
BASOPHILS # BLD: 0.03 K/UL (ref 0–0.2)
BASOPHILS NFR BLD: 1 % (ref 0–2)
BILIRUB SERPL-MCNC: 0.7 MG/DL (ref 0–1.2)
BUN SERPL-MCNC: 61 MG/DL (ref 8–23)
CALCIUM SERPL-MCNC: 8.4 MG/DL (ref 8.8–10.2)
CHLORIDE SERPL-SCNC: 103 MMOL/L (ref 98–107)
CO2 SERPL-SCNC: 17 MMOL/L (ref 22–29)
CREAT SERPL-MCNC: 4.5 MG/DL (ref 0.5–1)
EOSINOPHIL # BLD: 0.02 K/UL (ref 0.05–0.5)
EOSINOPHILS RELATIVE PERCENT: 1 % (ref 0–6)
ERYTHROCYTE [DISTWIDTH] IN BLOOD BY AUTOMATED COUNT: 15.6 % (ref 11.5–15)
GFR, ESTIMATED: 10 ML/MIN/1.73M2
GLUCOSE BLD-MCNC: 154 MG/DL (ref 74–99)
GLUCOSE BLD-MCNC: 176 MG/DL (ref 74–99)
GLUCOSE BLD-MCNC: 176 MG/DL (ref 74–99)
GLUCOSE BLD-MCNC: 219 MG/DL (ref 74–99)
GLUCOSE SERPL-MCNC: 165 MG/DL (ref 74–99)
HCT VFR BLD AUTO: 36.6 % (ref 34–48)
HGB BLD-MCNC: 11.8 G/DL (ref 11.5–15.5)
IMM GRANULOCYTES # BLD AUTO: <0.03 K/UL (ref 0–0.58)
IMM GRANULOCYTES NFR BLD: 1 % (ref 0–5)
LYMPHOCYTES NFR BLD: 0.46 K/UL (ref 1.5–4)
LYMPHOCYTES RELATIVE PERCENT: 10 % (ref 20–42)
MAGNESIUM SERPL-MCNC: 1.5 MG/DL (ref 1.6–2.4)
MCH RBC QN AUTO: 27.3 PG (ref 26–35)
MCHC RBC AUTO-ENTMCNC: 32.2 G/DL (ref 32–34.5)
MCV RBC AUTO: 84.7 FL (ref 80–99.9)
MONOCYTES NFR BLD: 0.29 K/UL (ref 0.1–0.95)
MONOCYTES NFR BLD: 7 % (ref 2–12)
NEUTROPHILS NFR BLD: 81 % (ref 43–80)
NEUTS SEG NFR BLD: 3.61 K/UL (ref 1.8–7.3)
PHOSPHATE SERPL-MCNC: 4.7 MG/DL (ref 2.5–4.5)
PLATELET # BLD AUTO: 295 K/UL (ref 130–450)
PMV BLD AUTO: 11.6 FL (ref 7–12)
POTASSIUM SERPL-SCNC: 4.6 MMOL/L (ref 3.5–5.1)
PROT SERPL-MCNC: 6.6 G/DL (ref 6.4–8.3)
RBC # BLD AUTO: 4.32 M/UL (ref 3.5–5.5)
RBC # BLD: ABNORMAL 10*6/UL
RBC # BLD: ABNORMAL 10*6/UL
SODIUM SERPL-SCNC: 138 MMOL/L (ref 136–145)
WBC OTHER # BLD: 4.4 K/UL (ref 4.5–11.5)

## 2025-07-12 PROCEDURE — 70450 CT HEAD/BRAIN W/O DYE: CPT

## 2025-07-12 PROCEDURE — 97530 THERAPEUTIC ACTIVITIES: CPT

## 2025-07-12 PROCEDURE — 36415 COLL VENOUS BLD VENIPUNCTURE: CPT

## 2025-07-12 PROCEDURE — 51798 US URINE CAPACITY MEASURE: CPT

## 2025-07-12 PROCEDURE — 97161 PT EVAL LOW COMPLEX 20 MIN: CPT

## 2025-07-12 PROCEDURE — 84100 ASSAY OF PHOSPHORUS: CPT

## 2025-07-12 PROCEDURE — 6370000000 HC RX 637 (ALT 250 FOR IP)

## 2025-07-12 PROCEDURE — 82962 GLUCOSE BLOOD TEST: CPT

## 2025-07-12 PROCEDURE — 93306 TTE W/DOPPLER COMPLETE: CPT

## 2025-07-12 PROCEDURE — 2580000003 HC RX 258: Performed by: INTERNAL MEDICINE

## 2025-07-12 PROCEDURE — 80053 COMPREHEN METABOLIC PANEL: CPT

## 2025-07-12 PROCEDURE — 83735 ASSAY OF MAGNESIUM: CPT

## 2025-07-12 PROCEDURE — 85025 COMPLETE CBC W/AUTO DIFF WBC: CPT

## 2025-07-12 PROCEDURE — 2060000000 HC ICU INTERMEDIATE R&B

## 2025-07-12 PROCEDURE — 6360000002 HC RX W HCPCS: Performed by: INTERNAL MEDICINE

## 2025-07-12 RX ORDER — SODIUM CHLORIDE 9 MG/ML
INJECTION, SOLUTION INTRAVENOUS CONTINUOUS
Status: ACTIVE | OUTPATIENT
Start: 2025-07-12 | End: 2025-07-13

## 2025-07-12 RX ORDER — MAGNESIUM SULFATE IN WATER 40 MG/ML
2000 INJECTION, SOLUTION INTRAVENOUS ONCE
Status: COMPLETED | OUTPATIENT
Start: 2025-07-12 | End: 2025-07-12

## 2025-07-12 RX ADMIN — PANTOPRAZOLE SODIUM 40 MG: 40 TABLET, DELAYED RELEASE ORAL at 06:16

## 2025-07-12 RX ADMIN — SODIUM CHLORIDE: 0.9 INJECTION, SOLUTION INTRAVENOUS at 12:40

## 2025-07-12 RX ADMIN — HYDRALAZINE HYDROCHLORIDE 25 MG: 25 TABLET ORAL at 08:00

## 2025-07-12 RX ADMIN — HYDRALAZINE HYDROCHLORIDE 25 MG: 25 TABLET ORAL at 14:27

## 2025-07-12 RX ADMIN — CLOPIDOGREL BISULFATE 75 MG: 75 TABLET, FILM COATED ORAL at 08:00

## 2025-07-12 RX ADMIN — METOPROLOL SUCCINATE 75 MG: 25 TABLET, EXTENDED RELEASE ORAL at 21:40

## 2025-07-12 RX ADMIN — HYDRALAZINE HYDROCHLORIDE 25 MG: 25 TABLET ORAL at 21:40

## 2025-07-12 RX ADMIN — METOPROLOL SUCCINATE 75 MG: 25 TABLET, EXTENDED RELEASE ORAL at 08:00

## 2025-07-12 RX ADMIN — MAGNESIUM SULFATE HEPTAHYDRATE 2000 MG: 40 INJECTION, SOLUTION INTRAVENOUS at 14:21

## 2025-07-12 RX ADMIN — INSULIN LISPRO 2 UNITS: 100 INJECTION, SOLUTION INTRAVENOUS; SUBCUTANEOUS at 13:26

## 2025-07-12 RX ADMIN — LEVOTHYROXINE SODIUM 50 MCG: 0.05 TABLET ORAL at 06:16

## 2025-07-12 RX ADMIN — ASPIRIN 81 MG: 81 TABLET, CHEWABLE ORAL at 08:00

## 2025-07-12 RX ADMIN — SACUBITRIL AND VALSARTAN 1 TABLET: 24; 26 TABLET, FILM COATED ORAL at 08:00

## 2025-07-12 RX ADMIN — ATORVASTATIN CALCIUM 40 MG: 40 TABLET, FILM COATED ORAL at 08:00

## 2025-07-12 NOTE — PROGRESS NOTES
Cardiology  Progress Note      SUBJECTIVE: Patient is following commands by squeezing arms and raising feet but she is not talking at all.  She looks anxious.  She is having eye to eye contact    Current Inpatient Medications  Current Facility-Administered Medications: atorvastatin (LIPITOR) tablet 40 mg, 40 mg, Oral, Daily  hydrALAZINE (APRESOLINE) tablet 25 mg, 25 mg, Oral, TID  methocarbamol (ROBAXIN) tablet 750 mg, 750 mg, Oral, 4x Daily PRN  metoprolol succinate (TOPROL XL) extended release tablet 75 mg, 75 mg, Oral, BID  sacubitril-valsartan (ENTRESTO) 24-26 MG per tablet 1 tablet, 1 tablet, Oral, BID  hydrALAZINE (APRESOLINE) injection 5 mg, 5 mg, IntraVENous, Q4H PRN  labetalol (NORMODYNE;TRANDATE) injection 5 mg, 5 mg, IntraVENous, Q4H PRN  sulfur hexafluoride microspheres (LUMASON) 60.7-25 MG injection 2 mL, 2 mL, IntraVENous, ONCE PRN  furosemide (LASIX) injection 20 mg, 20 mg, IntraVENous, Once  pantoprazole (PROTONIX) tablet 40 mg, 40 mg, Oral, QAM AC  acetaminophen (TYLENOL) tablet 650 mg, 650 mg, Oral, Q6H PRN  prochlorperazine (COMPAZINE) injection 10 mg, 10 mg, IntraVENous, Q6H PRN  melatonin disintegrating tablet 5 mg, 5 mg, Oral, Nightly PRN  aspirin chewable tablet 81 mg, 81 mg, Oral, Daily  clopidogrel (PLAVIX) tablet 75 mg, 75 mg, Oral, Daily  insulin lispro (HUMALOG,ADMELOG) injection vial 0-8 Units, 0-8 Units, SubCUTAneous, 4x Daily AC & HS  glucose chewable tablet 16 g, 4 tablet, Oral, PRN  dextrose bolus 10% 125 mL, 125 mL, IntraVENous, PRN **OR** dextrose bolus 10% 250 mL, 250 mL, IntraVENous, PRN  glucagon injection 1 mg, 1 mg, SubCUTAneous, PRN  dextrose 10 % infusion, , IntraVENous, Continuous PRN  levothyroxine (SYNTHROID) tablet 50 mcg, 50 mcg, Oral, Daily      Physical  VITALS:  BP (!) 142/95   Pulse 72   Temp 97.9 °F (36.6 °C)   Resp 18   Wt 83.9 kg (185 lb)   SpO2 94%   BMI 34.96 kg/m²   CURRENT TEMPERATURE:  Temp: 97.9 °F (36.6 °C)  CONSTITUTIONAL: No acute distress.  EYES:

## 2025-07-12 NOTE — PLAN OF CARE
Problem: Chronic Conditions and Co-morbidities  Goal: Patient's chronic conditions and co-morbidity symptoms are monitored and maintained or improved  7/12/2025 1032 by Matias Hernandez RN  Outcome: Progressing  Flowsheets (Taken 7/12/2025 0800)  Care Plan - Patient's Chronic Conditions and Co-Morbidity Symptoms are Monitored and Maintained or Improved: Monitor and assess patient's chronic conditions and comorbid symptoms for stability, deterioration, or improvement  7/12/2025 0144 by Jennifer Joshi RN  Outcome: Progressing     Problem: Discharge Planning  Goal: Discharge to home or other facility with appropriate resources  7/12/2025 1032 by Matias Hernandez RN  Outcome: Progressing  Flowsheets (Taken 7/12/2025 0800)  Discharge to home or other facility with appropriate resources: Identify barriers to discharge with patient and caregiver  7/12/2025 0144 by Jennifer Joshi RN  Outcome: Progressing     Problem: Safety - Adult  Goal: Free from fall injury  7/12/2025 1032 by Matias Hernandez RN  Outcome: Progressing  Flowsheets (Taken 7/12/2025 0800)  Free From Fall Injury: Instruct family/caregiver on patient safety  7/12/2025 0144 by Jennifer Joshi RN  Outcome: Progressing     Problem: Skin/Tissue Integrity  Goal: Skin integrity remains intact  Description: 1.  Monitor for areas of redness and/or skin breakdown  2.  Assess vascular access sites hourly  3.  Every 4-6 hours minimum:  Change oxygen saturation probe site  4.  Every 4-6 hours:  If on nasal continuous positive airway pressure, respiratory therapy assess nares and determine need for appliance change or resting period  7/12/2025 1032 by Matias Hernandez RN  Outcome: Progressing  Flowsheets (Taken 7/12/2025 0800)  Skin Integrity Remains Intact: Monitor for areas of redness and/or skin breakdown  7/12/2025 0144 by Jennifer Joshi RN  Outcome: Progressing     Problem: ABCDS Injury Assessment  Goal: Absence of physical injury  7/12/2025 1032

## 2025-07-12 NOTE — PROGRESS NOTES
MRI had called and spoke to this RN about unable to complete MRI due pacemaker approaching end of life.

## 2025-07-12 NOTE — PROGRESS NOTES
We are unable to scan this patient, per [employer], her device needs to be replaced before we can perform ANY MRI. MRI of the brain was ordered yesterday (7/11), and discontinued d/t her monitor needing replaced. Thank you!

## 2025-07-12 NOTE — PROGRESS NOTES
Internal Medicine Consult Note    [facility]    Vishal Jarrett D.O., VICI.                    Cecil Laureano D.O., SANDRA Merida D.O.     Mahamed Kebede, MSN, APRN-CNP  Yusuf Grijalva, MSN, APRN-CNP  Lisandra Do, MSN. APRN-NP-C  Linda Kim MSN, APRN, ACNP-AG     Primary Care Physician: Jesse Rojas DO   Admitting Physician:  Vishal Jarrett DO  Admission date and time: 7/10/2025 12:36 PM    Room:  [address] [place] [address]  Admitting diagnosis: Cerebral artery occlusion [I66.9]  Elevated troponin [R79.89]  Acute CVA (cerebrovascular accident) (HCC) [I63.9]  Altered mental status, unspecified altered mental status type [R41.82]  Chronic renal impairment, stage 3 (moderate), unspecified whether stage 3a or 3b CKD (HCC) [N18.30]    Patient Name: Sabrina Bajwa  MRN: [id number]    Date of Service: 7/12/2025     Subjective:  Sabrina is a 70 y.o. female who was seen and examined today,7/12/2025, at the bedside.  Patient resting comfortably in bed.  With her friend at the bedside.  She does not require use of any oxygen.  She is alert and oriented x 3, speech is clear.  She continues to display word searching.  She continues to have right-sided tongue deviation with increased and right extremity weakness.  Patient is unable to undergo an MRI due to her pacemaker.  No family present during my examination.    Review of System:   Constitutional:   Denies fever or chills, weight loss or gain, positive fatigue  HEENT:   Denies ear pain, sore throat, sinus or eye problems.  Cardiovascular:   Denies any chest pain, irregular heartbeats, or palpitations.   Respiratory:   Denies shortness of breath, coughing, sputum production, hemoptysis, or wheezing.  Gastrointestinal:   Denies nausea, vomiting, diarrhea, or constipation.  Denies any abdominal pain.  Genitourinary:    Denies any urgency, frequency, hematuria. Voiding  without difficulty.  Extremities:

## 2025-07-12 NOTE — PROGRESS NOTES
Physical Therapy   Initial Evaluation/Plan of Care    Room #:  0533/0533-02  Patient Name: Sabrina Bajwa  YOB: 1955  MRN: [id number]    Date of Service: 7/12/2025     Tentative placement recommendation: Acute Rehab with pt's daughter in agreement  Equipment recommendation: To be determined      Evaluating Physical Therapist: Lula Dickerson, PT #[id number]      Specific Provider Orders/Date/Referring PrPT eval and treat  Start:  07/10/25 2115,   End:  07/10/25 2115,   ONE TIME,   Standing Count:  1 Occurrences,   R       Lsiandra Do, APRN - CNPovider :     Admitting Diagnosis:   Cerebral artery occlusion [I66.9]  Elevated troponin [R79.89]  Acute CVA (cerebrovascular accident) ([facility]) [I63.9]  Altered mental status, unspecified altered mental status type [R41.82]  Chronic renal impairment, stage 3 (moderate), unspecified whether stage 3a or 3b CKD ([facility]) [N18.30]    Pt is a 70 y.o. female adm 7/11/25 with acute change in mental status and R weakness Dx with cerebral artery occlusion. No MRI due to pacemaker insertion.       Visit Diagnoses         Codes      Altered mental status, unspecified altered mental status type    -  Primary R41.82      Chronic renal impairment, stage 3 (moderate), unspecified whether stage 3a or 3b CKD ([facility])     N18.30      Elevated troponin     R79.89      Acute CVA (cerebrovascular accident) ([facility])     I63.9            Patient Active Problem List   Diagnosis    Diabetes mellitus ([facility])    Hypertension    Asthma    Hyperlipidemia    Vitamin D deficiency    Microalbuminuria due to type 2 diabetes mellitus ([facility])    Enteritis    Intractable vomiting    Dilated cbd, acquired    Cerebral artery occlusion        ASSESSMENT of Current Deficits Patient exhibits receptive & expressive aphasia with decreased strength, balance, and endurance impairing bed mobility, sitting balance, transfers, gait , and tolerance to activity Pt exhibits L visual preference and is flaccid on R UE and R LE with  side with use of bed rails    Supine to sit: Maximal assist of 1    Sit to supine: Maximal assist of 1    Scooting: Dependent assist of 1-2    Rolling: Supervision to R side, Moderate assist to L side    Supine to sit: Maximal assist of 1    Sit to supine: Maximal assist of 1    Scooting: Dependent assist of 1     Transfers Sit to stand: Not assessed for safety reasons due to heavy R lean in sitting   Sit to stand: Maximal assist of  2 if safe to attempt   Ambulation    not assessed    not assessed    Stair negotiation: ascended and descended   Not assessed     Not assessed    ROM Within functional limits    Increase range of motion 10% of affected joints    Strength BUE:  refer to OT eval  RLE:  0/5 and flaccid  LLE:  WFL  Increase strength in affected mm groups by 1/3 grade   Balance Sitting EOB:  poor with heavy R lean and Maximal assist to remain upright  Dynamic Standing:  not assessed   Sitting EOB:   poor+  Dynamic Standing: not assessed      Patient is Alert & Oriented to self and month but not place or year. verbally  She follows one step directions  inconsistently with repetition and visual/tactile cuing  Sensation:  Patient  not appropriate to assess    Edema:  no   Endurance: poor     Vitals: room air   Blood Pressure at rest 121/68 Blood Pressure during session 132/77   Heart Rate at rest 63 Heart Rate during session 66     Patient education  Patient/daughter educated on role of Physical Therapy, risks of immobility, safety and plan of care,  importance of mobility while in hospital , safety , and facilitation of R visual awareness and sensation/passive movement to R extremities     Patient response to education:   Pt verbalized understanding Pt demonstrated skill Pt requires further education in this area   No Partial Yes      Treatment:  Patient practiced and was instructed/facilitated in the following treatment:     Patient  Sat edge of bed 15 minutes with Maximal assist of 1 to increase dynamic

## 2025-07-12 NOTE — PROGRESS NOTES
Progress Note  7/12/2025 12:28 PM  Subjective:   Admit Date: 7/10/2025  PCP: Jesse Rojas,   Interval History: patient examined doing well , alert in no distress , po intake fair , no swallowing difficulty     Diet: ADULT DIET; Regular; 5 carb choices (75 gm/meal); Low Fat/Low Chol/High Fiber/MARJAN    Data:   Scheduled Meds:   atorvastatin  40 mg Oral Daily    hydrALAZINE  25 mg Oral TID    metoprolol succinate  75 mg Oral BID    furosemide  20 mg IntraVENous Once    pantoprazole  40 mg Oral QAM AC    aspirin  81 mg Oral Daily    clopidogrel  75 mg Oral Daily    insulin lispro  0-8 Units SubCUTAneous 4x Daily AC & HS    levothyroxine  50 mcg Oral Daily     Continuous Infusions:   sodium chloride      dextrose       PRN Meds:methocarbamol, hydrALAZINE, labetalol, sulfur hexafluoride microspheres, acetaminophen, prochlorperazine, melatonin, glucose, dextrose bolus **OR** dextrose bolus, glucagon (rDNA), dextrose  No intake/output data recorded.  No intake/output data recorded.  No intake or output data in the 24 hours ending 07/12/25 1228  CBC:   Recent Labs     07/10/25  1300 07/11/25  0624 07/12/25  0700   WBC 4.2* 3.6* 4.4*   HGB 11.7 11.6 11.8   PLT  --  310 295     BMP:    Recent Labs     07/10/25  1300 07/10/25  2345 07/11/25  0624 07/11/25  1616 07/12/25  0700     --  139  --  138   K 4.1  --  3.7  --  4.6     --  104  --  103   CO2 20*  --  20*  --  17*   BUN 54*  --  51*  --  61*   CREATININE 3.1*  --  3.0*  --  4.5*   GLUCOSE 184*   < > 167* 134 165*    < > = values in this interval not displayed.     Hepatic:   Recent Labs     07/10/25  1300 07/11/25  0624 07/12/25  0700   AST 26 31 25   ALT 26 28 24   BILITOT 0.7 0.6 0.7   ALKPHOS 138* 128* 128*     Troponin: No results for input(s): \"TROPONINI\" in the last 72 hours.  BNP: No results for input(s): \"BNP\" in the last 72 hours.  Lipids:   Recent Labs     07/11/25  0624   CHOL 175   HDL 79     ABGs: No results found for: \"PHART\", \"PO2ART\",

## 2025-07-13 ENCOUNTER — APPOINTMENT (OUTPATIENT)
Dept: CT IMAGING | Age: 70
DRG: 064 | End: 2025-07-13
Payer: COMMERCIAL

## 2025-07-13 ENCOUNTER — APPOINTMENT (OUTPATIENT)
Dept: ULTRASOUND IMAGING | Age: 70
DRG: 064 | End: 2025-07-13
Payer: COMMERCIAL

## 2025-07-13 LAB
ALBUMIN SERPL-MCNC: 3.2 G/DL (ref 3.5–5.2)
ALP SERPL-CCNC: 119 U/L (ref 35–104)
ALT SERPL-CCNC: 22 U/L (ref 0–35)
ANION GAP SERPL CALCULATED.3IONS-SCNC: 15 MMOL/L (ref 7–16)
ANION GAP SERPL CALCULATED.3IONS-SCNC: 15 MMOL/L (ref 7–16)
AST SERPL-CCNC: 24 U/L (ref 0–35)
BASOPHILS # BLD: 0.07 K/UL (ref 0–0.2)
BASOPHILS NFR BLD: 2 % (ref 0–2)
BILIRUB SERPL-MCNC: 0.6 MG/DL (ref 0–1.2)
BUN SERPL-MCNC: 68 MG/DL (ref 8–23)
BUN SERPL-MCNC: 70 MG/DL (ref 8–23)
CALCIUM SERPL-MCNC: 7.8 MG/DL (ref 8.8–10.2)
CALCIUM SERPL-MCNC: 7.9 MG/DL (ref 8.8–10.2)
CHLORIDE SERPL-SCNC: 101 MMOL/L (ref 98–107)
CHLORIDE SERPL-SCNC: 102 MMOL/L (ref 98–107)
CO2 SERPL-SCNC: 17 MMOL/L (ref 22–29)
CO2 SERPL-SCNC: 18 MMOL/L (ref 22–29)
CREAT SERPL-MCNC: 5.9 MG/DL (ref 0.5–1)
CREAT SERPL-MCNC: 6.3 MG/DL (ref 0.5–1)
ECHO AO ASC DIAM: 2.5 CM
ECHO AO ASCENDING AORTA INDEX: 1.37 CM/M2
ECHO AV AREA PEAK VELOCITY: 1.5 CM2
ECHO AV AREA VTI: 1.7 CM2
ECHO AV AREA/BSA PEAK VELOCITY: 0.8 CM2/M2
ECHO AV AREA/BSA VTI: 0.9 CM2/M2
ECHO AV CUSP MM: 1.8 CM
ECHO AV MEAN GRADIENT: 4 MMHG
ECHO AV MEAN VELOCITY: 0.9 M/S
ECHO AV PEAK GRADIENT: 8 MMHG
ECHO AV PEAK VELOCITY: 1.4 M/S
ECHO AV VELOCITY RATIO: 0.57
ECHO AV VTI: 27.1 CM
ECHO BSA: 1.9 M2
ECHO LA DIAMETER INDEX: 2.19 CM/M2
ECHO LA DIAMETER: 4 CM
ECHO LA VOL A-L A2C: 147 ML (ref 22–52)
ECHO LA VOL A-L A4C: 52 ML (ref 22–52)
ECHO LA VOL BP: 91 ML (ref 22–52)
ECHO LA VOL MOD A2C: 143 ML (ref 22–52)
ECHO LA VOL MOD A4C: 50 ML (ref 22–52)
ECHO LA VOL/BSA BIPLANE: 50 ML/M2 (ref 16–34)
ECHO LA VOLUME AREA LENGTH: 95 ML
ECHO LA VOLUME INDEX A-L A2C: 80 ML/M2 (ref 16–34)
ECHO LA VOLUME INDEX A-L A4C: 28 ML/M2 (ref 16–34)
ECHO LA VOLUME INDEX AREA LENGTH: 52 ML/M2 (ref 16–34)
ECHO LA VOLUME INDEX MOD A2C: 78 ML/M2 (ref 16–34)
ECHO LA VOLUME INDEX MOD A4C: 27 ML/M2 (ref 16–34)
ECHO LV DP/DT: 565.93 MMHG/S
ECHO LV EDV A2C: 132 ML
ECHO LV EDV A4C: 122 ML
ECHO LV EDV BP: 131 ML (ref 56–104)
ECHO LV EDV INDEX A4C: 67 ML/M2
ECHO LV EDV INDEX BP: 72 ML/M2
ECHO LV EDV NDEX A2C: 72 ML/M2
ECHO LV EF PHYSICIAN: 26 %
ECHO LV EJECTION FRACTION A2C: 43 %
ECHO LV EJECTION FRACTION A4C: 26 %
ECHO LV EJECTION FRACTION BIPLANE: 32 % (ref 55–100)
ECHO LV ESV A2C: 75 ML
ECHO LV ESV A4C: 91 ML
ECHO LV ESV BP: 89 ML (ref 19–49)
ECHO LV ESV INDEX A2C: 41 ML/M2
ECHO LV ESV INDEX A4C: 50 ML/M2
ECHO LV ESV INDEX BP: 49 ML/M2
ECHO LV FRACTIONAL SHORTENING: 12 % (ref 28–44)
ECHO LV INTERNAL DIMENSION DIASTOLE INDEX: 2.73 CM/M2
ECHO LV INTERNAL DIMENSION DIASTOLIC: 5 CM (ref 3.9–5.3)
ECHO LV INTERNAL DIMENSION SYSTOLIC INDEX: 2.4 CM/M2
ECHO LV INTERNAL DIMENSION SYSTOLIC: 4.4 CM
ECHO LV ISOVOLUMETRIC RELAXATION TIME (IVRT): 95.2 MS
ECHO LV IVSD: 1.4 CM (ref 0.6–0.9)
ECHO LV IVSS: 1.4 CM
ECHO LV MASS 2D: 291.4 G (ref 67–162)
ECHO LV MASS INDEX 2D: 159.2 G/M2 (ref 43–95)
ECHO LV POSTERIOR WALL DIASTOLIC: 1.4 CM (ref 0.6–0.9)
ECHO LV POSTERIOR WALL SYSTOLIC: 1.4 CM
ECHO LV RELATIVE WALL THICKNESS RATIO: 0.56
ECHO LVOT AREA: 2.8 CM2
ECHO LVOT AV VTI INDEX: 0.62
ECHO LVOT DIAM: 1.9 CM
ECHO LVOT MEAN GRADIENT: 1 MMHG
ECHO LVOT PEAK GRADIENT: 2 MMHG
ECHO LVOT PEAK VELOCITY: 0.8 M/S
ECHO LVOT STROKE VOLUME INDEX: 25.9 ML/M2
ECHO LVOT SV: 47.3 ML
ECHO LVOT VTI: 16.7 CM
ECHO MV "A" WAVE DURATION: 156 MSEC
ECHO MV A VELOCITY: 0.59 M/S
ECHO MV AREA PHT: 2.6 CM2
ECHO MV AREA VTI: 1.4 CM2
ECHO MV E DECELERATION TIME (DT): 209.6 MS
ECHO MV E VELOCITY: 0.89 M/S
ECHO MV E/A RATIO: 1.51
ECHO MV EROA PISA: 0.2 CM2
ECHO MV LVOT VTI INDEX: 2.1
ECHO MV MAX VELOCITY: 1.1 M/S
ECHO MV MEAN GRADIENT: 2 MMHG
ECHO MV MEAN VELOCITY: 0.6 M/S
ECHO MV PEAK GRADIENT: 4 MMHG
ECHO MV PRESSURE HALF TIME (PHT): 85.9 MS
ECHO MV REGURGITANT ALIASING (NYQUIST) VELOCITY: 30 CM/S
ECHO MV REGURGITANT RADIUS PISA: 0.68 CM
ECHO MV REGURGITANT VELOCITY PISA: 5.1 M/S
ECHO MV REGURGITANT VOLUME PISA: 32.51 ML
ECHO MV REGURGITANT VTIA: 190.3 CM
ECHO MV VTI: 35 CM
ECHO PULMONARY ARTERY END DIASTOLIC PRESSURE: 4 MMHG
ECHO PV MAX VELOCITY: 0.8 M/S
ECHO PV MEAN GRADIENT: 1 MMHG
ECHO PV MEAN VELOCITY: 0.5 M/S
ECHO PV PEAK GRADIENT: 3 MMHG
ECHO PV REGURGITANT MAX VELOCITY: 1 M/S
ECHO PV VTI: 17 CM
ECHO RV INTERNAL DIMENSION: 3.2 CM
ECHO RV LONGITUDINAL DIMENSION: 7.3 CM
ECHO RV MID DIMENSION: 4 CM
ECHO RVOT MEAN GRADIENT: 0 MMHG
ECHO RVOT PEAK GRADIENT: 1 MMHG
ECHO RVOT PEAK VELOCITY: 0.4 M/S
ECHO RVOT VTI: 8.4 CM
ECHO TV REGURGITANT MAX VELOCITY: 3.58 M/S
ECHO TV REGURGITANT PEAK GRADIENT: 51 MMHG
EOSINOPHIL # BLD: 0.25 K/UL (ref 0.05–0.5)
EOSINOPHILS RELATIVE PERCENT: 5 % (ref 0–6)
ERYTHROCYTE [DISTWIDTH] IN BLOOD BY AUTOMATED COUNT: 15.9 % (ref 11.5–15)
GFR, ESTIMATED: 7 ML/MIN/1.73M2
GFR, ESTIMATED: 7 ML/MIN/1.73M2
GLUCOSE BLD-MCNC: 124 MG/DL (ref 74–99)
GLUCOSE BLD-MCNC: 145 MG/DL (ref 74–99)
GLUCOSE BLD-MCNC: 149 MG/DL (ref 74–99)
GLUCOSE BLD-MCNC: 305 MG/DL (ref 74–99)
GLUCOSE SERPL-MCNC: 109 MG/DL (ref 74–99)
GLUCOSE SERPL-MCNC: 146 MG/DL (ref 74–99)
HCT VFR BLD AUTO: 35.8 % (ref 34–48)
HGB BLD-MCNC: 11.5 G/DL (ref 11.5–15.5)
IMM GRANULOCYTES # BLD AUTO: <0.03 K/UL (ref 0–0.58)
IMM GRANULOCYTES NFR BLD: 0 % (ref 0–5)
LYMPHOCYTES NFR BLD: 0.84 K/UL (ref 1.5–4)
LYMPHOCYTES RELATIVE PERCENT: 18 % (ref 20–42)
MAGNESIUM SERPL-MCNC: 1.9 MG/DL (ref 1.6–2.4)
MCH RBC QN AUTO: 27.5 PG (ref 26–35)
MCHC RBC AUTO-ENTMCNC: 32.1 G/DL (ref 32–34.5)
MCV RBC AUTO: 85.6 FL (ref 80–99.9)
MONOCYTES NFR BLD: 0.59 K/UL (ref 0.1–0.95)
MONOCYTES NFR BLD: 13 % (ref 2–12)
NEUTROPHILS NFR BLD: 63 % (ref 43–80)
NEUTS SEG NFR BLD: 2.93 K/UL (ref 1.8–7.3)
PHOSPHATE SERPL-MCNC: 5.3 MG/DL (ref 2.5–4.5)
PLATELET # BLD AUTO: 248 K/UL (ref 130–450)
PMV BLD AUTO: 11.8 FL (ref 7–12)
POTASSIUM SERPL-SCNC: 4.6 MMOL/L (ref 3.5–5.1)
POTASSIUM SERPL-SCNC: 4.7 MMOL/L (ref 3.5–5.1)
PROT SERPL-MCNC: 6.1 G/DL (ref 6.4–8.3)
RBC # BLD AUTO: 4.18 M/UL (ref 3.5–5.5)
SODIUM SERPL-SCNC: 133 MMOL/L (ref 136–145)
SODIUM SERPL-SCNC: 135 MMOL/L (ref 136–145)
WBC OTHER # BLD: 4.7 K/UL (ref 4.5–11.5)

## 2025-07-13 PROCEDURE — 2060000000 HC ICU INTERMEDIATE R&B

## 2025-07-13 PROCEDURE — 83735 ASSAY OF MAGNESIUM: CPT

## 2025-07-13 PROCEDURE — 97110 THERAPEUTIC EXERCISES: CPT

## 2025-07-13 PROCEDURE — 2500000003 HC RX 250 WO HCPCS: Performed by: INTERNAL MEDICINE

## 2025-07-13 PROCEDURE — 80053 COMPREHEN METABOLIC PANEL: CPT

## 2025-07-13 PROCEDURE — 36415 COLL VENOUS BLD VENIPUNCTURE: CPT

## 2025-07-13 PROCEDURE — 97530 THERAPEUTIC ACTIVITIES: CPT

## 2025-07-13 PROCEDURE — 80048 BASIC METABOLIC PNL TOTAL CA: CPT

## 2025-07-13 PROCEDURE — 85025 COMPLETE CBC W/AUTO DIFF WBC: CPT

## 2025-07-13 PROCEDURE — 6370000000 HC RX 637 (ALT 250 FOR IP)

## 2025-07-13 PROCEDURE — 76770 US EXAM ABDO BACK WALL COMP: CPT

## 2025-07-13 PROCEDURE — 97165 OT EVAL LOW COMPLEX 30 MIN: CPT

## 2025-07-13 PROCEDURE — 2580000003 HC RX 258: Performed by: INTERNAL MEDICINE

## 2025-07-13 PROCEDURE — 74176 CT ABD & PELVIS W/O CONTRAST: CPT

## 2025-07-13 PROCEDURE — 84100 ASSAY OF PHOSPHORUS: CPT

## 2025-07-13 PROCEDURE — 82962 GLUCOSE BLOOD TEST: CPT

## 2025-07-13 RX ADMIN — ATORVASTATIN CALCIUM 40 MG: 40 TABLET, FILM COATED ORAL at 08:37

## 2025-07-13 RX ADMIN — METOPROLOL SUCCINATE 75 MG: 25 TABLET, EXTENDED RELEASE ORAL at 20:46

## 2025-07-13 RX ADMIN — SODIUM BICARBONATE: 84 INJECTION, SOLUTION INTRAVENOUS at 23:02

## 2025-07-13 RX ADMIN — HYDRALAZINE HYDROCHLORIDE 25 MG: 25 TABLET ORAL at 20:46

## 2025-07-13 RX ADMIN — METOPROLOL SUCCINATE 75 MG: 25 TABLET, EXTENDED RELEASE ORAL at 08:37

## 2025-07-13 RX ADMIN — HYDRALAZINE HYDROCHLORIDE 25 MG: 25 TABLET ORAL at 14:05

## 2025-07-13 RX ADMIN — PANTOPRAZOLE SODIUM 40 MG: 40 TABLET, DELAYED RELEASE ORAL at 06:09

## 2025-07-13 RX ADMIN — LEVOTHYROXINE SODIUM 50 MCG: 0.05 TABLET ORAL at 06:09

## 2025-07-13 RX ADMIN — ACETAMINOPHEN 650 MG: 325 TABLET ORAL at 20:51

## 2025-07-13 RX ADMIN — HYDRALAZINE HYDROCHLORIDE 25 MG: 25 TABLET ORAL at 08:37

## 2025-07-13 RX ADMIN — INSULIN LISPRO 6 UNITS: 100 INJECTION, SOLUTION INTRAVENOUS; SUBCUTANEOUS at 12:27

## 2025-07-13 RX ADMIN — CLOPIDOGREL BISULFATE 75 MG: 75 TABLET, FILM COATED ORAL at 08:37

## 2025-07-13 RX ADMIN — ASPIRIN 81 MG: 81 TABLET, CHEWABLE ORAL at 08:38

## 2025-07-13 NOTE — PROGRESS NOTES
Internal Medicine Consult Note    [facility]    Vishal Jarrett D.O., VICI.                    Cecil Laureano D.O., SANDRA Merida D.O.     Mahamed Kebede, MSN, APRN-CNP  Yusuf Grijalva, MSN, APRN-CNP  Lisandra Do, MSN. APRN-NP-C  Linda Kim MSN, APRN, ACNP-AG     Primary Care Physician: Jesse Rojas DO   Admitting Physician:  Vishal Jarrett DO  Admission date and time: 7/10/2025 12:36 PM    Room:  [address] [place] [address]  Admitting diagnosis: Cerebral artery occlusion [I66.9]  Elevated troponin [R79.89]  Acute CVA (cerebrovascular accident) (HCC) [I63.9]  Altered mental status, unspecified altered mental status type [R41.82]  Chronic renal impairment, stage 3 (moderate), unspecified whether stage 3a or 3b CKD (HCC) [N18.30]    Patient Name: Sabrina Bajwa  MRN: [id number]    Date of Service: 7/13/2025     Subjective:  Sabrina is a 70 y.o. female who was seen and examined today,7/13/2025, at the bedside.  Patient resting comfortably in bed.  Has been no further changes in her neurological status.  Patient continues to have expressive aphasia but able to speak 1 or 2 words clearly at a time.  Her right arm and leg are flaccid.  Unable to obtain MRI due to pacemaker.  Patient denies any pain or discomfort.    No family present during my examination.    Review of System:   Constitutional:   Denies fever or chills, weight loss or gain, positive fatigue  HEENT:   Denies ear pain, sore throat, sinus or eye problems.  Cardiovascular:   Denies any chest pain, irregular heartbeats, or palpitations.   Respiratory:   Denies shortness of breath, coughing, sputum production, hemoptysis, or wheezing.  Gastrointestinal:   Denies nausea, vomiting, diarrhea, or constipation.  Denies any abdominal pain.  Genitourinary:    Denies any urgency, frequency, hematuria. Voiding  without difficulty.  Extremities:   Denies lower extremity swelling, edema or cyanosis.

## 2025-07-13 NOTE — PLAN OF CARE
Problem: Chronic Conditions and Co-morbidities  Goal: Patient's chronic conditions and co-morbidity symptoms are monitored and maintained or improved  Outcome: Progressing  Flowsheets (Taken 7/13/2025 0800)  Care Plan - Patient's Chronic Conditions and Co-Morbidity Symptoms are Monitored and Maintained or Improved: Monitor and assess patient's chronic conditions and comorbid symptoms for stability, deterioration, or improvement     Problem: Discharge Planning  Goal: Discharge to home or other facility with appropriate resources  Outcome: Progressing  Flowsheets (Taken 7/13/2025 0800)  Discharge to home or other facility with appropriate resources: Identify barriers to discharge with patient and caregiver     Problem: Safety - Adult  Goal: Free from fall injury  Outcome: Progressing  Flowsheets (Taken 7/13/2025 0800)  Free From Fall Injury: Instruct family/caregiver on patient safety     Problem: Skin/Tissue Integrity  Goal: Skin integrity remains intact  Description: 1.  Monitor for areas of redness and/or skin breakdown  2.  Assess vascular access sites hourly  3.  Every 4-6 hours minimum:  Change oxygen saturation probe site  4.  Every 4-6 hours:  If on nasal continuous positive airway pressure, respiratory therapy assess nares and determine need for appliance change or resting period  Outcome: Progressing  Flowsheets (Taken 7/13/2025 0800)  Skin Integrity Remains Intact: Monitor for areas of redness and/or skin breakdown     Problem: ABCDS Injury Assessment  Goal: Absence of physical injury  Outcome: Progressing  Flowsheets (Taken 7/13/2025 0800)  Absence of Physical Injury: Implement safety measures based on patient assessment

## 2025-07-13 NOTE — PROGRESS NOTES
Cardiology  Progress Note      SUBJECTIVE: Patient was able to say few words this morning when I came to see her.  She appears to have generalized upper and lower extremity weakness.  No acute distress    Current Inpatient Medications  Current Facility-Administered Medications: 0.9 % sodium chloride infusion, , IntraVENous, Continuous  atorvastatin (LIPITOR) tablet 40 mg, 40 mg, Oral, Daily  hydrALAZINE (APRESOLINE) tablet 25 mg, 25 mg, Oral, TID  methocarbamol (ROBAXIN) tablet 750 mg, 750 mg, Oral, 4x Daily PRN  metoprolol succinate (TOPROL XL) extended release tablet 75 mg, 75 mg, Oral, BID  hydrALAZINE (APRESOLINE) injection 5 mg, 5 mg, IntraVENous, Q4H PRN  labetalol (NORMODYNE;TRANDATE) injection 5 mg, 5 mg, IntraVENous, Q4H PRN  sulfur hexafluoride microspheres (LUMASON) 60.7-25 MG injection 2 mL, 2 mL, IntraVENous, ONCE PRN  pantoprazole (PROTONIX) tablet 40 mg, 40 mg, Oral, QAM AC  acetaminophen (TYLENOL) tablet 650 mg, 650 mg, Oral, Q6H PRN  prochlorperazine (COMPAZINE) injection 10 mg, 10 mg, IntraVENous, Q6H PRN  melatonin disintegrating tablet 5 mg, 5 mg, Oral, Nightly PRN  aspirin chewable tablet 81 mg, 81 mg, Oral, Daily  clopidogrel (PLAVIX) tablet 75 mg, 75 mg, Oral, Daily  insulin lispro (HUMALOG,ADMELOG) injection vial 0-8 Units, 0-8 Units, SubCUTAneous, 4x Daily AC & HS  glucose chewable tablet 16 g, 4 tablet, Oral, PRN  dextrose bolus 10% 125 mL, 125 mL, IntraVENous, PRN **OR** dextrose bolus 10% 250 mL, 250 mL, IntraVENous, PRN  glucagon injection 1 mg, 1 mg, SubCUTAneous, PRN  dextrose 10 % infusion, , IntraVENous, Continuous PRN  levothyroxine (SYNTHROID) tablet 50 mcg, 50 mcg, Oral, Daily      Physical  VITALS:  /87   Pulse 60   Temp 97.9 °F (36.6 °C)   Resp 17   Ht 1.549 m (5' 1\")   Wt 83.9 kg (185 lb)   SpO2 99%   BMI 34.96 kg/m²   CURRENT TEMPERATURE:  Temp: 97.9 °F (36.6 °C)  CONSTITUTIONAL: No acute distress.  EYES: Vision is intact.  ENT: No sore throat.  No ear

## 2025-07-13 NOTE — PROGRESS NOTES
OCCUPATIONAL THERAPY INITIAL EVALUATION    [facility]  [address] [place]. [place]        Date:2025                                                  Patient Name: Sabrina Bajwa    MRN: [id number]    : 1955    Room: [address] [place] [address]      Evaluating OT: Kevin Quijano OTR/L; #[id number]     Referring Provider and Specific Provider Orders/Date:      07/10/25 2115  OT eval and treat  Start:  07/10/25 2115,   End:  07/10/25 2115,   ONE TIME,   Standing Count:  1 Occurrences,   R         Lisandra Do, APRN - CNP        Placement Recommendation: Acute Rehab vs subacute rehab       Diagnosis:   1. Altered mental status, unspecified altered mental status type    2. Chronic renal impairment, stage 3 (moderate), unspecified whether stage 3a or 3b CKD (HCC)    3. Elevated troponin    4. Acute CVA (cerebrovascular accident) (HCC)         Surgery: None      Pertinent Medical History:       Past Medical History:   Diagnosis Date    Arthritis     Asthma     Diabetes mellitus (HCC)     GERD (gastroesophageal reflux disease)     Hyperlipidemia     Hypertension     Immune deficiency disorder     MI, old     Microalbuminuria due to type 2 diabetes mellitus (HCC) 2016    Mitral valve regurgitation     Vitamin D deficiency 2016         Past Surgical History:   Procedure Laterality Date    BREAST SURGERY      reduction    lumpectomy    CHOLECYSTECTOMY, LAPAROSCOPIC N/A 2022    LAPAROSCOPIC CHOLECYSTECTOMY performed by Ernie Medina MD at [facility] OR    COLONOSCOPY      FRACTURE SURGERY      right thumb and tailbone    HYSTERECTOMY (CERVIX STATUS UNKNOWN)      PACEMAKER PLACEMENT      defibrillator at [facility]     UPPER GASTROINTESTINAL ENDOSCOPY N/A 2021    EGD ESOPHAGOGASTRODUODENOSCOPY ULTRASOUND performed by Ernie Medina MD at [facility] OR        Precautions:  Fall Risk, R UE and LE Flaccid, Expressive aphasia, Use lift equipment, Up

## 2025-07-13 NOTE — PROGRESS NOTES
Pt returns call, answered questions regarding 2 new prescriptions-no alcohol, no intercourse for 3 weeks, f/u appt has been made.   Spoke with Kamar Grijalva, on for dr. Jarrett, regarding pt's decreased urine output during shift, paired with bladder scan of 85ml. Instructed to keep an eye on it, and bladder scan q4h if urine output remains absent.

## 2025-07-13 NOTE — PROGRESS NOTES
Progress Note  7/13/2025 10:16 AM  Subjective:   Admit Date: 7/10/2025  PCP: Jesse Rojas,   Interval History: Patient examined , more lethargic today , po intake poor  , creatinine higher     Diet: ADULT DIET; Regular; 5 carb choices (75 gm/meal); Low Fat/Low Chol/High Fiber/MARJAN    Data:   Scheduled Meds:   atorvastatin  40 mg Oral Daily    hydrALAZINE  25 mg Oral TID    metoprolol succinate  75 mg Oral BID    pantoprazole  40 mg Oral QAM AC    aspirin  81 mg Oral Daily    clopidogrel  75 mg Oral Daily    insulin lispro  0-8 Units SubCUTAneous 4x Daily AC & HS    levothyroxine  50 mcg Oral Daily     Continuous Infusions:   sodium chloride 100 mL/hr at 07/12/25 1240    dextrose       PRN Meds:methocarbamol, hydrALAZINE, labetalol, sulfur hexafluoride microspheres, acetaminophen, prochlorperazine, melatonin, glucose, dextrose bolus **OR** dextrose bolus, glucagon (rDNA), dextrose  I/O last 3 completed shifts:  In: -   Out: 200 [Urine:200]  No intake/output data recorded.    Intake/Output Summary (Last 24 hours) at 7/13/2025 1016  Last data filed at 7/13/2025 0337  Gross per 24 hour   Intake --   Output 200 ml   Net -200 ml     CBC:   Recent Labs     07/11/25 0624 07/12/25  0700 07/13/25  0818   WBC 3.6* 4.4* 4.7   HGB 11.6 11.8 11.5    295 248     BMP:    Recent Labs     07/11/25  0624 07/11/25  1616 07/12/25  0700 07/13/25  0818     --  138 133*   K 3.7  --  4.6 4.6     --  103 101   CO2 20*  --  17* 18*   BUN 51*  --  61* 68*   CREATININE 3.0*  --  4.5* 5.9*   GLUCOSE 167* 134 165* 146*     Hepatic:   Recent Labs     07/11/25  0624 07/12/25  0700 07/13/25  0818   AST 31 25 24   ALT 28 24 22   BILITOT 0.6 0.7 0.6   ALKPHOS 128* 128* 119*     Troponin: No results for input(s): \"TROPONINI\" in the last 72 hours.  BNP: No results for input(s): \"BNP\" in the last 72 hours.  Lipids:   Recent Labs     07/11/25  0624   CHOL 175   HDL 79     ABGs: No results found for: \"PHART\", \"PO2ART\",  Intractable vomiting     Dilated cbd, acquired     Cerebral artery occlusion     Acute CVA (cerebrovascular accident) (HCC)     Acute encephalopathy     Right arm weakness     Asymptomatic occlusion of anterior cerebral artery    Plan:     Assessment/Plan:     1-CKD G4A3  -Creatinine of 3.0 mg/dL is roughly around recent baseline, at my last office appointment was 3.25 mg/dL with GFR 15  - Patient has significant proteinuria, recent UPCR 3.72 g with UACR 2407 mg/g  - Etiology of chronic kidney disease is likely cardiomyopathy/cardiorenal syndrome with likely diabetic nephropathy  - Patient has not attended vein mapping at the dialysis access center  - Update urine studies  - Will continue to follow      LYDIA   Cr 3.0>>4.5 >>5.9 , received lasix , possible marginal intake , no further diuretics , NS @ 100 cc/hr x 24 hrs      Possible contrast related Nephropathy , will place a snider x 24 hrs , if oliguric and no improvement of renal functions       May need dialysis      2-BP/volume status BP fair control   -Hypertension with CKD G1-G4  - As of last office appointment patient was on metoprolol succinate 50 mg daily, hydralazine 25 mg twice daily, Entresto 97-23 mg twice daily and was on Bumex 1 mg daily  - Presently the patient is receiving hydralazine 25 mg 3 times daily, metoprolol succinate 75 mg twice daily,   - Appreciate cardiology input  - Cannot tolerate SGLT2 due to low renal function        3-Electrolyte/Acid base disorders  -Satisfactory electrolyte panel  - Chronic metabolic acidosis in setting of chronic kidney disease, CO2 20, below goal of 20-23 will add sodium bicarbonate     Po         4-CKD-MBD  -Secondary hyperparathyroidism of renal origin patient had been on calcitriol 0.25 mcg Monday Wednesday Friday we will restart this  - Calcium 7.9  mg/dL with albumin 3.2 g/dL, borderline hypocalcemia will check vitamin D levels again, phosphorus 5.3   magnesium level 1.9      5-Anemia of CKD  -Hemoglobin

## 2025-07-14 ENCOUNTER — APPOINTMENT (OUTPATIENT)
Dept: INTERVENTIONAL RADIOLOGY/VASCULAR | Age: 70
DRG: 064 | End: 2025-07-14
Payer: COMMERCIAL

## 2025-07-14 LAB
ALBUMIN SERPL-MCNC: 3.2 G/DL (ref 3.5–5.2)
ALP SERPL-CCNC: 118 U/L (ref 35–104)
ALT SERPL-CCNC: 21 U/L (ref 0–35)
ANION GAP SERPL CALCULATED.3IONS-SCNC: 17 MMOL/L (ref 7–16)
AST SERPL-CCNC: 31 U/L (ref 0–35)
BASOPHILS # BLD: 0.05 K/UL (ref 0–0.2)
BASOPHILS NFR BLD: 1 % (ref 0–2)
BILIRUB SERPL-MCNC: 0.6 MG/DL (ref 0–1.2)
BUN SERPL-MCNC: 72 MG/DL (ref 8–23)
CALCIUM SERPL-MCNC: 8.3 MG/DL (ref 8.8–10.2)
CHLORIDE SERPL-SCNC: 100 MMOL/L (ref 98–107)
CO2 SERPL-SCNC: 18 MMOL/L (ref 22–29)
CREAT SERPL-MCNC: 6.4 MG/DL (ref 0.5–1)
EOSINOPHIL # BLD: 0.36 K/UL (ref 0.05–0.5)
EOSINOPHILS RELATIVE PERCENT: 9 % (ref 0–6)
ERYTHROCYTE [DISTWIDTH] IN BLOOD BY AUTOMATED COUNT: 15.8 % (ref 11.5–15)
GFR, ESTIMATED: 7 ML/MIN/1.73M2
GLUCOSE BLD-MCNC: 142 MG/DL (ref 74–99)
GLUCOSE BLD-MCNC: 154 MG/DL (ref 74–99)
GLUCOSE BLD-MCNC: 203 MG/DL (ref 74–99)
GLUCOSE BLD-MCNC: 283 MG/DL (ref 74–99)
GLUCOSE SERPL-MCNC: 124 MG/DL (ref 74–99)
HAV IGM SERPL QL IA: NONREACTIVE
HBA1C MFR BLD: 7.5 % (ref 4–5.6)
HBV CORE IGM SERPL QL IA: NONREACTIVE
HBV SURFACE AB SERPL IA-ACNC: <3.5 MIU/ML (ref 0–9.99)
HBV SURFACE AG SERPL QL IA: NONREACTIVE
HCT VFR BLD AUTO: 35.6 % (ref 34–48)
HCV AB SERPL QL IA: NONREACTIVE
HGB BLD-MCNC: 11.4 G/DL (ref 11.5–15.5)
IMM GRANULOCYTES # BLD AUTO: <0.03 K/UL (ref 0–0.58)
IMM GRANULOCYTES NFR BLD: 0 % (ref 0–5)
LYMPHOCYTES NFR BLD: 0.65 K/UL (ref 1.5–4)
LYMPHOCYTES RELATIVE PERCENT: 16 % (ref 20–42)
MCH RBC QN AUTO: 27.1 PG (ref 26–35)
MCHC RBC AUTO-ENTMCNC: 32 G/DL (ref 32–34.5)
MCV RBC AUTO: 84.8 FL (ref 80–99.9)
MONOCYTES NFR BLD: 0.51 K/UL (ref 0.1–0.95)
MONOCYTES NFR BLD: 13 % (ref 2–12)
NEUTROPHILS NFR BLD: 61 % (ref 43–80)
NEUTS SEG NFR BLD: 2.44 K/UL (ref 1.8–7.3)
PLATELET # BLD AUTO: 242 K/UL (ref 130–450)
PMV BLD AUTO: 11.8 FL (ref 7–12)
POTASSIUM SERPL-SCNC: 4.1 MMOL/L (ref 3.5–5.1)
PROT SERPL-MCNC: 5.9 G/DL (ref 6.4–8.3)
RBC # BLD AUTO: 4.2 M/UL (ref 3.5–5.5)
SODIUM SERPL-SCNC: 135 MMOL/L (ref 136–145)
WBC OTHER # BLD: 4 K/UL (ref 4.5–11.5)

## 2025-07-14 PROCEDURE — C1769 GUIDE WIRE: HCPCS

## 2025-07-14 PROCEDURE — 83036 HEMOGLOBIN GLYCOSYLATED A1C: CPT

## 2025-07-14 PROCEDURE — 36556 INSERT NON-TUNNEL CV CATH: CPT

## 2025-07-14 PROCEDURE — 6370000000 HC RX 637 (ALT 250 FOR IP)

## 2025-07-14 PROCEDURE — 5A1D70Z PERFORMANCE OF URINARY FILTRATION, INTERMITTENT, LESS THAN 6 HOURS PER DAY: ICD-10-PCS | Performed by: INTERNAL MEDICINE

## 2025-07-14 PROCEDURE — 77001 FLUOROGUIDE FOR VEIN DEVICE: CPT

## 2025-07-14 PROCEDURE — 85025 COMPLETE CBC W/AUTO DIFF WBC: CPT

## 2025-07-14 PROCEDURE — 80074 ACUTE HEPATITIS PANEL: CPT

## 2025-07-14 PROCEDURE — 82962 GLUCOSE BLOOD TEST: CPT

## 2025-07-14 PROCEDURE — 02HV33Z INSERTION OF INFUSION DEVICE INTO SUPERIOR VENA CAVA, PERCUTANEOUS APPROACH: ICD-10-PCS | Performed by: INTERNAL MEDICINE

## 2025-07-14 PROCEDURE — 90935 HEMODIALYSIS ONE EVALUATION: CPT

## 2025-07-14 PROCEDURE — 2500000003 HC RX 250 WO HCPCS: Performed by: INTERNAL MEDICINE

## 2025-07-14 PROCEDURE — 86317 IMMUNOASSAY INFECTIOUS AGENT: CPT

## 2025-07-14 PROCEDURE — 36415 COLL VENOUS BLD VENIPUNCTURE: CPT

## 2025-07-14 PROCEDURE — 2060000000 HC ICU INTERMEDIATE R&B

## 2025-07-14 PROCEDURE — 80053 COMPREHEN METABOLIC PANEL: CPT

## 2025-07-14 PROCEDURE — 76937 US GUIDE VASCULAR ACCESS: CPT

## 2025-07-14 PROCEDURE — 2580000003 HC RX 258: Performed by: INTERNAL MEDICINE

## 2025-07-14 RX ADMIN — ATORVASTATIN CALCIUM 40 MG: 40 TABLET, FILM COATED ORAL at 18:11

## 2025-07-14 RX ADMIN — INSULIN LISPRO 2 UNITS: 100 INJECTION, SOLUTION INTRAVENOUS; SUBCUTANEOUS at 13:21

## 2025-07-14 RX ADMIN — METOPROLOL SUCCINATE 75 MG: 25 TABLET, EXTENDED RELEASE ORAL at 20:18

## 2025-07-14 RX ADMIN — PANTOPRAZOLE SODIUM 40 MG: 40 TABLET, DELAYED RELEASE ORAL at 06:27

## 2025-07-14 RX ADMIN — Medication 5 MG: at 20:19

## 2025-07-14 RX ADMIN — LEVOTHYROXINE SODIUM 50 MCG: 0.05 TABLET ORAL at 06:27

## 2025-07-14 RX ADMIN — HYDRALAZINE HYDROCHLORIDE 25 MG: 25 TABLET ORAL at 18:11

## 2025-07-14 RX ADMIN — ACETAMINOPHEN 650 MG: 325 TABLET ORAL at 20:22

## 2025-07-14 RX ADMIN — HYDRALAZINE HYDROCHLORIDE 25 MG: 25 TABLET ORAL at 20:18

## 2025-07-14 RX ADMIN — INSULIN LISPRO 4 UNITS: 100 INJECTION, SOLUTION INTRAVENOUS; SUBCUTANEOUS at 20:12

## 2025-07-14 RX ADMIN — SODIUM BICARBONATE: 84 INJECTION, SOLUTION INTRAVENOUS at 14:32

## 2025-07-14 ASSESSMENT — PAIN - FUNCTIONAL ASSESSMENT: PAIN_FUNCTIONAL_ASSESSMENT: ACTIVITIES ARE NOT PREVENTED

## 2025-07-14 ASSESSMENT — PAIN DESCRIPTION - LOCATION: LOCATION: ABDOMEN

## 2025-07-14 ASSESSMENT — PAIN DESCRIPTION - DESCRIPTORS: DESCRIPTORS: CRAMPING

## 2025-07-14 ASSESSMENT — PAIN SCALES - GENERAL: PAINLEVEL_OUTOF10: 1

## 2025-07-14 ASSESSMENT — PAIN DESCRIPTION - ORIENTATION: ORIENTATION: LOWER;MID

## 2025-07-14 NOTE — PROGRESS NOTES
Dr. Mcmullen returned call to speak with RN Regarding pt MRI of the brain being canceled r/t [employer] not being able to override     Dr. Mcmullen requesting pt to be transferred to a Tertiary care center

## 2025-07-14 NOTE — PROGRESS NOTES
Attempted tx with pt, however pt currently OOR at [facility].  Will attempt tx with pt at later time/date. JENNA Chauhan/KAREN #[id number]

## 2025-07-14 NOTE — PROGRESS NOTES
Pt urine in snider noted to be a light pink/red @ 0830    Urine appeared to be slighter darker than earlier urine more cherry/red with red flecks/ sediment in tubing @ 1827     RN notified ALFONZO Fry as pt is currently taking aspirin & Plavix

## 2025-07-14 NOTE — FLOWSHEET NOTE
07/14/25 1727   Vital Signs   BP (!) 147/96   Temp 98.6 °F (37 °C)   Pulse 72   Respirations 18   Post-Hemodialysis Assessment   Post-Treatment Procedures Blood returned;Catheter capped, clamped and heparinized x 2 ports   Machine Disinfection Process Acid/Vinegar Clean;Heat Disinfect   Rinseback Volume (ml) 300 ml   Blood Volume Processed (Liters) 28.1 L   Dialyzer Clearance Lightly streaked   Duration of Treatment (minutes) 120 minutes   Heparin Amount Administered During Treatment (mL) 0 mL   Hemodialysis Intake (ml) 300 ml   Hemodialysis Output (ml) 1300 ml   NET Removed (ml) 1000   Tolerated Treatment Good   Patient Response to Treatment tolerated treatment well.  1 liter removed. Education and emotional support provided   Bilateral Breath Sounds Diminished   Time Off 1700   Patient Disposition Return to room   Observations & Evaluations   Level of Consciousness 0   Oriented X 3   Heart Rhythm Regular   Respiratory Quality/Effort Unlabored   Skin Condition/Temp Cool;Dry   Handoff   Communication Given Shift Handoff   Handoff Given To Concepcion FRANCO   Handoff Received From Dayna Brandt RN

## 2025-07-14 NOTE — PROGRESS NOTES
Internal Medicine Consult Note    [facility]    Vishal Jarrett D.O., VICI.                    Cecil Laureano D.O., SANDRA Merida D.O.     Mahamed Kebede, MSN, APRN-CNP  Yusuf Grijalva, MSN, APRN-CNP  Lisandra Do, MSN. APRN-NP-C  Linda Kim MSN, APRN, ACNP-AG     Primary Care Physician: Jesse Rojas DO   Admitting Physician:  Vishal Jarrett DO  Admission date and time: 7/10/2025 12:36 PM    Room:  [address] [place] [address]  Admitting diagnosis: Cerebral artery occlusion [I66.9]  Elevated troponin [R79.89]  Acute CVA (cerebrovascular accident) (HCC) [I63.9]  Altered mental status, unspecified altered mental status type [R41.82]  Chronic renal impairment, stage 3 (moderate), unspecified whether stage 3a or 3b CKD (HCC) [N18.30]    Patient Name: Sabrina Bajwa  MRN: [id number]    Date of Service: 7/14/2025     Subjective:  Sabrina is a 70 y.o. female who was seen and examined today,7/14/2025, at the bedside.  Sabrina is seen in the hallway prior to placement of dialysis catheter.  Speech has improved but remains with significant right-sided deficits. As mentioned previously, she is unable to have MRI of the brain for further evaluation of stroke due to pacemaker incompatibility.  She voices no new symptoms or concerns.  There is no family present during my examination.    Review of System: Limited  Constitutional:   Does not report fever or chills, weight loss or gain, positive fatigue  HEENT:   Does not report  ear pain, sore throat, sinus or eye problems.  Cardiovascular:   Does not report any chest pain, or palpitations.   Respiratory:   Does not report  shortness of breath, coughing, sputum production, hemoptysis, or wheezing.  Gastrointestinal:   Does not report  nausea, vomiting, diarrhea, or constipation.  Denies any abdominal pain.  Genitourinary:    Does not report any urgency, frequency, hematuria. Voiding  without difficulty.  Extremities:  the IMC/Telemetry unit due the complexity of decision management and chance of rapid decline or death.  Continued cardiac monitoring and higher level of nursing are required. I am readily available for any further decision-making and intervention.     More than 50% of my  time was spent at the bedside counseling/coordinating care with the patient and/or family with face to face contact.  This time was spent reviewing notes and laboratory data as well as instructing and counseling the patient. Time I spent with the family or surrogate(s) is included only if the patient was incapable of providing the necessary information or participating in medical decisions. I also discussed the differential diagnosis and all of the proposed management plans with the patient and individuals accompanying the patient.    JADE Grande CNP  7/14/2025  10:52 AM

## 2025-07-14 NOTE — PROGRESS NOTES
Per peggy in MRI \" pt cannot get MRI of brain due to [employer] representative not being able to do an override, so the MRI of the brain was discontinued \"    RN attempted to contact Dr. Mcmullen & notify him, no success call was not answered.

## 2025-07-14 NOTE — PROGRESS NOTES
Cardiology  Progress Note      SUBJECTIVE: Patient was alert this morning to time place and person.  I asked her about her name and she was able to answer that clearly.  She knows she is at [facility].    Current Inpatient Medications  Current Facility-Administered Medications: sodium bicarbonate 150 mEq in dextrose 5 % 1,000 mL infusion, , IntraVENous, Continuous  atorvastatin (LIPITOR) tablet 40 mg, 40 mg, Oral, Daily  hydrALAZINE (APRESOLINE) tablet 25 mg, 25 mg, Oral, TID  methocarbamol (ROBAXIN) tablet 750 mg, 750 mg, Oral, 4x Daily PRN  metoprolol succinate (TOPROL XL) extended release tablet 75 mg, 75 mg, Oral, BID  hydrALAZINE (APRESOLINE) injection 5 mg, 5 mg, IntraVENous, Q4H PRN  labetalol (NORMODYNE;TRANDATE) injection 5 mg, 5 mg, IntraVENous, Q4H PRN  sulfur hexafluoride microspheres (LUMASON) 60.7-25 MG injection 2 mL, 2 mL, IntraVENous, ONCE PRN  pantoprazole (PROTONIX) tablet 40 mg, 40 mg, Oral, QAM AC  acetaminophen (TYLENOL) tablet 650 mg, 650 mg, Oral, Q6H PRN  prochlorperazine (COMPAZINE) injection 10 mg, 10 mg, IntraVENous, Q6H PRN  melatonin disintegrating tablet 5 mg, 5 mg, Oral, Nightly PRN  aspirin chewable tablet 81 mg, 81 mg, Oral, Daily  clopidogrel (PLAVIX) tablet 75 mg, 75 mg, Oral, Daily  insulin lispro (HUMALOG,ADMELOG) injection vial 0-8 Units, 0-8 Units, SubCUTAneous, 4x Daily AC & HS  glucose chewable tablet 16 g, 4 tablet, Oral, PRN  dextrose bolus 10% 125 mL, 125 mL, IntraVENous, PRN **OR** dextrose bolus 10% 250 mL, 250 mL, IntraVENous, PRN  glucagon injection 1 mg, 1 mg, SubCUTAneous, PRN  dextrose 10 % infusion, , IntraVENous, Continuous PRN  levothyroxine (SYNTHROID) tablet 50 mcg, 50 mcg, Oral, Daily      Physical  VITALS:  BP (!) 141/85   Pulse 65   Temp 98.2 °F (36.8 °C) (Oral)   Resp 19   Ht 1.549 m (5' 1\")   Wt 83.9 kg (185 lb)   SpO2 100%   BMI 34.96 kg/m²   CURRENT TEMPERATURE:  Temp: 98.2 °F (36.8 °C)  CONSTITUTIONAL: No acute distress.  EYES: Vision

## 2025-07-14 NOTE — PROGRESS NOTES
Patient came down to special procedures for temporary dialysis catheter placement.      Procedure was explained and questions were answered. Consent signed.  Patient was educated about the amount of radiation used with today's procedure.   Patient prepped secured and draped.     Emotional support given     1140 - Procedure start VS /92 hr 64 r 17 97% ra      1150 - Procedure end VS /88 HR 65 R 16 99% RA supine     Temporary dialysis catheter to Right IJ. Catheter sutured in place. Tegaderm with CHG gel applied. CDI     nurse to nurse called, spoke with 5th floor RN, nurse notified of above information    Patient transported back to the floor.

## 2025-07-14 NOTE — CARE COORDINATION
SW Note: Attempted to complete CM assessment, pt out of room. Electronically signed by AGUEDA Jerome on 7/14/2025 at 4:06 PM

## 2025-07-14 NOTE — PLAN OF CARE
Problem: Chronic Conditions and Co-morbidities  Goal: Patient's chronic conditions and co-morbidity symptoms are monitored and maintained or improved  7/14/2025 1024 by Maisha Bains RN  Outcome: Progressing  7/14/2025 1020 by Maisha Bains RN  Outcome: Progressing     Problem: Discharge Planning  Goal: Discharge to home or other facility with appropriate resources  7/14/2025 1024 by Maisha Bains RN  Outcome: Progressing  7/14/2025 1020 by Maisha Bains RN  Outcome: Progressing     Problem: Safety - Adult  Goal: Free from fall injury  7/14/2025 1024 by Maisha Bains RN  Outcome: Progressing  7/14/2025 1020 by Maisha Bains RN  Outcome: Progressing     Problem: Skin/Tissue Integrity  Goal: Skin integrity remains intact  Description: 1.  Monitor for areas of redness and/or skin breakdown  2.  Assess vascular access sites hourly  3.  Every 4-6 hours minimum:  Change oxygen saturation probe site  4.  Every 4-6 hours:  If on nasal continuous positive airway pressure, respiratory therapy assess nares and determine need for appliance change or resting period  7/14/2025 1024 by Maisha Bains RN  Outcome: Progressing  7/14/2025 1020 by Maisha Bains RN  Outcome: Progressing     Problem: ABCDS Injury Assessment  Goal: Absence of physical injury  7/14/2025 1024 by Maisha Bains RN  Outcome: Progressing  7/14/2025 1020 by Maisha Bains RN  Outcome: Progressing

## 2025-07-14 NOTE — PROGRESS NOTES
Diabetes education attempted to provide education. Answered questions by nodding. Answered yes to having testing supplies at home, but admits to not testing daily. Answered yes to not wanting education. Answered yes that we can leave a Survival book at bedside with note to daughter Selma. Literature left at bedside with phone number if she would like to reach out to us.

## 2025-07-14 NOTE — PROGRESS NOTES
We are unable to scan this patient for MRI, per [employer], her device needs to be replaced before we can perform ANY MRI. MRI of the brain was ordered and discontinued d/t her monitor needing replaced.     [employer] is unable to do an override on their end to scan this patient as well.     Thank you

## 2025-07-14 NOTE — PROGRESS NOTES
RN attempted to get phone consent with pt daughter  \" Selma Bajwa \" for pt to get temporary dialysis cath & hemodialysis    Event unsuccessful as pt daughter refused to give consent because \" her mother has been here & she requested to have a call from the doctor on Saturday to get a update on her mother's status & she did not get a call \"    RN explained to Vanpamela Aydee that she was not here Saturday & she was not aware of the miscommunication.    RN offered to give Selma aydee an update regarding her mother's care per Selma \" she would like to speak with a doctor\"

## 2025-07-14 NOTE — PROGRESS NOTES
Per Dr. Schwab pt needs urgent dialysis    RN spoke with Donavan in special procedures & let him know Dr. Schwab wants urgent dialysis & would like temporary dialysis cath placed ASAP    Per donavan in special procedures \" he will give me a call back & let me know what's going on \"

## 2025-07-14 NOTE — PROGRESS NOTES
Physical Therapy  Physical Therapy    Room #:   0533/0533-02    Date: 2025       Patient Name: Sabrina Bajwa  : 1955      MRN: [id number]     Patient unavailable for physical therapy treatment due to off floor at [facility]. Will attempt PT treatment tomorrow. Thank you.      Kieran Stoddard  [facility]  LIC # [id number]

## 2025-07-15 LAB
ANION GAP SERPL CALCULATED.3IONS-SCNC: 14 MMOL/L (ref 7–16)
BACTERIA URNS QL MICRO: ABNORMAL
BASOPHILS # BLD: 0.12 K/UL (ref 0–0.2)
BASOPHILS NFR BLD: 4 % (ref 0–2)
BILIRUB UR QL STRIP: NEGATIVE
BUN SERPL-MCNC: 48 MG/DL (ref 8–23)
CALCIUM SERPL-MCNC: 8 MG/DL (ref 8.8–10.2)
CHLORIDE SERPL-SCNC: 100 MMOL/L (ref 98–107)
CLARITY UR: CLEAR
CO2 SERPL-SCNC: 24 MMOL/L (ref 22–29)
COLOR UR: YELLOW
CREAT SERPL-MCNC: 4.9 MG/DL (ref 0.5–1)
EOSINOPHIL # BLD: 0.34 K/UL (ref 0.05–0.5)
EOSINOPHILS RELATIVE PERCENT: 10 % (ref 0–6)
ERYTHROCYTE [DISTWIDTH] IN BLOOD BY AUTOMATED COUNT: 15.7 % (ref 11.5–15)
GFR, ESTIMATED: 9 ML/MIN/1.73M2
GLUCOSE BLD-MCNC: 102 MG/DL (ref 74–99)
GLUCOSE BLD-MCNC: 103 MG/DL (ref 74–99)
GLUCOSE BLD-MCNC: 119 MG/DL (ref 74–99)
GLUCOSE BLD-MCNC: 126 MG/DL (ref 74–99)
GLUCOSE BLD-MCNC: 147 MG/DL (ref 74–99)
GLUCOSE SERPL-MCNC: 95 MG/DL (ref 74–99)
GLUCOSE UR STRIP-MCNC: 250 MG/DL
HCT VFR BLD AUTO: 35.1 % (ref 34–48)
HGB BLD-MCNC: 11.3 G/DL (ref 11.5–15.5)
HGB UR QL STRIP.AUTO: ABNORMAL
KETONES UR STRIP-MCNC: NEGATIVE MG/DL
LEUKOCYTE ESTERASE UR QL STRIP: ABNORMAL
LYMPHOCYTES NFR BLD: 0.28 K/UL (ref 1.5–4)
LYMPHOCYTES RELATIVE PERCENT: 8 % (ref 20–42)
MAGNESIUM SERPL-MCNC: 1.7 MG/DL (ref 1.6–2.4)
MCH RBC QN AUTO: 27.1 PG (ref 26–35)
MCHC RBC AUTO-ENTMCNC: 32.2 G/DL (ref 32–34.5)
MCV RBC AUTO: 84.2 FL (ref 80–99.9)
MONOCYTES NFR BLD: 0.37 K/UL (ref 0.1–0.95)
MONOCYTES NFR BLD: 11 % (ref 2–12)
MYELOCYTES ABSOLUTE COUNT: 0.06 K/UL
MYELOCYTES: 2 %
NEUTROPHILS NFR BLD: 66 % (ref 43–80)
NEUTS SEG NFR BLD: 2.32 K/UL (ref 1.8–7.3)
NITRITE UR QL STRIP: NEGATIVE
PH UR STRIP: 8 [PH] (ref 5–8)
PHOSPHATE SERPL-MCNC: 4.3 MG/DL (ref 2.5–4.5)
PLATELET # BLD AUTO: 217 K/UL (ref 130–450)
PMV BLD AUTO: 12.2 FL (ref 7–12)
POTASSIUM SERPL-SCNC: 4.2 MMOL/L (ref 3.5–5.1)
PROT UR STRIP-MCNC: >=300 MG/DL
RBC # BLD AUTO: 4.17 M/UL (ref 3.5–5.5)
RBC # BLD: ABNORMAL 10*6/UL
RBC #/AREA URNS HPF: ABNORMAL /HPF
SODIUM SERPL-SCNC: 138 MMOL/L (ref 136–145)
SP GR UR STRIP: 1.01 (ref 1–1.03)
UROBILINOGEN UR STRIP-ACNC: 4 EU/DL (ref 0–1)
WBC #/AREA URNS HPF: ABNORMAL /HPF
WBC OTHER # BLD: 3.5 K/UL (ref 4.5–11.5)

## 2025-07-15 PROCEDURE — 97110 THERAPEUTIC EXERCISES: CPT

## 2025-07-15 PROCEDURE — 80048 BASIC METABOLIC PNL TOTAL CA: CPT

## 2025-07-15 PROCEDURE — 84100 ASSAY OF PHOSPHORUS: CPT

## 2025-07-15 PROCEDURE — 6370000000 HC RX 637 (ALT 250 FOR IP)

## 2025-07-15 PROCEDURE — 2580000003 HC RX 258: Performed by: INTERNAL MEDICINE

## 2025-07-15 PROCEDURE — 97530 THERAPEUTIC ACTIVITIES: CPT

## 2025-07-15 PROCEDURE — 90935 HEMODIALYSIS ONE EVALUATION: CPT

## 2025-07-15 PROCEDURE — 2500000003 HC RX 250 WO HCPCS: Performed by: INTERNAL MEDICINE

## 2025-07-15 PROCEDURE — 82962 GLUCOSE BLOOD TEST: CPT

## 2025-07-15 PROCEDURE — 85025 COMPLETE CBC W/AUTO DIFF WBC: CPT

## 2025-07-15 PROCEDURE — 36415 COLL VENOUS BLD VENIPUNCTURE: CPT

## 2025-07-15 PROCEDURE — 2060000000 HC ICU INTERMEDIATE R&B

## 2025-07-15 PROCEDURE — 83735 ASSAY OF MAGNESIUM: CPT

## 2025-07-15 PROCEDURE — 81001 URINALYSIS AUTO W/SCOPE: CPT

## 2025-07-15 PROCEDURE — 6370000000 HC RX 637 (ALT 250 FOR IP): Performed by: INTERNAL MEDICINE

## 2025-07-15 RX ORDER — DEXTROSE MONOHYDRATE AND SODIUM CHLORIDE 5; .9 G/100ML; G/100ML
INJECTION, SOLUTION INTRAVENOUS CONTINUOUS
Status: DISCONTINUED | OUTPATIENT
Start: 2025-07-15 | End: 2025-07-20

## 2025-07-15 RX ORDER — 0.9 % SODIUM CHLORIDE 0.9 %
1000 INTRAVENOUS SOLUTION INTRAVENOUS ONCE
Status: COMPLETED | OUTPATIENT
Start: 2025-07-15 | End: 2025-07-15

## 2025-07-15 RX ADMIN — METOPROLOL SUCCINATE 75 MG: 25 TABLET, EXTENDED RELEASE ORAL at 08:13

## 2025-07-15 RX ADMIN — ASPIRIN 81 MG: 81 TABLET, CHEWABLE ORAL at 08:27

## 2025-07-15 RX ADMIN — DEXTROSE AND SODIUM CHLORIDE: 5; .9 INJECTION, SOLUTION INTRAVENOUS at 16:27

## 2025-07-15 RX ADMIN — SODIUM BICARBONATE: 84 INJECTION, SOLUTION INTRAVENOUS at 03:00

## 2025-07-15 RX ADMIN — ATORVASTATIN CALCIUM 40 MG: 40 TABLET, FILM COATED ORAL at 08:14

## 2025-07-15 RX ADMIN — Medication 5 MG: at 21:59

## 2025-07-15 RX ADMIN — METHOCARBAMOL 750 MG: 500 TABLET ORAL at 21:59

## 2025-07-15 RX ADMIN — SODIUM CHLORIDE 1000 ML: 0.9 INJECTION, SOLUTION INTRAVENOUS at 18:24

## 2025-07-15 RX ADMIN — LEVOTHYROXINE SODIUM 50 MCG: 0.05 TABLET ORAL at 05:24

## 2025-07-15 RX ADMIN — CLOPIDOGREL BISULFATE 75 MG: 75 TABLET, FILM COATED ORAL at 08:26

## 2025-07-15 RX ADMIN — METOPROLOL SUCCINATE 75 MG: 25 TABLET, EXTENDED RELEASE ORAL at 20:28

## 2025-07-15 RX ADMIN — PANTOPRAZOLE SODIUM 40 MG: 40 TABLET, DELAYED RELEASE ORAL at 05:24

## 2025-07-15 RX ADMIN — HYDRALAZINE HYDROCHLORIDE 25 MG: 25 TABLET ORAL at 08:13

## 2025-07-15 RX ADMIN — HYDRALAZINE HYDROCHLORIDE 25 MG: 25 TABLET ORAL at 20:29

## 2025-07-15 ASSESSMENT — PAIN SCALES - GENERAL
PAINLEVEL_OUTOF10: 4
PAINLEVEL_OUTOF10: 0
PAINLEVEL_OUTOF10: 0

## 2025-07-15 NOTE — CONSULTS
[place] [id number]                              CONSULTATION      PATIENT NAME: DEANDRA FARMER              : 1955  MED REC NO: [id number]                        ROOM: [facility]  ACCOUNT NO: [id number]                       ADMIT DATE: 07/10/2025  PROVIDER: Jarod Goldsmith MD      CONSULT DATE: 2025    PROGRESS NOTE:    I spoke to her daughter this afternoon, and I gave her update about the clinical status of the patient.    The patient has right-sided paralysis.    We tried to do MRI but was unsuccessful because of the presence of ICP that is end of life.    My recommendation is to transfer the patient to tertiary care center as the patient needs further neuro evaluation.    The daughter was in agreement.  The daughter is flying from [place] to [place].  She is arriving there around 12 noon.    The patient had her ICD done at the [facility], and indeed she was supposed to have appointment there next month for followup with a plan to replace her ICD generator.    The case was discussed with Dr. Lombardo, the attending physician and he is going to start the transfer process for patient to be transferred to [facility].          JAROD GOLDSMITH MD      D:  2025 20:26:13     T:  2025 22:51:48     GLORIA/SUJATHA  Job #:  [id number]     Doc#:  [id number]

## 2025-07-15 NOTE — PROGRESS NOTES
Ron Ahumada MD  Nephrology    Hemodialysis/Progress note.    Patient seen and examined on hemodialyisis.  Chart reviewed.  Patient tolerated her first hemodialysis treatment yesterday rather well.  She has improved urinary output.    She is presently denying any shortness of breath.    Appetite is good.  No nausea or dysguesia.  Awake and alert . In no acute distress.    Vital SignsBP 133/74   Pulse 63   Temp 98.3 °F (36.8 °C)   Resp 20   Ht 1.549 m (5' 1\")   Wt 77.7 kg (171 lb 4.8 oz)   SpO2 97%   BMI 32.37 kg/m²   24HR INTAKE/OUTPUT:    Intake/Output Summary (Last 24 hours) at 7/15/2025 1354  Last data filed at 7/15/2025 0832  Gross per 24 hour   Intake 3031.43 ml   Output 3325 ml   Net -293.57 ml          Physical  Exam      Neck: No JVD.  Temporary hemodialysis catheter in place on the right.  Lungs: Breath sounds decreased at the bases. No rales or ronchi.  Heart: Regular rate and rhythm. No S3 gallop. No murmrur.  Abdomen: Soft non distended, non tender and normal bowel sounds.  Extremeties: No bipedal  edema.          Medications:  Current Facility-Administered Medications   Medication Dose Route Frequency Provider Last Rate Last Admin    sodium bicarbonate 150 mEq in dextrose 5 % 1,000 mL infusion   IntraVENous Continuous Alonso Schwab MD 75 mL/hr at 07/15/25 0445 Rate Verify at 07/15/25 0445    atorvastatin (LIPITOR) tablet 40 mg  40 mg Oral Daily Lisandra Do APRN - CNP   40 mg at 07/15/25 0814    hydrALAZINE (APRESOLINE) tablet 25 mg  25 mg Oral TID Lisandra Do APRN - CNP   25 mg at 07/15/25 0813    methocarbamol (ROBAXIN) tablet 750 mg  750 mg Oral 4x Daily PRN Lisandra Do APRN - CNP        metoprolol succinate (TOPROL XL) extended release tablet 75 mg  75 mg Oral BID Lisandra Do APRN - CNP   75 mg at 07/15/25 0813    hydrALAZINE (APRESOLINE) injection 5 mg  5 mg IntraVENous Q4H PRN Lisandra Do APRN - CNP        labetalol (NORMODYNE;TRANDATE) injection 5 mg  5

## 2025-07-15 NOTE — PROGRESS NOTES
OCCUPATIONAL THERAPY BEDSIDE TREATMENT NOTE  [facility]  [address]. [place]    Date:7/15/2025  Patient Name: Sbarina Bajwa  MRN: [id number]  : 1955  Room: [address] [place] [address]       Evaluating OT: Kevin Quijano OTR/L; #[id number]      Referring Provider and Specific Provider Orders/Date:      07/10/25 2115   OT eval and treat  Start:  07/10/25 2115,   End:  07/10/25 2115,   ONE TIME,   Standing Count:  1 Occurrences,   R         Hi, Lisandra L, APRN - CNP         Placement Recommendation: Acute Rehab vs subacute rehab        Diagnosis:   1. Altered mental status, unspecified altered mental status type    2. Chronic renal impairment, stage 3 (moderate), unspecified whether stage 3a or 3b CKD (HCC)    3. Elevated troponin    4. Acute CVA (cerebrovascular accident) (HCC)         Surgery: None       Pertinent Medical History:       Past Medical History        Past Medical History:   Diagnosis Date    Arthritis      Asthma      Diabetes mellitus (HCC)      GERD (gastroesophageal reflux disease)      Hyperlipidemia      Hypertension      Immune deficiency disorder      MI, old     Microalbuminuria due to type 2 diabetes mellitus (HCC) 2016    Mitral valve regurgitation      Vitamin D deficiency 2016            Past Surgical History         Past Surgical History:   Procedure Laterality Date    BREAST SURGERY         reduction    lumpectomy    CHOLECYSTECTOMY, LAPAROSCOPIC N/A 2022     LAPAROSCOPIC CHOLECYSTECTOMY performed by Ernie Medina MD at [facility]    COLONOSCOPY        FRACTURE SURGERY         right thumb and tailbone    HYSTERECTOMY (CERVIX STATUS UNKNOWN)        PACEMAKER PLACEMENT        defibrillator at [facility]     UPPER GASTROINTESTINAL ENDOSCOPY N/A 2021     EGD ESOPHAGOGASTRODUODENOSCOPY ULTRASOUND performed by Ernie Medina MD at [facility] OR            Precautions:  Fall Risk, R UE and LE Flaccid,

## 2025-07-15 NOTE — DIALYSIS
15:15 - Spoke with JOAN Javier, sodium bicarb gtt is discontinued - Stopped gtt, flushed and capped per hospital protocol.

## 2025-07-15 NOTE — FLOWSHEET NOTE
07/15/25 1545   Vital Signs   /85   Temp 98.3 °F (36.8 °C)   Pulse 71   Respirations 20   Weight - Scale 76.8 kg (169 lb 5 oz)   Weight Method Estimated   Percent Weight Change -1.16   Pain Assessment   Pain Assessment None - Denies Pain   Post-Hemodialysis Assessment   Post-Treatment Procedures Blood returned;Catheter capped, clamped and heparinized x 2 ports   Machine Disinfection Process Acid/Vinegar Clean;Verified Absence of Bleach in HCO3 Jug;Heat Disinfect;Machine Absence of Bleach Machine;Exterior Machine Disinfection;Bicarb Jug Disinfection   Rinseback Volume (ml) 300 ml   Blood Volume Processed (Liters) 50.7 L   Dialyzer Clearance Lightly streaked   Duration of Treatment (minutes) 180 minutes   Hemodialysis Intake (ml) 300 ml   Hemodialysis Output (ml) 1300 ml   NET Removed (ml) 1000   Tolerated Treatment Good   Patient Response to Treatment Net -1000mL off with 2nd dialysis treatment today.  Vitals stable throughout.  HD ports x's 2 flushed, heparinized and capped per policy.  No issues.   Bilateral Breath Sounds Diminished   Edema Right lower extremity;Left lower extremity   RLE Edema +1   LLE Edema +1   Time Off 1526   Patient Disposition Return to room   Observations & Evaluations   Level of Consciousness 0   Oriented X 3   Heart Rhythm Regular   Respiratory Quality/Effort Unlabored   O2 Device None (Room air)   Skin Condition/Temp Dry   Abdomen Inspection Soft;Rounded   Bowel Sounds (All Quadrants) Active

## 2025-07-15 NOTE — PROGRESS NOTES
Cardiology  Progress Note      SUBJECTIVE: Patient was alert and awake this morning.  She was able to answer questions.  She still has significant weakness in her right arm and right leg.  No acute distress    Current Inpatient Medications  Current Facility-Administered Medications: sodium bicarbonate 150 mEq in dextrose 5 % 1,000 mL infusion, , IntraVENous, Continuous  atorvastatin (LIPITOR) tablet 40 mg, 40 mg, Oral, Daily  hydrALAZINE (APRESOLINE) tablet 25 mg, 25 mg, Oral, TID  methocarbamol (ROBAXIN) tablet 750 mg, 750 mg, Oral, 4x Daily PRN  metoprolol succinate (TOPROL XL) extended release tablet 75 mg, 75 mg, Oral, BID  hydrALAZINE (APRESOLINE) injection 5 mg, 5 mg, IntraVENous, Q4H PRN  labetalol (NORMODYNE;TRANDATE) injection 5 mg, 5 mg, IntraVENous, Q4H PRN  sulfur hexafluoride microspheres (LUMASON) 60.7-25 MG injection 2 mL, 2 mL, IntraVENous, ONCE PRN  pantoprazole (PROTONIX) tablet 40 mg, 40 mg, Oral, QAM AC  acetaminophen (TYLENOL) tablet 650 mg, 650 mg, Oral, Q6H PRN  prochlorperazine (COMPAZINE) injection 10 mg, 10 mg, IntraVENous, Q6H PRN  melatonin disintegrating tablet 5 mg, 5 mg, Oral, Nightly PRN  [Held by provider] aspirin chewable tablet 81 mg, 81 mg, Oral, Daily  [Held by provider] clopidogrel (PLAVIX) tablet 75 mg, 75 mg, Oral, Daily  insulin lispro (HUMALOG,ADMELOG) injection vial 0-8 Units, 0-8 Units, SubCUTAneous, 4x Daily AC & HS  glucose chewable tablet 16 g, 4 tablet, Oral, PRN  dextrose bolus 10% 125 mL, 125 mL, IntraVENous, PRN **OR** dextrose bolus 10% 250 mL, 250 mL, IntraVENous, PRN  glucagon injection 1 mg, 1 mg, SubCUTAneous, PRN  dextrose 10 % infusion, , IntraVENous, Continuous PRN  levothyroxine (SYNTHROID) tablet 50 mcg, 50 mcg, Oral, Daily      Physical  VITALS:  BP (!) 122/58   Pulse 60   Temp 98 °F (36.7 °C)   Resp 18   Ht 1.549 m (5' 1\")   Wt 77.7 kg (171 lb 4.8 oz)   SpO2 96%   BMI 32.37 kg/m²   CURRENT TEMPERATURE:  Temp: 98 °F (36.7 °C)  CONSTITUTIONAL: No  acute distress.  EYES: Vision is intact.  ENT: No sore throat.  No ear drainage.  NECK: No JVD.  BACK: Symmetric.  LUNGS:  diminished breath sounds left base  CARDIOVASCULAR:  normal S1 and S2, no S3, and no S4  ABDOMEN:  non-tender  NEUROLOGIC: Right-sided hemiplegia  EXTREMITIES: No edema cyanosis or clubbing.    DATA:        Cardiology Labs:    Lab Results   Component Value Date/Time     07/14/2025 08:08 AM    K 4.1 07/14/2025 08:08 AM    K 5.0 04/13/2022 07:00 AM     07/14/2025 08:08 AM    CO2 18 07/14/2025 08:08 AM    BUN 72 07/14/2025 08:08 AM    CREATININE 6.4 07/14/2025 08:08 AM    GLUCOSE 124 07/14/2025 08:08 AM    CALCIUM 8.3 07/14/2025 08:08 AM    LABGLOM 7 07/14/2025 08:08 AM      CBC:    Recent Labs     07/14/25  0808 07/13/25  0818 07/12/25  0700   WBC 4.0* 4.7 4.4*   HGB 11.4* 11.5 11.8   HCT 35.6 35.8 36.6   MCV 84.8 85.6 84.7    248 295     TROPONIN:    Lab Results   Component Value Date    CKTOTAL 430 (H) 08/30/2024    TROPHS 93 (H) 07/11/2025       ASSESSMENT & PLAN:      Altered mental status  Symptoms of stroke  Cardiomyopathy  Status post biventricular ICD  Elevated troponin level  Acute on chronic renal failure.  Hypertension  Hyperlipidemia  Type 2 diabetes    Patient presented with altered mental status .    CTA of the neck showed occlusion of the A3 segment of the left anterior cerebral artery.    From the cardiac standpoint she has problem with nonischemic cardiomyopathy with biventricular ICD inserted in the past    Echocardiogram is showing severely reduced left ventricular systolic function with moderate pulmonary hypertension.  No evidence of shunt through the atrial septum by bubble injection.    EKG is showing sinus rhythm with paced ventricular activity.      She is on dual antiplatelet therapy.    Patient had downtrending troponin elevation starting at 102 going down to 99 and then 93.  EKG is nondiagnostic due to the presence of paced ventricular activity.  This

## 2025-07-15 NOTE — PROGRESS NOTES
Ron Ahumada MD  Nephrology    Hemodialysis/Progress note.    Patient seen and examined on hemodialyisis.  Chart reviewed.  Medications reviewed.  Patient had hemodialysis catheter placed earlier today.  Appetite is fair.  No shortness of breath.  Appetite improving.  No nausea or dysguesia.  Awake and alert . In no acute distress.    Vital SignsBP 134/68   Pulse 66   Temp 98.6 °F (37 °C)   Resp 18   Ht 1.549 m (5' 1\")   Wt 82.1 kg (181 lb)   SpO2 95%   BMI 34.20 kg/m²   24HR INTAKE/OUTPUT:    Intake/Output Summary (Last 24 hours) at 7/15/2025 1401  Last data filed at 7/15/2025 0832  Gross per 24 hour   Intake 3031.43 ml   Output 3325 ml   Net -293.57 ml          Physical  Exam    .  Neck: No JVD.  Hemodialysis catheter in place on the right.  Lungs: Breath sounds decreased at the bases. No rales or ronchi.  Heart: Regular rate and rhythm. No S3 gallop. No murmrur.  Abdomen: Soft non distended, non tender and normal bowel sounds.  Extremeties: No bipedal  edema.        Medications:  Current Facility-Administered Medications   Medication Dose Route Frequency Provider Last Rate Last Admin    sodium bicarbonate 150 mEq in dextrose 5 % 1,000 mL infusion   IntraVENous Continuous Alonso Schwab MD 75 mL/hr at 07/14/25 1432 New Bag at 07/14/25 1432    atorvastatin (LIPITOR) tablet 40 mg  40 mg Oral Daily Lisandra Do APRN - CNP   40 mg at 07/14/25 1811    hydrALAZINE (APRESOLINE) tablet 25 mg  25 mg Oral TID Lisandra Do APRN - CNP   25 mg at 07/14/25 2018    methocarbamol (ROBAXIN) tablet 750 mg  750 mg Oral 4x Daily PRN Lisandra Do APRN - CNP        metoprolol succinate (TOPROL XL) extended release tablet 75 mg  75 mg Oral BID Lisandra Do APRN - CNP   75 mg at 07/14/25 2018    hydrALAZINE (APRESOLINE) injection 5 mg  5 mg IntraVENous Q4H PRN Lisandra Do APRN - CNP        labetalol (NORMODYNE;TRANDATE) injection 5 mg  5 mg IntraVENous Q4H PRN Lisandra Do APRN - CNP

## 2025-07-15 NOTE — PROGRESS NOTES
Dr Mcmullen called 2030 he talked with pt daughter and she wants transfer to [facility] known to them pacer and because of her decline. Lisandra MEJÍA notified after several attempts of daughters request to transfer pt to [facility] . And she is initiating transfer information.

## 2025-07-15 NOTE — CARE COORDINATION
SW/Care Manager Note: Pt admitted for cerebral artery occlusion. Room air. IV sodium bicarbonate. Per IDR plan is for pt to transfer to [facility]; Dr. Laureano has initiated transfer; accepted & awaiting bed availability. Dialysis was started yesterday evening. Met with pt @ bedside & tc w/ [place]/Dtr for coordination of care. Both in agreement w/ current transfer plan. Pt A&O w/ expressive aphasia & rt side flaccid. Pta pt lived [facility] & independent with all adl's. AR being considered upon hospital d/c, but will be further determined at [facility]. Dtr lives in [place] & flying in today.   Dr. Rojas is pcp & Nina Watt on [address] in [place] is pharmacy. CM following. Electronically signed by AGUEDA Jerome on 7/15/2025 at 12:13 PM

## 2025-07-15 NOTE — PROGRESS NOTES
Physical Therapy   Treatment Note/Plan of Care    Room #:  0533/0533-02  Patient Name: Sabrina Bajwa  YOB: 1955  MRN: [id number]    Date of Service: 7/15/2025     Tentative placement recommendation: Acute Rehab with pt's daughter in agreement  Equipment recommendation: To be determined      Evaluating Physical Therapist: Lula Dickerson, PT #[id number]      Specific Provider Orders/Date/Referring PrPT eval and treat  Start:  07/10/25 2115,   End:  07/10/25 2115,   ONE TIME,   Standing Count:  1 Occurrences,   R       Lisandra Do, APRN - CNPovider :     Admitting Diagnosis:   Cerebral artery occlusion [I66.9]  Elevated troponin [R79.89]  Acute CVA (cerebrovascular accident) (HCC) [I63.9]  Altered mental status, unspecified altered mental status type [R41.82]  Chronic renal impairment, stage 3 (moderate), unspecified whether stage 3a or 3b CKD (HCC) [N18.30]    Pt is a 70 y.o. female adm 7/11/25 with acute change in mental status and R weakness Dx with cerebral artery occlusion. No MRI due to pacemaker insertion.       Visit Diagnoses         Codes      Altered mental status, unspecified altered mental status type    -  Primary R41.82      Chronic renal impairment, stage 3 (moderate), unspecified whether stage 3a or 3b CKD (HCC)     N18.30      Elevated troponin     R79.89            Patient Active Problem List   Diagnosis    Diabetes mellitus (HCC)    Hypertension    Asthma    Hyperlipidemia    Vitamin D deficiency    Microalbuminuria due to type 2 diabetes mellitus (HCC)    Enteritis    Intractable vomiting    Dilated cbd, acquired    Cerebral artery occlusion    Acute CVA (cerebrovascular accident) (HCC)    Acute encephalopathy    Right arm weakness    Asymptomatic occlusion of anterior cerebral artery        ASSESSMENT of Current Deficits Patient exhibits receptive & expressive aphasia with decreased strength, balance, and endurance impairing bed mobility, sitting balance, transfers, gait , and tolerance

## 2025-07-15 NOTE — PROGRESS NOTES
Family took clothing home and flowers for pt  she kept her cell phone and . Looked for but did not find with pt or belongings a bracelet plastic coil with keys on it.

## 2025-07-15 NOTE — ACP (ADVANCE CARE PLANNING)
Advance Care Planning   Healthcare Decision Maker:    Primary Decision Maker: Selma Bajwa - [facility] - [phone]    Click here to complete Healthcare Decision Makers including selection of the Healthcare Decision Maker Relationship (ie \"Primary\").  Today we documented Decision Maker(s) consistent with Legal Next of Kin hierarchy.

## 2025-07-15 NOTE — PLAN OF CARE
Problem: Chronic Conditions and Co-morbidities  Goal: Patient's chronic conditions and co-morbidity symptoms are monitored and maintained or improved  7/14/2025 2229 by Radha Kelly RN  Outcome: Progressing  7/14/2025 1024 by Maisha Bains RN  Outcome: Progressing  7/14/2025 1020 by Maisha Bains RN  Outcome: Progressing     Problem: Discharge Planning  Goal: Discharge to home or other facility with appropriate resources  7/14/2025 2229 by Radha Kelly RN  Outcome: Progressing  7/14/2025 1024 by Maisha Bains RN  Outcome: Progressing  7/14/2025 1020 by Maisha Bains RN  Outcome: Progressing     Problem: Safety - Adult  Goal: Free from fall injury  7/14/2025 2229 by Radha Kelly RN  Outcome: Progressing  7/14/2025 1024 by Maisha Bains RN  Outcome: Progressing  7/14/2025 1020 by Maisha Bains RN  Outcome: Progressing     Problem: Skin/Tissue Integrity  Goal: Skin integrity remains intact  Description: 1.  Monitor for areas of redness and/or skin breakdown  2.  Assess vascular access sites hourly  3.  Every 4-6 hours minimum:  Change oxygen saturation probe site  4.  Every 4-6 hours:  If on nasal continuous positive airway pressure, respiratory therapy assess nares and determine need for appliance change or resting period  7/14/2025 2229 by Radha Kelly RN  Outcome: Progressing  7/14/2025 1024 by Maihsa Bains RN  Outcome: Progressing  7/14/2025 1020 by Maisha Bains RN  Outcome: Progressing     Problem: ABCDS Injury Assessment  Goal: Absence of physical injury  7/14/2025 2229 by Radha Kelly RN  Outcome: Progressing  7/14/2025 1024 by Maisha Bains RN  Outcome: Progressing  7/14/2025 1020 by Maisha Bains RN  Outcome: Progressing     Problem: Pain  Goal: Verbalizes/displays adequate comfort level or baseline comfort level  Outcome: Progressing  Flowsheets (Taken 7/14/2025 1805 by Concepcoin Barbosa RN)  Verbalizes/displays adequate comfort level or baseline

## 2025-07-15 NOTE — PROGRESS NOTES
Internal Medicine Consult Note    [facility]    Vishal Jarrett D.O., VICI.                    Cecil Laureano D.O., SANDRA Merida D.O.     Mahamed Kebede, MSN, APRN-CNP  Yusuf Grijalva, MSN, APRN-CNP  Lisandra Do, MSN. APRN-NP-C  Linda Kim MSN, APRN, ACNP-AG     Primary Care Physician: Jesse Rojas DO   Admitting Physician:  Vishal Jarrett DO  Admission date and time: 7/10/2025 12:36 PM    Room:  [address] [place] [address]  Admitting diagnosis: Cerebral artery occlusion [I66.9]  Elevated troponin [R79.89]  Acute CVA (cerebrovascular accident) (HCC) [I63.9]  Altered mental status, unspecified altered mental status type [R41.82]  Chronic renal impairment, stage 3 (moderate), unspecified whether stage 3a or 3b CKD (HCC) [N18.30]    Patient Name: Sabrina Bajwa  MRN: [id number]    Date of Service: 7/15/2025     Subjective:  Sabrina is a 70 y.o. female who was seen and examined today,7/15/2025, at the bedside.  Sabrina is seen and examined in her room today.  She is more tired today following the initiation of dialysis last evening.  Tolerated dialysis catheter placement well.  Dr. Laureano and Dr. Mcmullen spoke about the patient's condition, the issues with the MRI and ICD device compatibility at this facility, and the need for transfer.  Transfer line was accessed and the patient was accepted in transfer by internal medicine at [facility] Dr. Aguero.  There is no bed availability presently and we are awaiting bed presently.  Per nursing the patient continues to have expressive aphasia and flaccid right side.  Patient has limited interaction verbally secondary to this but comprehension seems to be intact and she follows the examiner when spoken to as such. She voices no new symptoms or concerns.  There is no family present during my examination.    Review of System: Limited  Constitutional:   Does not report fever or chills, weight loss or gain,

## 2025-07-15 NOTE — PLAN OF CARE
Problem: Chronic Conditions and Co-morbidities  Goal: Patient's chronic conditions and co-morbidity symptoms are monitored and maintained or improved  Outcome: Progressing     Problem: Discharge Planning  Goal: Discharge to home or other facility with appropriate resources  Outcome: Progressing     Problem: Safety - Adult  Goal: Free from fall injury  Outcome: Progressing  Flowsheets (Taken 7/15/2025 0809)  Free From Fall Injury: Instruct family/caregiver on patient safety     Problem: Skin/Tissue Integrity  Goal: Skin integrity remains intact  Description: 1.  Monitor for areas of redness and/or skin breakdown  2.  Assess vascular access sites hourly  3.  Every 4-6 hours minimum:  Change oxygen saturation probe site  4.  Every 4-6 hours:  If on nasal continuous positive airway pressure, respiratory therapy assess nares and determine need for appliance change or resting period  Outcome: Progressing  Flowsheets (Taken 7/15/2025 0809)  Skin Integrity Remains Intact:   Monitor for areas of redness and/or skin breakdown   Assess vascular access sites hourly   Turn and reposition as indicated   Assess need for specialty bed   Positioning devices   Monitor skin under medical devices     Problem: ABCDS Injury Assessment  Goal: Absence of physical injury  Outcome: Progressing  Flowsheets (Taken 7/15/2025 0809)  Absence of Physical Injury: Implement safety measures based on patient assessment     Problem: Pain  Goal: Verbalizes/displays adequate comfort level or baseline comfort level  Outcome: Progressing  Flowsheets (Taken 7/15/2025 0809)  Verbalizes/displays adequate comfort level or baseline comfort level: Encourage patient to monitor pain and request assistance

## 2025-07-16 LAB
ANION GAP SERPL CALCULATED.3IONS-SCNC: 11 MMOL/L (ref 7–16)
BASOPHILS # BLD: 0.05 K/UL (ref 0–0.2)
BASOPHILS NFR BLD: 1 % (ref 0–2)
BUN SERPL-MCNC: 24 MG/DL (ref 8–23)
CALCIUM SERPL-MCNC: 7.6 MG/DL (ref 8.8–10.2)
CHLORIDE SERPL-SCNC: 101 MMOL/L (ref 98–107)
CO2 SERPL-SCNC: 24 MMOL/L (ref 22–29)
CREAT SERPL-MCNC: 3.2 MG/DL (ref 0.5–1)
EOSINOPHIL # BLD: 0.41 K/UL (ref 0.05–0.5)
EOSINOPHILS RELATIVE PERCENT: 11 % (ref 0–6)
ERYTHROCYTE [DISTWIDTH] IN BLOOD BY AUTOMATED COUNT: 15.9 % (ref 11.5–15)
FERRITIN SERPL-MCNC: 190 NG/ML
GFR, ESTIMATED: 15 ML/MIN/1.73M2
GLUCOSE BLD-MCNC: 170 MG/DL (ref 74–99)
GLUCOSE BLD-MCNC: 188 MG/DL (ref 74–99)
GLUCOSE BLD-MCNC: 192 MG/DL (ref 74–99)
GLUCOSE BLD-MCNC: 241 MG/DL (ref 74–99)
GLUCOSE SERPL-MCNC: 176 MG/DL (ref 74–99)
HCT VFR BLD AUTO: 35.2 % (ref 34–48)
HGB BLD-MCNC: 11.2 G/DL (ref 11.5–15.5)
IMM GRANULOCYTES # BLD AUTO: <0.03 K/UL (ref 0–0.58)
IMM GRANULOCYTES NFR BLD: 0 % (ref 0–5)
LYMPHOCYTES NFR BLD: 0.77 K/UL (ref 1.5–4)
LYMPHOCYTES RELATIVE PERCENT: 20 % (ref 20–42)
MAGNESIUM SERPL-MCNC: 1.4 MG/DL (ref 1.6–2.4)
MCH RBC QN AUTO: 27.3 PG (ref 26–35)
MCHC RBC AUTO-ENTMCNC: 31.8 G/DL (ref 32–34.5)
MCV RBC AUTO: 85.9 FL (ref 80–99.9)
MONOCYTES NFR BLD: 0.57 K/UL (ref 0.1–0.95)
MONOCYTES NFR BLD: 15 % (ref 2–12)
NEUTROPHILS NFR BLD: 52 % (ref 43–80)
NEUTS SEG NFR BLD: 1.98 K/UL (ref 1.8–7.3)
PHOSPHATE SERPL-MCNC: 3 MG/DL (ref 2.5–4.5)
PLATELET # BLD AUTO: 183 K/UL (ref 130–450)
PMV BLD AUTO: 12.1 FL (ref 7–12)
POTASSIUM SERPL-SCNC: 4.1 MMOL/L (ref 3.5–5.1)
PTH-INTACT SERPL-MCNC: 457 PG/ML (ref 15–65)
RBC # BLD AUTO: 4.1 M/UL (ref 3.5–5.5)
SODIUM SERPL-SCNC: 136 MMOL/L (ref 136–145)
URATE SERPL-MCNC: 3.7 MG/DL (ref 2.4–5.7)
WBC OTHER # BLD: 3.8 K/UL (ref 4.5–11.5)

## 2025-07-16 PROCEDURE — 6370000000 HC RX 637 (ALT 250 FOR IP)

## 2025-07-16 PROCEDURE — 80048 BASIC METABOLIC PNL TOTAL CA: CPT

## 2025-07-16 PROCEDURE — 82962 GLUCOSE BLOOD TEST: CPT

## 2025-07-16 PROCEDURE — 97110 THERAPEUTIC EXERCISES: CPT

## 2025-07-16 PROCEDURE — 6370000000 HC RX 637 (ALT 250 FOR IP): Performed by: INTERNAL MEDICINE

## 2025-07-16 PROCEDURE — 83970 ASSAY OF PARATHORMONE: CPT

## 2025-07-16 PROCEDURE — 2580000003 HC RX 258: Performed by: INTERNAL MEDICINE

## 2025-07-16 PROCEDURE — 85025 COMPLETE CBC W/AUTO DIFF WBC: CPT

## 2025-07-16 PROCEDURE — 97535 SELF CARE MNGMENT TRAINING: CPT

## 2025-07-16 PROCEDURE — 36415 COLL VENOUS BLD VENIPUNCTURE: CPT

## 2025-07-16 PROCEDURE — 2060000000 HC ICU INTERMEDIATE R&B

## 2025-07-16 PROCEDURE — 82728 ASSAY OF FERRITIN: CPT

## 2025-07-16 PROCEDURE — 83735 ASSAY OF MAGNESIUM: CPT

## 2025-07-16 PROCEDURE — 84550 ASSAY OF BLOOD/URIC ACID: CPT

## 2025-07-16 PROCEDURE — 97530 THERAPEUTIC ACTIVITIES: CPT

## 2025-07-16 PROCEDURE — 84100 ASSAY OF PHOSPHORUS: CPT

## 2025-07-16 RX ADMIN — PANTOPRAZOLE SODIUM 40 MG: 40 TABLET, DELAYED RELEASE ORAL at 06:02

## 2025-07-16 RX ADMIN — LEVOTHYROXINE SODIUM 50 MCG: 0.05 TABLET ORAL at 06:02

## 2025-07-16 RX ADMIN — INSULIN LISPRO 2 UNITS: 100 INJECTION, SOLUTION INTRAVENOUS; SUBCUTANEOUS at 18:14

## 2025-07-16 RX ADMIN — INSULIN LISPRO 2 UNITS: 100 INJECTION, SOLUTION INTRAVENOUS; SUBCUTANEOUS at 12:36

## 2025-07-16 RX ADMIN — METOPROLOL SUCCINATE 75 MG: 25 TABLET, EXTENDED RELEASE ORAL at 08:50

## 2025-07-16 RX ADMIN — ASPIRIN 81 MG: 81 TABLET, CHEWABLE ORAL at 08:50

## 2025-07-16 RX ADMIN — ATORVASTATIN CALCIUM 40 MG: 40 TABLET, FILM COATED ORAL at 08:50

## 2025-07-16 RX ADMIN — INSULIN LISPRO 2 UNITS: 100 INJECTION, SOLUTION INTRAVENOUS; SUBCUTANEOUS at 08:51

## 2025-07-16 RX ADMIN — HYDRALAZINE HYDROCHLORIDE 25 MG: 25 TABLET ORAL at 21:13

## 2025-07-16 RX ADMIN — DEXTROSE AND SODIUM CHLORIDE: 5; .9 INJECTION, SOLUTION INTRAVENOUS at 18:47

## 2025-07-16 RX ADMIN — HYDRALAZINE HYDROCHLORIDE 25 MG: 25 TABLET ORAL at 08:49

## 2025-07-16 RX ADMIN — CLOPIDOGREL BISULFATE 75 MG: 75 TABLET, FILM COATED ORAL at 08:50

## 2025-07-16 RX ADMIN — METOPROLOL SUCCINATE 75 MG: 25 TABLET, EXTENDED RELEASE ORAL at 21:14

## 2025-07-16 ASSESSMENT — PAIN SCALES - GENERAL: PAINLEVEL_OUTOF10: 0

## 2025-07-16 NOTE — PROGRESS NOTES
Ron Ahumada MD  Nephrology    Progress note.    Patient seen and examined.  Chart reviewed.  Dr. Laureano's notes reviewed.  Patient's daughter is present at the bedside.  Patient has no specific complaints.  She has improved urinary output.    She is presently denying any shortness of breath.    Appetite is fair.  No nausea or dysguesia.  Awake and alert . In no acute distress.    Vital SignsBP 119/64   Pulse 62   Temp 97.7 °F (36.5 °C) (Oral)   Resp 14   Ht 1.549 m (5' 1\")   Wt 87 kg (191 lb 12.8 oz)   SpO2 94%   BMI 36.24 kg/m²   24HR INTAKE/OUTPUT:    Intake/Output Summary (Last 24 hours) at 7/16/2025 1257  Last data filed at 7/16/2025 0508  Gross per 24 hour   Intake 300 ml   Output 2970 ml   Net -2670 ml          Physical  Exam      Neck: No JVD.  Temporary hemodialysis catheter in place on the right.  Lungs: Breath sounds decreased at the bases. No rales or ronchi.  Heart: Regular rate and rhythm. No S3 gallop. No murmrur.  Abdomen: Soft non distended, non tender and normal bowel sounds.  Extremeties: No bipedal  edema.          Medications:  Current Facility-Administered Medications   Medication Dose Route Frequency Provider Last Rate Last Admin    dextrose 5 % and 0.9 % sodium chloride infusion   IntraVENous Continuous Ron Ahumada MD 80 mL/hr at 07/15/25 1940 Restarted at 07/15/25 1940    atorvastatin (LIPITOR) tablet 40 mg  40 mg Oral Daily Lisandra Do APRN - CNP   40 mg at 07/16/25 0850    hydrALAZINE (APRESOLINE) tablet 25 mg  25 mg Oral TID Lisandra Do APRN - CNP   25 mg at 07/16/25 0849    methocarbamol (ROBAXIN) tablet 750 mg  750 mg Oral 4x Daily PRN Lisandra Do APRN - CNP   750 mg at 07/15/25 2159    metoprolol succinate (TOPROL XL) extended release tablet 75 mg  75 mg Oral BID Lisandra Do APRN - CNP   75 mg at 07/16/25 0850    hydrALAZINE (APRESOLINE) injection 5 mg  5 mg IntraVENous Q4H PRN Lisandra Do APRN - CNP        labetalol (NORMODYNE;TRANDATE)

## 2025-07-16 NOTE — PROGRESS NOTES
Internal Medicine Consult Note    [facility]    Vishal Jarrett D.O., VICI.                    Cecil Laureano D.O., VICI.                             Chip Merida D.O.     Mahamed Kebede, MSN, APRN-CNP  Yusuf Grijalva, MSN, APRN-CNP  Lisandra Do, MSN. APRN-NP-C  Linda Kim MSN, APRN, ACNP-AG     Primary Care Physician: Jesse Rojas DO   Admitting Physician:  Vishal Jarrett DO  Admission date and time: 7/10/2025 12:36 PM    Room:  [address] [place] [address]  Admitting diagnosis: Cerebral artery occlusion [I66.9]  Elevated troponin [R79.89]  Acute CVA (cerebrovascular accident) (HCC) [I63.9]  Altered mental status, unspecified altered mental status type [R41.82]  Chronic renal impairment, stage 3 (moderate), unspecified whether stage 3a or 3b CKD (HCC) [N18.30]    Patient Name: Sabrina Bajwa  MRN: [id number]    Date of Service: 7/16/2025     Subjective:  Sabrina is a 70 y.o. female who was seen and examined today,7/16/2025, at the bedside.  Sabrina was evaluated in the presence of her daughter who [place].  I spent a great deal of time discussing laboratory and radiographic results as well as recommendations from those consultants involved in the patient's care.  The patient's daughter is fixated on undergoing MRI which we cannot do here as the patient's ICD is end-of-life battery.  I have made arrangements for transfer to [facility] but there remains no bed availability.  I have since spoken to the access center who has checked availability at alternate tertiary facilities including [facility], [facility], [facility], and [facility].  There is no current bed availability with current capacity and discharge dependent transfers.  I have reiterated to the patient's daughter that the neurologist I spoke to in [place] does not recommend any alternative treatment than what we are providing here.  She continues to require extensive work with the therapy teams.  She has

## 2025-07-16 NOTE — PROGRESS NOTES
Cardiology  Progress Note      SUBJECTIVE: Alert to time place and person.  Able to answer simple questions.  Still has no movement of the right arm and right leg.  No acute distress    Current Inpatient Medications  Current Facility-Administered Medications: dextrose 5 % and 0.9 % sodium chloride infusion, , IntraVENous, Continuous  atorvastatin (LIPITOR) tablet 40 mg, 40 mg, Oral, Daily  hydrALAZINE (APRESOLINE) tablet 25 mg, 25 mg, Oral, TID  methocarbamol (ROBAXIN) tablet 750 mg, 750 mg, Oral, 4x Daily PRN  metoprolol succinate (TOPROL XL) extended release tablet 75 mg, 75 mg, Oral, BID  hydrALAZINE (APRESOLINE) injection 5 mg, 5 mg, IntraVENous, Q4H PRN  labetalol (NORMODYNE;TRANDATE) injection 5 mg, 5 mg, IntraVENous, Q4H PRN  sulfur hexafluoride microspheres (LUMASON) 60.7-25 MG injection 2 mL, 2 mL, IntraVENous, ONCE PRN  pantoprazole (PROTONIX) tablet 40 mg, 40 mg, Oral, QAM AC  acetaminophen (TYLENOL) tablet 650 mg, 650 mg, Oral, Q6H PRN  prochlorperazine (COMPAZINE) injection 10 mg, 10 mg, IntraVENous, Q6H PRN  melatonin disintegrating tablet 5 mg, 5 mg, Oral, Nightly PRN  aspirin chewable tablet 81 mg, 81 mg, Oral, Daily  clopidogrel (PLAVIX) tablet 75 mg, 75 mg, Oral, Daily  insulin lispro (HUMALOG,ADMELOG) injection vial 0-8 Units, 0-8 Units, SubCUTAneous, 4x Daily AC & HS  glucose chewable tablet 16 g, 4 tablet, Oral, PRN  dextrose bolus 10% 125 mL, 125 mL, IntraVENous, PRN **OR** dextrose bolus 10% 250 mL, 250 mL, IntraVENous, PRN  glucagon injection 1 mg, 1 mg, SubCUTAneous, PRN  dextrose 10 % infusion, , IntraVENous, Continuous PRN  levothyroxine (SYNTHROID) tablet 50 mcg, 50 mcg, Oral, Daily      Physical  VITALS:  /83   Pulse 71   Temp 98.2 °F (36.8 °C) (Oral)   Resp 16   Ht 1.549 m (5' 1\")   Wt 87 kg (191 lb 12.8 oz)   SpO2 98%   BMI 36.24 kg/m²   CURRENT TEMPERATURE:  Temp: 98.2 °F (36.8 °C)  CONSTITUTIONAL: No acute distress.  EYES: Vision is intact.  ENT: No sore throat.  No ear  ischemia from her neuro event with type II non-STEMI .I will hold on coronary workup for now    Creatinine was around 3 on admission.  Entresto was stopped.  Entresto was stopped     Creatinine kept rising.  It is up almost to 6.4.     Patient was started on hemodialysis.  Creatinine is down to around 3.5    Mental status did deteriorate after admission.  Patient was able to squeeze my fingers with both hands when I saw her down in ER the day of admit even though she felt weak all over her body.  Patient now cannot move her right arm or right leg today.  She had total right-sided paralysis    I checked her ICD and it appears to be MRI compatible and I recommend MRI of the brain    The patient had this ICD few years ago at the [facility] and patient is running into end of battery life and indeed she was supposed to be seen at the [facility] next month with the plan to replace her ICD generator.    The department of MRI in this hospital did not feel comfortable doing MRI of the brain in view of all above.    I called the daughter the evening of July 14 and I discussed all above with her.  I felt patient needed to be transferred to tertiary care center for further neuro evaluation as we do not have neurologist on staff here.      Arrangement was made for patient to be transferred.  Awaiting bed    Case is discussed with  few times since admission.  I discussed all above with Dr. Laureano as well        Electronically signed by Jarod Mcmullen MD on 7/16/2025 at 8:19 AM

## 2025-07-16 NOTE — PLAN OF CARE
Problem: Chronic Conditions and Co-morbidities  Goal: Patient's chronic conditions and co-morbidity symptoms are monitored and maintained or improved  7/15/2025 2108 by Yordan Cyr RN  Outcome: Progressing  7/15/2025 1722 by Maisha Bains RN  Outcome: Progressing     Problem: Discharge Planning  Goal: Discharge to home or other facility with appropriate resources  7/15/2025 2108 by Yordan Cyr RN  Outcome: Progressing  7/15/2025 1722 by Maisha Bains RN  Outcome: Progressing     Problem: Safety - Adult  Goal: Free from fall injury  7/15/2025 2108 by Yordan Cyr RN  Outcome: Progressing  7/15/2025 1722 by Maisha Bains RN  Outcome: Progressing  Flowsheets (Taken 7/15/2025 0809)  Free From Fall Injury: Instruct family/caregiver on patient safety     Problem: Skin/Tissue Integrity  Goal: Skin integrity remains intact  Description: 1.  Monitor for areas of redness and/or skin breakdown  2.  Assess vascular access sites hourly  3.  Every 4-6 hours minimum:  Change oxygen saturation probe site  4.  Every 4-6 hours:  If on nasal continuous positive airway pressure, respiratory therapy assess nares and determine need for appliance change or resting period  7/15/2025 2108 by Yordan Cyr RN  Outcome: Progressing  7/15/2025 1722 by Maisha Bains RN  Outcome: Progressing  Flowsheets (Taken 7/15/2025 0809)  Skin Integrity Remains Intact:   Monitor for areas of redness and/or skin breakdown   Assess vascular access sites hourly   Turn and reposition as indicated   Assess need for specialty bed   Positioning devices   Monitor skin under medical devices     Problem: ABCDS Injury Assessment  Goal: Absence of physical injury  7/15/2025 2108 by Yordan Cyr RN  Outcome: Progressing  7/15/2025 1722 by Maisha Bains RN  Outcome: Progressing  Flowsheets (Taken 7/15/2025 0809)  Absence of Physical Injury: Implement safety measures based on patient assessment     Problem: Pain  Goal:

## 2025-07-16 NOTE — PROGRESS NOTES
Goals   Was pt agreeable to Eval/treatment? Yes yes To be met in 4 days   Pain level   0/10   0/10    Bed Mobility  Using rails and head of bed elevated:     Rolling: Minimal assist to R side, Maximal assist to L side with use of bed rails    Supine to sit: Maximal assist of 1    Sit to supine: Maximal assist of 1    Scooting: Dependent assist of 1-2   Using rails and head of bed elevated:     Rolling: Maximal assist of 1   Supine to sit: Maximal assist of  2   Sit to supine: Maximal assist of  2   Scooting: Maximal assist of 1    Rolling: Supervision to R side, Moderate assist to L side    Supine to sit: Maximal assist of 1    Sit to supine: Maximal assist of 1    Scooting: Dependent assist of 1     Transfers Sit to stand: Not assessed for safety reasons due to heavy R lean in sitting  Sit to stand: Maximal assist of  2 with her right knee blocked   Sit to stand: Maximal assist of  2 if safe to attempt   Ambulation    not assessed    not assessed    Stair negotiation: ascended and descended   Not assessed     Not assessed    ROM Within functional limits    Increase range of motion 10% of affected joints    Strength BUE:  refer to OT eval  RLE:  0/5 and flaccid  LLE:  WFL  Increase strength in affected mm groups by 1/3 grade   Balance Sitting EOB:  poor with heavy R lean and Maximal assist to remain upright  Dynamic Standing:  not assessed  Sitting EOB: fair right lateral/posterior lean at times  Dynamic Standing: poor to fair-   right lateral lean using HHA x 2   Sitting EOB:   poor+  Dynamic Standing: not assessed      Patient is Alert & Oriented to self and month but not place or year. verbally  She follows one step directions  inconsistently with repetition and visual/tactile cuing  Sensation:  Patient  not appropriate to assess    Edema:  no   Endurance: poor     Vitals: room air   Blood Pressure at rest  Blood Pressure during session    Heart Rate at rest  Heart Rate during session      Patient  education  Patient/daughter educated on role of Physical Therapy, risks of immobility, safety and plan of care,  importance of mobility while in hospital , safety , and facilitation of R visual awareness and sensation/passive movement to R extremities     Patient response to education:   Pt verbalized understanding Pt demonstrated skill Pt requires further education in this area   No Partial Yes      Treatment:  Patient practiced and was instructed/facilitated in the following treatment:   Patient assisted with supine RLE exercises.  Patient assisted to edge of bed,  Sat edge of bed 20 minutes with minimal to maximal assist to increase dynamic sitting balance and activity tolerance. Assist provided seated on R side of pt secondary to heavy Right lateral/posterior lean. Working on sitting balance, trunk exercises to increase core strength, leaning on her left elbow to decrease her right lateral lean. Pt stood for 1 rep, incontinent of BM, and returned to supine. Pt repositioned to her comfort.     Therapeutic Exercises:  ankle pumps, heel slide, hip abduction/adduction, straight leg raise, and SAQ, x 15 reps on RLE only; all PROM.    At end of session, patient in bed with daughter present call light and phone within reach,  all lines and tubes intact, nursing notified.      Patient would benefit from continued skilled Physical Therapy to improve functional independence and quality of life.         Patient's/ family goals   rehab    Time in 10:15  Time out 11:00    Total Treatment Time  45 minutes        CPT codes:    Therapeutic activities (12191)   35 minutes  2 unit(s)  Therapeutic exercises (46199)   10 minutes  1 unit(s)    Kieran Stoddard  [facility]  LIC # [id number]

## 2025-07-16 NOTE — PROGRESS NOTES
OCCUPATIONAL THERAPY BEDSIDE TREATMENT NOTE  [facility]  [address] [place]. [place]    Date:2025  Patient Name: Sabrina Bajwa  MRN: [id number]  : 1955  Room: [address] [place] [address]       Evaluating OT: Kevin Quijano OTR/L; #[id number]      Referring Provider and Specific Provider Orders/Date:      07/10/25 2115   OT eval and treat  Start:  07/10/25 2115,   End:  07/10/25 2115,   ONE TIME,   Standing Count:  1 Occurrences,   R         Hi, Lisandra JONES, APRN - CNP         Placement Recommendation: Acute Rehab        Diagnosis:   1. Altered mental status, unspecified altered mental status type    2. Chronic renal impairment, stage 3 (moderate), unspecified whether stage 3a or 3b CKD (HCC)    3. Elevated troponin    4. Acute CVA (cerebrovascular accident) (HCC)         Surgery: None       Pertinent Medical History:       Past Medical History        Past Medical History:   Diagnosis Date    Arthritis      Asthma      Diabetes mellitus (HCC)      GERD (gastroesophageal reflux disease)      Hyperlipidemia      Hypertension      Immune deficiency disorder      MI, old     Microalbuminuria due to type 2 diabetes mellitus (HCC) 2016    Mitral valve regurgitation      Vitamin D deficiency 2016            Past Surgical History         Past Surgical History:   Procedure Laterality Date    BREAST SURGERY         reduction    lumpectomy    CHOLECYSTECTOMY, LAPAROSCOPIC N/A 2022     LAPAROSCOPIC CHOLECYSTECTOMY performed by Ernie Medina MD at [facility] OR    COLONOSCOPY        FRACTURE SURGERY         right thumb and tailbone    HYSTERECTOMY (CERVIX STATUS UNKNOWN)        PACEMAKER PLACEMENT        defibrillator at [facility]     UPPER GASTROINTESTINAL ENDOSCOPY N/A 2021     EGD ESOPHAGOGASTRODUODENOSCOPY ULTRASOUND performed by Ernie Medina MD at [facility] OR            Precautions:  Fall Risk, R UE and LE Flaccid, Expressive aphasia, Use  seated in bed Moderate Assist    Bathing Maximal Assist Max A  Simulated tasks at the EOB level Minimal Assist    Toileting Dependent  Dep  Patient incontinent of bowel, assistance for all parts of hygiene and clothing management Moderate Assist    Bed Mobility  Supine to sit: Maximal Assist   Sit to supine: Maximal Assist x 2  Rolling:Maximal Assist  x 2 Supine to sit: Maximal Assist  x2  Sit to supine: Maximal Assist x 2  Rolling:Maximal Assist  x 2 Supine to sit: Minimal Assist   Sit to supine: Minimal Assist   Rolling:Minimal Assist     Functional Transfers Not assessed  d/t pt overall debility, decreased activity tolerance, balance deficits, safety and fall risk.    Max A x2  To perform STS at the EOB with hand held assistance and supporting of the RUE, patient able to stand for 1min tolerance Moderate Assist    Functional Mobility Not assessed  d/t pt overall debility, decreased activity tolerance, balance deficits, safety and fall risk.    Not assessed  d/t pt overall debility, decreased activity tolerance, balance deficits, safety and fall risk.  Moderate Assist    Balance Sitting:     Static: fair -; R lateral lean    Dynamic: poor  Standing: Not assessed  d/t pt overall debility, decreased activity tolerance, balance deficits, safety and fall risk.    Sitting:     Static: fair -; R lateral and posterior lean with max A progressing to min A for upright position at EOB. Patient able to tolerate approx 20min at the EOB    Dynamic: poor  Standing: poor with max A x 2 for 2 reps; strong R lateral lean; assist to block R knee Sitting:     Static: good     Dynamic: fair   Standing: fair  with london walker   Activity Tolerance fair - Fair good    Visual/  Perceptual Glasses: Yes                       Hand Dominance: R       AROM (PROM) Strength Additional Info:  Goal:   RUE  Flaccid 0/5 Poor  and Poor FMC/dexterity noted during ADL tasks    Improve overall RUE strength  for participation in functional tasks

## 2025-07-16 NOTE — CARE COORDINATION
7/16/25  Note: Pt awaiting transfer to [facility]. CM following. Electronically signed by AGUEDA Jerome on 7/16/2025 at 4:51 PM

## 2025-07-16 NOTE — PLAN OF CARE
Problem: Chronic Conditions and Co-morbidities  Goal: Patient's chronic conditions and co-morbidity symptoms are monitored and maintained or improved  7/16/2025 0952 by Kelsey Barbosa RN  Outcome: Progressing  Flowsheets (Taken 7/16/2025 0730)  Care Plan - Patient's Chronic Conditions and Co-Morbidity Symptoms are Monitored and Maintained or Improved: Monitor and assess patient's chronic conditions and comorbid symptoms for stability, deterioration, or improvement  7/15/2025 2108 by Yordan Cyr RN  Outcome: Progressing  Flowsheets (Taken 7/15/2025 2028)  Care Plan - Patient's Chronic Conditions and Co-Morbidity Symptoms are Monitored and Maintained or Improved: Monitor and assess patient's chronic conditions and comorbid symptoms for stability, deterioration, or improvement     Problem: Discharge Planning  Goal: Discharge to home or other facility with appropriate resources  7/16/2025 0952 by Kelsey Barbosa RN  Outcome: Progressing  Flowsheets (Taken 7/16/2025 0730)  Discharge to home or other facility with appropriate resources: Identify barriers to discharge with patient and caregiver  7/15/2025 2108 by Yordan Cyr RN  Outcome: Progressing  Flowsheets (Taken 7/15/2025 2028)  Discharge to home or other facility with appropriate resources: Identify barriers to discharge with patient and caregiver     Problem: Safety - Adult  Goal: Free from fall injury  7/16/2025 0952 by Kelsey Barbosa RN  Outcome: Progressing  Flowsheets (Taken 7/16/2025 0730)  Free From Fall Injury: Instruct family/caregiver on patient safety  7/15/2025 2108 by Yordan Cyr RN  Outcome: Progressing     Problem: Skin/Tissue Integrity  Goal: Skin integrity remains intact  Description: 1.  Monitor for areas of redness and/or skin breakdown  2.  Assess vascular access sites hourly  3.  Every 4-6 hours minimum:  Change oxygen saturation probe site  4.  Every 4-6 hours:  If on nasal continuous positive airway pressure, respiratory

## 2025-07-17 LAB
ANION GAP SERPL CALCULATED.3IONS-SCNC: 12 MMOL/L (ref 7–16)
BASOPHILS # BLD: 0.05 K/UL (ref 0–0.2)
BASOPHILS NFR BLD: 1 % (ref 0–2)
BUN SERPL-MCNC: 25 MG/DL (ref 8–23)
CALCIUM SERPL-MCNC: 7.7 MG/DL (ref 8.8–10.2)
CHLORIDE SERPL-SCNC: 104 MMOL/L (ref 98–107)
CO2 SERPL-SCNC: 16 MMOL/L (ref 22–29)
CREAT SERPL-MCNC: 3.1 MG/DL (ref 0.5–1)
CREAT UR-MCNC: 78.2 MG/DL (ref 29–226)
CREAT UR-MCNC: 79.3 MG/DL (ref 29–226)
EOSINOPHIL # BLD: 0.42 K/UL (ref 0.05–0.5)
EOSINOPHILS RELATIVE PERCENT: 8 % (ref 0–6)
ERYTHROCYTE [DISTWIDTH] IN BLOOD BY AUTOMATED COUNT: 16.1 % (ref 11.5–15)
GFR, ESTIMATED: 16 ML/MIN/1.73M2
GLUCOSE BLD-MCNC: 130 MG/DL (ref 74–99)
GLUCOSE BLD-MCNC: 151 MG/DL (ref 74–99)
GLUCOSE BLD-MCNC: 170 MG/DL (ref 74–99)
GLUCOSE BLD-MCNC: 263 MG/DL (ref 74–99)
GLUCOSE SERPL-MCNC: 156 MG/DL (ref 74–99)
HCT VFR BLD AUTO: 36.2 % (ref 34–48)
HGB BLD-MCNC: 11.6 G/DL (ref 11.5–15.5)
LYMPHOCYTES NFR BLD: 1.43 K/UL (ref 1.5–4)
LYMPHOCYTES RELATIVE PERCENT: 27 % (ref 20–42)
MAGNESIUM SERPL-MCNC: 1.4 MG/DL (ref 1.6–2.4)
MCH RBC QN AUTO: 28.1 PG (ref 26–35)
MCHC RBC AUTO-ENTMCNC: 32 G/DL (ref 32–34.5)
MCV RBC AUTO: 87.7 FL (ref 80–99.9)
MICROALBUMIN UR-MCNC: 1507 MG/L (ref 0–20)
MICROALBUMIN/CREAT UR-RTO: 1927 MCG/MG CREAT (ref 0–30)
MONOCYTES NFR BLD: 0.85 K/UL (ref 0.1–0.95)
MONOCYTES NFR BLD: 16 % (ref 2–12)
NEUTROPHILS NFR BLD: 48 % (ref 43–80)
NEUTS SEG NFR BLD: 2.54 K/UL (ref 1.8–7.3)
PHOSPHATE SERPL-MCNC: 3 MG/DL (ref 2.5–4.5)
PLATELET CONFIRMATION: NORMAL
PLATELET, FLUORESCENCE: 132 K/UL (ref 130–450)
PMV BLD AUTO: 12.4 FL (ref 7–12)
POTASSIUM SERPL-SCNC: 4.7 MMOL/L (ref 3.5–5.1)
RBC # BLD AUTO: 4.13 M/UL (ref 3.5–5.5)
SODIUM SERPL-SCNC: 131 MMOL/L (ref 136–145)
TOTAL PROTEIN, URINE: 270 MG/DL (ref 0–12)
URINE TOTAL PROTEIN CREATININE RATIO: 3.4 (ref 0–0.2)
WBC OTHER # BLD: 5.3 K/UL (ref 4.5–11.5)

## 2025-07-17 PROCEDURE — 97530 THERAPEUTIC ACTIVITIES: CPT

## 2025-07-17 PROCEDURE — 84156 ASSAY OF PROTEIN URINE: CPT

## 2025-07-17 PROCEDURE — 82043 UR ALBUMIN QUANTITATIVE: CPT

## 2025-07-17 PROCEDURE — 84300 ASSAY OF URINE SODIUM: CPT

## 2025-07-17 PROCEDURE — 6370000000 HC RX 637 (ALT 250 FOR IP): Performed by: INTERNAL MEDICINE

## 2025-07-17 PROCEDURE — 97110 THERAPEUTIC EXERCISES: CPT

## 2025-07-17 PROCEDURE — 82962 GLUCOSE BLOOD TEST: CPT

## 2025-07-17 PROCEDURE — 6370000000 HC RX 637 (ALT 250 FOR IP)

## 2025-07-17 PROCEDURE — 36415 COLL VENOUS BLD VENIPUNCTURE: CPT

## 2025-07-17 PROCEDURE — 83735 ASSAY OF MAGNESIUM: CPT

## 2025-07-17 PROCEDURE — 2580000003 HC RX 258: Performed by: INTERNAL MEDICINE

## 2025-07-17 PROCEDURE — 84100 ASSAY OF PHOSPHORUS: CPT

## 2025-07-17 PROCEDURE — 80048 BASIC METABOLIC PNL TOTAL CA: CPT

## 2025-07-17 PROCEDURE — 85025 COMPLETE CBC W/AUTO DIFF WBC: CPT

## 2025-07-17 PROCEDURE — 82570 ASSAY OF URINE CREATININE: CPT

## 2025-07-17 PROCEDURE — 2060000000 HC ICU INTERMEDIATE R&B

## 2025-07-17 PROCEDURE — 84540 ASSAY OF URINE/UREA-N: CPT

## 2025-07-17 RX ORDER — SODIUM BICARBONATE 650 MG/1
1300 TABLET ORAL 3 TIMES DAILY
Status: DISCONTINUED | OUTPATIENT
Start: 2025-07-17 | End: 2025-07-21 | Stop reason: HOSPADM

## 2025-07-17 RX ADMIN — INSULIN LISPRO 4 UNITS: 100 INJECTION, SOLUTION INTRAVENOUS; SUBCUTANEOUS at 12:24

## 2025-07-17 RX ADMIN — ASPIRIN 81 MG: 81 TABLET, CHEWABLE ORAL at 08:53

## 2025-07-17 RX ADMIN — DEXTROSE AND SODIUM CHLORIDE: 5; .9 INJECTION, SOLUTION INTRAVENOUS at 18:31

## 2025-07-17 RX ADMIN — ACETAMINOPHEN 650 MG: 325 TABLET ORAL at 23:00

## 2025-07-17 RX ADMIN — METOPROLOL SUCCINATE 75 MG: 25 TABLET, EXTENDED RELEASE ORAL at 08:53

## 2025-07-17 RX ADMIN — LEVOTHYROXINE SODIUM 50 MCG: 0.05 TABLET ORAL at 05:29

## 2025-07-17 RX ADMIN — HYDRALAZINE HYDROCHLORIDE 25 MG: 25 TABLET ORAL at 08:54

## 2025-07-17 RX ADMIN — SODIUM BICARBONATE 650 MG TABLET 1300 MG: at 14:50

## 2025-07-17 RX ADMIN — METOPROLOL SUCCINATE 75 MG: 25 TABLET, EXTENDED RELEASE ORAL at 20:01

## 2025-07-17 RX ADMIN — METHOCARBAMOL 750 MG: 500 TABLET ORAL at 20:01

## 2025-07-17 RX ADMIN — CLOPIDOGREL BISULFATE 75 MG: 75 TABLET, FILM COATED ORAL at 08:53

## 2025-07-17 RX ADMIN — HYDRALAZINE HYDROCHLORIDE 25 MG: 25 TABLET ORAL at 14:50

## 2025-07-17 RX ADMIN — HYDRALAZINE HYDROCHLORIDE 25 MG: 25 TABLET ORAL at 20:01

## 2025-07-17 RX ADMIN — ATORVASTATIN CALCIUM 40 MG: 40 TABLET, FILM COATED ORAL at 08:54

## 2025-07-17 RX ADMIN — PANTOPRAZOLE SODIUM 40 MG: 40 TABLET, DELAYED RELEASE ORAL at 05:29

## 2025-07-17 RX ADMIN — SODIUM BICARBONATE 650 MG TABLET 1300 MG: at 20:01

## 2025-07-17 ASSESSMENT — PAIN DESCRIPTION - DESCRIPTORS: DESCRIPTORS: ACHING

## 2025-07-17 ASSESSMENT — PAIN SCALES - GENERAL: PAINLEVEL_OUTOF10: 0

## 2025-07-17 ASSESSMENT — PAIN DESCRIPTION - LOCATION: LOCATION: ABDOMEN

## 2025-07-17 NOTE — PROGRESS NOTES
Physical Therapy   Treatment Note/Plan of Care    Room #:  0533/0533-02  Patient Name: Sabrina Bajwa  YOB: 1955  MRN: [id number]    Date of Service: 7/17/2025     Tentative placement recommendation: Acute Rehab with pt's daughter in agreement  Equipment recommendation: To be determined      Evaluating Physical Therapist: Lula Dickerson, PT #[id number]      Specific Provider Orders/Date/Referring PrPT eval and treat  Start:  07/10/25 2115,   End:  07/10/25 2115,   ONE TIME,   Standing Count:  1 Occurrences,   R       Lisandra Do, APRN - CNPovider :     Admitting Diagnosis:   Cerebral artery occlusion [I66.9]  Elevated troponin [R79.89]  Acute CVA (cerebrovascular accident) (HCC) [I63.9]  Altered mental status, unspecified altered mental status type [R41.82]  Chronic renal impairment, stage 3 (moderate), unspecified whether stage 3a or 3b CKD (HCC) [N18.30]    Pt is a 70 y.o. female adm 7/11/25 with acute change in mental status and R weakness Dx with cerebral artery occlusion. No MRI due to pacemaker insertion.       Visit Diagnoses         Codes      Altered mental status, unspecified altered mental status type    -  Primary R41.82      Chronic renal impairment, stage 3 (moderate), unspecified whether stage 3a or 3b CKD (HCC)     N18.30      Elevated troponin     R79.89            Patient Active Problem List   Diagnosis    Diabetes mellitus (HCC)    Hypertension    Asthma    Hyperlipidemia    Vitamin D deficiency    Microalbuminuria due to type 2 diabetes mellitus (HCC)    Enteritis    Intractable vomiting    Dilated cbd, acquired    Cerebral artery occlusion    Acute CVA (cerebrovascular accident) (HCC)    Acute encephalopathy    Right arm weakness    Asymptomatic occlusion of anterior cerebral artery        ASSESSMENT of Current Deficits Patient exhibits receptive & expressive aphasia with decreased strength, balance, and endurance impairing bed mobility, sitting balance, transfers, gait , and tolerance    Was pt agreeable to Eval/treatment? Yes yes To be met in 4 days   Pain level   0/10   0/10    Bed Mobility  Using rails and head of bed elevated:     Rolling: Minimal assist to R side, Maximal assist to L side with use of bed rails    Supine to sit: Maximal assist of 1    Sit to supine: Maximal assist of 1    Scooting: Dependent assist of 1-2   Using rails and head of bed elevated:     Rolling: Maximal assist of 1   Supine to sit: Not assessed patient seated edge of bed   Sit to supine: Maximal assist of  2   Scooting: Maximal assist of 1    Rolling: Supervision to R side, Moderate assist to L side    Supine to sit: Maximal assist of 1    Sit to supine: Maximal assist of 1    Scooting: Dependent assist of 1     Transfers Sit to stand: Not assessed for safety reasons due to heavy R lean in sitting  Sit to stand: Maximal assist of  2 with her right knee blocked   Sit to stand: Maximal assist of  2 if safe to attempt   Ambulation    not assessed    not assessed    Stair negotiation: ascended and descended   Not assessed     Not assessed    ROM Within functional limits    Increase range of motion 10% of affected joints    Strength BUE:  refer to OT eval  RLE:  0/5 and flaccid  LLE:  WFL  Increase strength in affected mm groups by 1/3 grade   Balance Sitting EOB:  poor with heavy R lean and Maximal assist to remain upright  Dynamic Standing:  not assessed  Sitting EOB: fair right lateral lean at times  Dynamic Standing: poor to fair-   right lateral lean using HHA x 2   Sitting EOB:   poor+  Dynamic Standing: not assessed      Patient is Alert & Oriented to self and month but not place or year. verbally  She follows one step directions  inconsistently with repetition and visual/tactile cuing  Sensation:  Patient  not appropriate to assess    Edema:  no   Endurance: poor     Vitals: room air   Blood Pressure at rest  Blood Pressure during session    Heart Rate at rest  Heart Rate during session      Patient

## 2025-07-17 NOTE — PLAN OF CARE
Problem: Chronic Conditions and Co-morbidities  Goal: Patient's chronic conditions and co-morbidity symptoms are monitored and maintained or improved  Outcome: Progressing     Problem: Chronic Conditions and Co-morbidities  Goal: Patient's chronic conditions and co-morbidity symptoms are monitored and maintained or improved  Outcome: Progressing     Problem: Chronic Conditions and Co-morbidities  Goal: Patient's chronic conditions and co-morbidity symptoms are monitored and maintained or improved  Outcome: Progressing     Problem: Chronic Conditions and Co-morbidities  Goal: Patient's chronic conditions and co-morbidity symptoms are monitored and maintained or improved  Outcome: Progressing

## 2025-07-17 NOTE — PROGRESS NOTES
Internal Medicine Consult Note    [facility]    Vishal Jarrett D.O., VICI.                    Cecil Laureano D.O., VICIPhyllis Merida D.O.     Mahamed Kebede, MSN, APRN-CNP  Yusuf Grijalva, MSN, APRN-CNP  Lisandra Do, MSN. APRN-NP-C  Linda Kim MSN, APRN, ACNP-AG     Primary Care Physician: Jesse Rojas DO   Admitting Physician:  Vishal Jarrett DO  Admission date and time: 7/10/2025 12:36 PM    Room:  [address] [place] [address]  Admitting diagnosis: Cerebral artery occlusion [I66.9]  Elevated troponin [R79.89]  Acute CVA (cerebrovascular accident) (HCC) [I63.9]  Altered mental status, unspecified altered mental status type [R41.82]  Chronic renal impairment, stage 3 (moderate), unspecified whether stage 3a or 3b CKD (HCC) [N18.30]    Patient Name: Sabrina Bajwa  MRN: [id number]    Date of Service: 7/17/2025     Subjective:  Sabrina is a 70 y.o. female who was seen and examined today,7/17/2025, at the bedside.  Sabrina is resting comfortably.  Her daughter (Selma) is currently at the bedside.  There has been no further changes in the patient's neurological status.  Her right side remains flaccid with intermittent periods of expressive aphasia.  Lengthy discussion was had with the patient's daughter about her mother's condition, is explained to her that multiple facilities have been contacted by [facility] has excepted but there is no beds available at this time.  All questions were answered at this time.  Review of System: Limited  Constitutional:   Does not report fever or chills, weight loss or gain, positive fatigue  HEENT:   Does not report  ear pain, sore throat, sinus or eye problems.  Cardiovascular:   Does not report any chest pain, or palpitations.   Respiratory:   Does not report  shortness of breath, coughing, sputum production, hemoptysis, or wheezing.  Gastrointestinal:   Does not report  nausea, vomiting, diarrhea, or

## 2025-07-17 NOTE — PROGRESS NOTES
Comprehensive Nutrition Assessment    Type and Reason for Visit:  Initial, LOS    Nutrition Recommendations/Plan:   Continue diet as ordered  Add Fortified Gelatin BID to promote kcal/protein intakes 2/2 HD tx  Continue inpatient monitoring     Malnutrition Assessment:  Malnutrition Status:  At risk for malnutrition (07/17/25 1418)    Context:  Acute Illness     Findings of the 6 clinical characteristics of malnutrition:  Energy Intake:  No decrease in energy intake  Weight Loss:  No weight loss (wt flux likely 2/2 HD)     Body Fat Loss:  No body fat loss     Muscle Mass Loss:  No muscle mass loss    Fluid Accumulation:  Unable to assess (multifactorial)     Strength:  Not Performed    Nutrition Assessment:    Pt admitted for acute CVA, cerebral artery occlusion, LYDIA on CKD and nonischemic cardiomyopathy. PMH of CHF and DM. Pt reports good appetite at present and PTA. Reports to eating at least 75% of trays. s/p HD cath 7/14 for urgent dialysis. pending transfer to [facility]. Continue diet as ordered. Will add Fortified Gelatin BID to promote kcal/protein intakes 2/2 HD tx. Continue inpatient monitoring.    Nutrition Related Findings:    A/O x4, abd soft/rounded/obese, I/O -5.2 L, +1 edema BUE/BLE, Na 131, BUN 25, Cr 3.1, Mg 1.4, , A1C 7.5, synthroid, insulin, IVF Wound Type: None       Current Nutrition Intake & Therapies:    Average Meal Intake: 51-75%, % (per pt)  Average Supplements Intake: None Ordered  ADULT DIET; Regular; 5 carb choices (75 gm/meal); Low Fat/Low Chol/High Fiber/MARJAN  ADULT ORAL NUTRITION SUPPLEMENT; Lunch, Dinner; Fortified Gelatin Oral Supplement    Anthropometric Measures:  Height: 154.9 cm (5' 0.98\")  Ideal Body Weight (IBW): 105 lbs (48 kg)    Admission Body Weight: 82.1 kg (181 lb) (7/14 bed scale)  Current Body Weight: 87 kg (191 lb 12.8 oz) (7/16), 182.7 % IBW. Weight Source: Bed scale  Current BMI (kg/m2): 36.3  Usual Body Weight:  (SUZE - no wt hx within past 1 yr per EMR)         Weight Adjustment For: No Adjustment                 BMI Categories: Obese Class 2 (BMI 35.0 -39.9)    Estimated Daily Nutrient Needs:  Energy Requirements Based On: Formula  Weight Used for Energy Requirements: Current  Energy (kcal/day): 1816-0626 kcal  Weight Used for Protein Requirements: Ideal  Protein (g/day): 80-90 g  Method Used for Fluid Requirements: Defer to provider    Nutrition Diagnosis:   Increased nutrient needs related to renal dysfunction as evidenced by dialysis    Nutrition Interventions:   Food and/or Nutrient Delivery: Continue Current Diet, Start Oral Nutrition Supplement  Nutrition Education/Counseling: No recommendation at this time  Coordination of Nutrition Care: Continue to monitor while inpatient       Goals:  Goals: PO intake 75% or greater, by next RD assessment  Type of Goal: New goal       Nutrition Monitoring and Evaluation:   Behavioral-Environmental Outcomes: None Identified  Food/Nutrient Intake Outcomes: Food and Nutrient Intake, Supplement Intake  Physical Signs/Symptoms Outcomes: Biochemical Data, GI Status, Fluid Status or Edema, Nutrition Focused Physical Findings, Skin, Weight    Discharge Planning:    Too soon to determine     Marcelle Bustamante RDN, LDN  Contact: [phone]

## 2025-07-17 NOTE — CARE COORDINATION
7/17/25  Note: Pt awaiting transfer to [facility]. CM following. Electronically signed by AGUEDA Jerome on 7/17/2025 at 2:22 PM

## 2025-07-17 NOTE — PROGRESS NOTES
Cardiology  Progress Note      SUBJECTIVE:  Still with significant right-sided weakness.No chest pain or shortness of breath.    Current Inpatient Medications  Current Facility-Administered Medications: dextrose 5 % and 0.9 % sodium chloride infusion, , IntraVENous, Continuous  atorvastatin (LIPITOR) tablet 40 mg, 40 mg, Oral, Daily  hydrALAZINE (APRESOLINE) tablet 25 mg, 25 mg, Oral, TID  methocarbamol (ROBAXIN) tablet 750 mg, 750 mg, Oral, 4x Daily PRN  metoprolol succinate (TOPROL XL) extended release tablet 75 mg, 75 mg, Oral, BID  hydrALAZINE (APRESOLINE) injection 5 mg, 5 mg, IntraVENous, Q4H PRN  labetalol (NORMODYNE;TRANDATE) injection 5 mg, 5 mg, IntraVENous, Q4H PRN  sulfur hexafluoride microspheres (LUMASON) 60.7-25 MG injection 2 mL, 2 mL, IntraVENous, ONCE PRN  pantoprazole (PROTONIX) tablet 40 mg, 40 mg, Oral, QAM AC  acetaminophen (TYLENOL) tablet 650 mg, 650 mg, Oral, Q6H PRN  prochlorperazine (COMPAZINE) injection 10 mg, 10 mg, IntraVENous, Q6H PRN  melatonin disintegrating tablet 5 mg, 5 mg, Oral, Nightly PRN  aspirin chewable tablet 81 mg, 81 mg, Oral, Daily  clopidogrel (PLAVIX) tablet 75 mg, 75 mg, Oral, Daily  insulin lispro (HUMALOG,ADMELOG) injection vial 0-8 Units, 0-8 Units, SubCUTAneous, 4x Daily AC & HS  glucose chewable tablet 16 g, 4 tablet, Oral, PRN  dextrose bolus 10% 125 mL, 125 mL, IntraVENous, PRN **OR** dextrose bolus 10% 250 mL, 250 mL, IntraVENous, PRN  glucagon injection 1 mg, 1 mg, SubCUTAneous, PRN  dextrose 10 % infusion, , IntraVENous, Continuous PRN  levothyroxine (SYNTHROID) tablet 50 mcg, 50 mcg, Oral, Daily      Physical  VITALS:  /76   Pulse 66   Temp 98.2 °F (36.8 °C) (Oral)   Resp 14   Ht 1.549 m (5' 1\")   Wt 87 kg (191 lb 12.8 oz)   SpO2 98%   BMI 36.24 kg/m²   CURRENT TEMPERATURE:  Temp: 98.2 °F (36.8 °C)  CONSTITUTIONAL: No acute distress.  EYES: Vision is intact.  ENT: No sore throat.  No ear drainage.  NECK: No JVD.  BACK: Symmetric.  LUNGS:

## 2025-07-17 NOTE — PLAN OF CARE
Problem: Chronic Conditions and Co-morbidities  Goal: Patient's chronic conditions and co-morbidity symptoms are monitored and maintained or improved  7/17/2025 1036 by Susanne Miles RN  Outcome: Progressing     Problem: Discharge Planning  Goal: Discharge to home or other facility with appropriate resources  7/17/2025 1036 by Susanne Miles RN  Outcome: Progressing     Problem: Safety - Adult  Goal: Free from fall injury  7/17/2025 1036 by Susanne Miles RN  Outcome: Progressing     Problem: Skin/Tissue Integrity  Goal: Skin integrity remains intact  Description: 1.  Monitor for areas of redness and/or skin breakdown  2.  Assess vascular access sites hourly  3.  Every 4-6 hours minimum:  Change oxygen saturation probe site  4.  Every 4-6 hours:  If on nasal continuous positive airway pressure, respiratory therapy assess nares and determine need for appliance change or resting period  7/17/2025 1036 by Susanne Miles RN  Outcome: Progressing     Problem: ABCDS Injury Assessment  Goal: Absence of physical injury  7/17/2025 1036 by Susanne Miles RN  Outcome: Progressing     Problem: Pain  Goal: Verbalizes/displays adequate comfort level or baseline comfort level  7/17/2025 1036 by Susanne Miles RN  Outcome: Progressing

## 2025-07-17 NOTE — PROGRESS NOTES
Bathing Maximal Assist N/T; daughter assisted pt in bathing last evening Minimal Assist    Toileting Dependent  Dep  Jim catheter management Moderate Assist    Bed Mobility  Supine to sit: Maximal Assist   Sit to supine: Maximal Assist x 2  Rolling:Maximal Assist  x 2 Supine to sit: Maximal Assist  x2  Sit to supine: Maximal Assist x 2  Rolling:Maximal Assist  x 2; assist for positioning of L UE/LLE and for skin integrity Supine to sit: Minimal Assist   Sit to supine: Minimal Assist   Rolling:Minimal Assist     Functional Transfers Not assessed  d/t pt overall debility, decreased activity tolerance, balance deficits, safety and fall risk.    Max A x2  To perform STS  x 2 reps from the EOB with hand held assistance x 2  and supporting of the RUE, and blocking R knee ; attempted use of hemiwalker, however d/t difficulty returned to HHA x 2 Moderate Assist    Functional Mobility Not assessed  d/t pt overall debility, decreased activity tolerance, balance deficits, safety and fall risk.    Not assessed  d/t pt overall debility, decreased activity tolerance, balance deficits, safety and fall risk.  Moderate Assist    Balance Sitting:     Static: fair -; R lateral lean    Dynamic: poor  Standing: Not assessed  d/t pt overall debility, decreased activity tolerance, balance deficits, safety and fall risk.    Sitting:     Static: fair -; R lateral and posterior lean with with fatigue; intermittent min A/mod A     Dynamic: poor  Standing: poor with max A x 2 for 2 reps; strong R lateral lean; assist to block R knee and hold R UE Sitting:     Static: good     Dynamic: fair   Standing: fair  with london walker   Activity Tolerance fair - Fair ; increased tolerance since last session good    Visual/  Perceptual Glasses: Yes                       Hand Dominance: R       AROM (PROM) Strength Additional Info:  Goal:   RUE  Flaccid 0/5 Poor  and Poor FMC/dexterity noted during ADL tasks    Improve overall RUE strength  for  date includes:   ADL-  Instruction/training on safety and adapted techniques for completion of ADLs: Therapist facilitated & pt educated on activity modifications/adaptations to promote implementation of fall prevention strategies, EC/WS strategies, & safety awareness throughout ADLs.   Mobility-  Instruction/training on safety and improved independence with bed mobility/functional transfers  and functional mobility.   Sitting/Standing Balance/Tolerance:  to increase balance and activity tolerance during ADLs and facilitate proper posture and positioning.   Activity tolerance- Instruction/training on energy conservation/work simplification for completion of ADLs:.     Neuromuscular Reeducation to facilitate balance/righting reactions for increased function with ADLs tasks:    Neuromuscular Facilitation of R UE functional movement/ROM./fine motor dexterity    Cognitive retraining -  Cues for safety, sequencing, and problem solving    Visual/Perception-Facilitation of Visual Perceptual Skills for increased safety and independence with ADLs.    Skilled positioning/alignment-  Therapist facilitated proper positioning/alignment throughout session to maintain skin/joint integrity & proper body mechanics.        Pt has made fair progress towards set goals.   Continue with current plan of care       Treatment Time also includes thorough review of current medical information, gathering information on past medical history/social history and prior level of function, informal observation of tasks, assessment of data and education on plan of care and goals.    Treatment Time In: 9:50 AM     Treatment Time Out: 10:50 AM            Treatment Charges: Mins Units   ADL/Home Mgt     [id number]       Thera Activities     [id number] 40 3   Ther Ex                 [id number] 20 1   Manual Therapy    [id number]     Neuro Re-ed         61810     Orthotic manage/training                               04389     Non Billable Time     Total Timed Treatment 60 4

## 2025-07-17 NOTE — PROGRESS NOTES
Ron Ahumada MD  Nephrology    Progress note.    Patient seen and examined.  Chart reviewed.  Patient's daughter is present at the bedside.  The patient's daughter feels that the patient's speech is improved.  Overall the patient is feeling better.  She has no specific complaints.  She continues to maintain a good urinary output.  She denies any shortness of breath.  Appetite is slowly improving.  No nausea or dysguesia.  Awake and alert . In no acute distress.    Vital SignsBP (!) 139/90   Pulse 67   Temp 97.7 °F (36.5 °C) (Oral)   Resp 16   Ht 1.549 m (5' 1\")   Wt 87 kg (191 lb 12.8 oz)   SpO2 99%   BMI 36.24 kg/m²   24HR INTAKE/OUTPUT:    Intake/Output Summary (Last 24 hours) at 7/17/2025 1057  Last data filed at 7/17/2025 0516  Gross per 24 hour   Intake --   Output 1325 ml   Net -1325 ml          Physical  Exam      Neck: No JVD.  Temporary hemodialysis catheter in place on the right.  Lungs: Breath sounds decreased at the bases. No rales or ronchi.  Heart: Regular rate and rhythm. No S3 gallop. No murmrur.  Abdomen: Soft non distended, non tender and normal bowel sounds.  Extremeties: No bipedal  edema.          Medications:  Current Facility-Administered Medications   Medication Dose Route Frequency Provider Last Rate Last Admin    dextrose 5 % and 0.9 % sodium chloride infusion   IntraVENous Continuous Ron Ahumada MD 80 mL/hr at 07/16/25 1847 New Bag at 07/16/25 1847    atorvastatin (LIPITOR) tablet 40 mg  40 mg Oral Daily Lisandra Do APRN - CNP   40 mg at 07/17/25 0854    hydrALAZINE (APRESOLINE) tablet 25 mg  25 mg Oral TID Lisandra Do APRN - CNP   25 mg at 07/17/25 0854    methocarbamol (ROBAXIN) tablet 750 mg  750 mg Oral 4x Daily PRN Lisandra Do APRN - CNP   750 mg at 07/15/25 2159    metoprolol succinate (TOPROL XL) extended release tablet 75 mg  75 mg Oral BID Lisandra Do APRN - CNP   75 mg at 07/17/25 0853    hydrALAZINE (APRESOLINE) injection 5 mg  5 mg  improved postdialysis but are lower today.  Patient had been on a bicarbonate infusion.  Will start the patient on oral bicarbonate supplements.       Anemia of   chronic kidney disease.    Follow hemoglobin and hematocrit.  Follow serum iron studies.  Use FLORES and titrate the dose to a target hemoglobin of 10.0 g/dL.    Secondary hyperparathyroidism due to renal insufficiency.  Will check PTH level.  Patient with borderline vitamin D deficiency.  Supplement vitamin D.  If needed will start the patient on Calcitrol.      Chronic kidney disease stage G4 secondary to microvascular disease with diabetic nephropathy and hypertensive kidney disease with a possible component of cardiorenal syndrome.  Baseline serum creatinine 3.0 mg/dL. to 3.25 mg/dL .       Patient's condition discussed in detail with the patient's daughter.      Ron Ahumada MD  Nephrology  Electronically signed by Ron Ahumada MD on 7/17/2025 at 10:57 AM    Note: This report was completed utilizing computer voice recognition software. Every effort has been made to ensure accuracy, however; inadvertent computerized transcription errors may be present.

## 2025-07-18 LAB
ANION GAP SERPL CALCULATED.3IONS-SCNC: 10 MMOL/L (ref 7–16)
BASOPHILS # BLD: 0.13 K/UL (ref 0–0.2)
BASOPHILS NFR BLD: 3 % (ref 0–2)
BLASTS ABSOLUTE COUNT: 0.09 K/UL
BLASTS: 2 %
BUN SERPL-MCNC: 28 MG/DL (ref 8–23)
CALCIUM SERPL-MCNC: 7.9 MG/DL (ref 8.8–10.2)
CHLORIDE SERPL-SCNC: 105 MMOL/L (ref 98–107)
CO2 SERPL-SCNC: 22 MMOL/L (ref 22–29)
CREAT SERPL-MCNC: 3.2 MG/DL (ref 0.5–1)
EOSINOPHIL # BLD: 0.18 K/UL (ref 0.05–0.5)
EOSINOPHILS RELATIVE PERCENT: 4 % (ref 0–6)
ERYTHROCYTE [DISTWIDTH] IN BLOOD BY AUTOMATED COUNT: 15.9 % (ref 11.5–15)
GFR, ESTIMATED: 15 ML/MIN/1.73M2
GLUCOSE BLD-MCNC: 146 MG/DL (ref 74–99)
GLUCOSE BLD-MCNC: 186 MG/DL (ref 74–99)
GLUCOSE BLD-MCNC: 188 MG/DL (ref 74–99)
GLUCOSE BLD-MCNC: 219 MG/DL (ref 74–99)
GLUCOSE SERPL-MCNC: 158 MG/DL (ref 74–99)
HCT VFR BLD AUTO: 35 % (ref 34–48)
HGB BLD-MCNC: 10.9 G/DL (ref 11.5–15.5)
INR PPP: 1.2
LYMPHOCYTES NFR BLD: 0.88 K/UL (ref 1.5–4)
LYMPHOCYTES RELATIVE PERCENT: 17 % (ref 20–42)
MAGNESIUM SERPL-MCNC: 1.3 MG/DL (ref 1.6–2.4)
MCH RBC QN AUTO: 26.8 PG (ref 26–35)
MCHC RBC AUTO-ENTMCNC: 31.1 G/DL (ref 32–34.5)
MCV RBC AUTO: 86 FL (ref 80–99.9)
METAMYELOCYTES ABSOLUTE COUNT: 0.09 K/UL (ref 0–0.12)
METAMYELOCYTES: 2 % (ref 0–1)
MONOCYTES NFR BLD: 0.26 K/UL (ref 0.1–0.95)
MONOCYTES NFR BLD: 5 % (ref 2–12)
NEUTROPHILS NFR BLD: 68 % (ref 43–80)
NEUTS SEG NFR BLD: 3.47 K/UL (ref 1.8–7.3)
NUCLEATED RED BLOOD CELLS: 2 PER 100 WBC
PARTIAL THROMBOPLASTIN TIME: 79.1 SEC (ref 24.5–35.1)
PHOSPHATE SERPL-MCNC: 2.5 MG/DL (ref 2.5–4.5)
PLATELET # BLD AUTO: 186 K/UL (ref 130–450)
PMV BLD AUTO: 12.5 FL (ref 7–12)
POTASSIUM SERPL-SCNC: 3.9 MMOL/L (ref 3.5–5.1)
PROTHROMBIN TIME: 12.5 SEC (ref 9.3–12.4)
RBC # BLD AUTO: 4.07 M/UL (ref 3.5–5.5)
RBC # BLD: ABNORMAL 10*6/UL
SODIUM SERPL-SCNC: 137 MMOL/L (ref 136–145)
SODIUM UR-SCNC: 58 MMOL/L
UUN UR-MCNC: 266 MG/DL (ref 800–1666)
WBC OTHER # BLD: 5.1 K/UL (ref 4.5–11.5)

## 2025-07-18 PROCEDURE — 85730 THROMBOPLASTIN TIME PARTIAL: CPT

## 2025-07-18 PROCEDURE — 97530 THERAPEUTIC ACTIVITIES: CPT

## 2025-07-18 PROCEDURE — 83735 ASSAY OF MAGNESIUM: CPT

## 2025-07-18 PROCEDURE — 97110 THERAPEUTIC EXERCISES: CPT

## 2025-07-18 PROCEDURE — 2580000003 HC RX 258: Performed by: INTERNAL MEDICINE

## 2025-07-18 PROCEDURE — 6370000000 HC RX 637 (ALT 250 FOR IP): Performed by: INTERNAL MEDICINE

## 2025-07-18 PROCEDURE — 6370000000 HC RX 637 (ALT 250 FOR IP)

## 2025-07-18 PROCEDURE — 85025 COMPLETE CBC W/AUTO DIFF WBC: CPT

## 2025-07-18 PROCEDURE — 80048 BASIC METABOLIC PNL TOTAL CA: CPT

## 2025-07-18 PROCEDURE — 82962 GLUCOSE BLOOD TEST: CPT

## 2025-07-18 PROCEDURE — 85610 PROTHROMBIN TIME: CPT

## 2025-07-18 PROCEDURE — 84100 ASSAY OF PHOSPHORUS: CPT

## 2025-07-18 PROCEDURE — 2060000000 HC ICU INTERMEDIATE R&B

## 2025-07-18 RX ORDER — DOCUSATE SODIUM 100 MG/1
100 CAPSULE, LIQUID FILLED ORAL 2 TIMES DAILY
Status: DISCONTINUED | OUTPATIENT
Start: 2025-07-18 | End: 2025-07-21 | Stop reason: HOSPADM

## 2025-07-18 RX ADMIN — HYDRALAZINE HYDROCHLORIDE 25 MG: 25 TABLET ORAL at 21:33

## 2025-07-18 RX ADMIN — LEVOTHYROXINE SODIUM 50 MCG: 0.05 TABLET ORAL at 05:00

## 2025-07-18 RX ADMIN — DEXTROSE AND SODIUM CHLORIDE: 5; .9 INJECTION, SOLUTION INTRAVENOUS at 19:20

## 2025-07-18 RX ADMIN — SODIUM BICARBONATE 650 MG TABLET 1300 MG: at 09:11

## 2025-07-18 RX ADMIN — CLOPIDOGREL BISULFATE 75 MG: 75 TABLET, FILM COATED ORAL at 09:12

## 2025-07-18 RX ADMIN — DOCUSATE SODIUM 100 MG: 100 CAPSULE, LIQUID FILLED ORAL at 21:33

## 2025-07-18 RX ADMIN — INSULIN LISPRO 2 UNITS: 100 INJECTION, SOLUTION INTRAVENOUS; SUBCUTANEOUS at 12:32

## 2025-07-18 RX ADMIN — DOCUSATE SODIUM 100 MG: 100 CAPSULE, LIQUID FILLED ORAL at 12:33

## 2025-07-18 RX ADMIN — ASPIRIN 81 MG: 81 TABLET, CHEWABLE ORAL at 09:12

## 2025-07-18 RX ADMIN — HYDRALAZINE HYDROCHLORIDE 25 MG: 25 TABLET ORAL at 09:12

## 2025-07-18 RX ADMIN — INSULIN LISPRO 2 UNITS: 100 INJECTION, SOLUTION INTRAVENOUS; SUBCUTANEOUS at 21:39

## 2025-07-18 RX ADMIN — SODIUM BICARBONATE 650 MG TABLET 1300 MG: at 21:33

## 2025-07-18 RX ADMIN — SODIUM BICARBONATE 650 MG TABLET 1300 MG: at 15:07

## 2025-07-18 RX ADMIN — INSULIN LISPRO 2 UNITS: 100 INJECTION, SOLUTION INTRAVENOUS; SUBCUTANEOUS at 09:12

## 2025-07-18 RX ADMIN — METOPROLOL SUCCINATE 75 MG: 25 TABLET, EXTENDED RELEASE ORAL at 21:33

## 2025-07-18 RX ADMIN — METOPROLOL SUCCINATE 75 MG: 25 TABLET, EXTENDED RELEASE ORAL at 09:11

## 2025-07-18 RX ADMIN — METHOCARBAMOL 750 MG: 500 TABLET ORAL at 21:33

## 2025-07-18 RX ADMIN — HYDRALAZINE HYDROCHLORIDE 25 MG: 25 TABLET ORAL at 15:07

## 2025-07-18 RX ADMIN — ATORVASTATIN CALCIUM 40 MG: 40 TABLET, FILM COATED ORAL at 09:12

## 2025-07-18 RX ADMIN — PANTOPRAZOLE SODIUM 40 MG: 40 TABLET, DELAYED RELEASE ORAL at 05:00

## 2025-07-18 ASSESSMENT — PAIN SCALES - GENERAL
PAINLEVEL_OUTOF10: 0

## 2025-07-18 NOTE — PROGRESS NOTES
Internal Medicine Consult Note    [facility]    Vishal Jarrett D.O., VICI.                    Cecil Laureano D.O., VICIPhyllis Merida D.O.     Mahamed Kebede, MSN, APRN-CNP  Yusuf Grijalva, MSN, APRN-CNP  Lisandra Do, MSN. APRN-NP-C  Linda Kim MSN, APRN, ACNP-AG     Primary Care Physician: Jesse Rojas DO   Admitting Physician:  Vishal Jarrett DO  Admission date and time: 7/10/2025 12:36 PM    Room:  [address] [place] [address]  Admitting diagnosis: Cerebral artery occlusion [I66.9]  Elevated troponin [R79.89]  Acute CVA (cerebrovascular accident) (HCC) [I63.9]  Altered mental status, unspecified altered mental status type [R41.82]  Chronic renal impairment, stage 3 (moderate), unspecified whether stage 3a or 3b CKD (HCC) [N18.30]    Patient Name: Sabrina Bajwa  MRN: [id number]    Date of Service: 7/18/2025     Subjective:  Sabrina is a 70 y.o. female who was seen and examined today,7/18/2025, at the bedside.  Sabrina is resting comfortably.  Her daughter (Selma) is currently at the bedside.  The patient is currently working with physical therapy.  The patient's speech is cleared until she starts becoming flustered.  She is able to squeeze my hand with her right hand but unable to move her right arm or leg.  Review of System: Limited  Constitutional:   Does not report fever or chills, weight loss or gain, positive fatigue  HEENT:   Does not report  ear pain, sore throat, sinus or eye problems.  Cardiovascular:   Does not report any chest pain, or palpitations.   Respiratory:   Does not report  shortness of breath, coughing, sputum production, hemoptysis, or wheezing.  Gastrointestinal:   Does not report  nausea, vomiting, diarrhea, or constipation.  Denies any abdominal pain.  She is tolerating her current diet without evidence of aspiration  Genitourinary:    Does not report any urgency, frequency, hematuria. Voiding  without

## 2025-07-18 NOTE — PROGRESS NOTES
Cardiology  Progress Note      SUBJECTIVE: Alert and awake this morning.  She is answering questions properly.  She is able today to move her right arm slightly and I showed that to the night nurse that was taking care of her.    Current Inpatient Medications  Current Facility-Administered Medications: sodium bicarbonate tablet 1,300 mg, 1,300 mg, Oral, TID  dextrose 5 % and 0.9 % sodium chloride infusion, , IntraVENous, Continuous  atorvastatin (LIPITOR) tablet 40 mg, 40 mg, Oral, Daily  hydrALAZINE (APRESOLINE) tablet 25 mg, 25 mg, Oral, TID  methocarbamol (ROBAXIN) tablet 750 mg, 750 mg, Oral, 4x Daily PRN  metoprolol succinate (TOPROL XL) extended release tablet 75 mg, 75 mg, Oral, BID  hydrALAZINE (APRESOLINE) injection 5 mg, 5 mg, IntraVENous, Q4H PRN  labetalol (NORMODYNE;TRANDATE) injection 5 mg, 5 mg, IntraVENous, Q4H PRN  sulfur hexafluoride microspheres (LUMASON) 60.7-25 MG injection 2 mL, 2 mL, IntraVENous, ONCE PRN  pantoprazole (PROTONIX) tablet 40 mg, 40 mg, Oral, QAM AC  acetaminophen (TYLENOL) tablet 650 mg, 650 mg, Oral, Q6H PRN  prochlorperazine (COMPAZINE) injection 10 mg, 10 mg, IntraVENous, Q6H PRN  melatonin disintegrating tablet 5 mg, 5 mg, Oral, Nightly PRN  aspirin chewable tablet 81 mg, 81 mg, Oral, Daily  clopidogrel (PLAVIX) tablet 75 mg, 75 mg, Oral, Daily  insulin lispro (HUMALOG,ADMELOG) injection vial 0-8 Units, 0-8 Units, SubCUTAneous, 4x Daily AC & HS  glucose chewable tablet 16 g, 4 tablet, Oral, PRN  dextrose bolus 10% 125 mL, 125 mL, IntraVENous, PRN **OR** dextrose bolus 10% 250 mL, 250 mL, IntraVENous, PRN  glucagon injection 1 mg, 1 mg, SubCUTAneous, PRN  dextrose 10 % infusion, , IntraVENous, Continuous PRN  levothyroxine (SYNTHROID) tablet 50 mcg, 50 mcg, Oral, Daily      Physical  VITALS:  /75   Pulse 63   Temp 97.5 °F (36.4 °C) (Oral)   Resp 12   Ht 1.549 m (5' 0.98\")   Wt 87 kg (191 lb 12.8 oz)   SpO2 97%   BMI 36.26 kg/m²   CURRENT TEMPERATURE:  Temp: 97.5  of paroxysmal fibrillation since her last ICD interrogation done about 3 months ago.    She is on dual antiplatelet therapy.    Patient had downtrending troponin elevation starting at 102 going down to 99 and then 93.  EKG is nondiagnostic due to the presence of paced ventricular activity.  This downtrending troponin ovation appears to be probably more demand ischemia from her neuro event with type II non-STEMI .I will hold on coronary workup for now    Creatinine was around 3 on admission.  Entresto was stopped.      Creatinine kept rising.  It is up almost to 6.4.     Patient was started on hemodialysis.  Creatinine is down to around 3.2.  The nephrology team is hoping that patient will not require long term hemodialysis    Mental status did deteriorate after admission.  Patient was able to squeeze my fingers with both hands when I saw her down in ER the day of admit even though she felt weak all over her body.  She became completely hemiplegic on the right side after admission    I checked her ICD and it appears to be MRI compatible and I recommend MRI of the brain    The patient had this ICD few years ago at the [facility] and patient is running into end of battery life and indeed she was supposed to be seen at the [facility] next month with the plan to replace her ICD generator.    The department of MRI in this hospital did not feel comfortable doing MRI of the brain in view of all above.    Arrangement was made for patient to be transferred.  Multiple Tertiary care facilities were contacted. Primary care team made all the effort to arrange for this transfer. There is not available yet.    I discussed the case with Dr. Quintero the electrophysiologist at the [facility] and he is going to review her ICD findings and see how much of life left and her battery and then make decision if the battery needs to be changed immediately or it can wait till next month.  He is willing to take her to [place]

## 2025-07-18 NOTE — CARE COORDINATION
7/18/25 SW Note: Pt awaiting transfer to [facility]. CM following. Electronically signed by AGUEDA Jerome on 7/18/2025 at 2:58 PM    Addendum: Met with pt & Miyoshi/Dtr at bedside. Dtr requesting referral/clinical documentation be sent to [facility] (Phone [phone]; fax [phone]) in [place], [place]. Per discussion notified that it will be re-evaluated on Monday need to complete referral depending on status of [facility] transfer to ensure referral reflects info needed for d/c to [facility]. Per dtrs request provided with copies of HCPOA, LW, & Donor Registry to review, notified that spiritual care can assist with completion, declined current need. CM following. Electronically signed by AGUEDA Jerome on 7/18/2025 at 4:31 PM

## 2025-07-18 NOTE — PROGRESS NOTES
patient on the current antihypertensive regimen.      Metabolic acidosis.  Metabolic acidosis secondary to chronic kidney disease with superimposed acute kidney injury. Patient had been on a bicarbonate infusion.  Bicarbonate levels now improved with oral bicarbonate supplement.  Target bicarb level 22 mmol/L.    Anemia of   chronic kidney disease.    Follow hemoglobin and hematocrit.  Follow serum iron studies.  Use FLORES and titrate the dose to a target hemoglobin of 10.0 g/dL.    Secondary hyperparathyroidism due to renal insufficiency.  Will check PTH level.  Patient with borderline vitamin D deficiency.  Supplement vitamin D.  If needed will start the patient on Calcitrol.      Chronic kidney disease stage G4 secondary to microvascular disease with diabetic nephropathy and hypertensive kidney disease with a possible component of cardiorenal syndrome.  Baseline serum creatinine 3.0 mg/dL. to 3.25 mg/dL .       Patient's condition discussed in detail with the patient's daughter.      Ron Ahumada MD  Nephrology  Electronically signed by Ron Ahumada MD on 7/18/2025 at 3:39 PM    Note: This report was completed utilizing computer voice recognition software. Every effort has been made to ensure accuracy, however; inadvertent computerized transcription errors may be present.

## 2025-07-18 NOTE — PLAN OF CARE
Problem: Chronic Conditions and Co-morbidities  Goal: Patient's chronic conditions and co-morbidity symptoms are monitored and maintained or improved  7/18/2025 0830 by Susanne Miles RN  Outcome: Progressing     Problem: Discharge Planning  Goal: Discharge to home or other facility with appropriate resources  7/18/2025 0830 by Susanne Miles RN  Outcome: Progressing     Problem: Safety - Adult  Goal: Free from fall injury  7/18/2025 0830 by Susanne Miles RN  Outcome: Progressing     Problem: Skin/Tissue Integrity  Goal: Skin integrity remains intact  Description: 1.  Monitor for areas of redness and/or skin breakdown  2.  Assess vascular access sites hourly  3.  Every 4-6 hours minimum:  Change oxygen saturation probe site  4.  Every 4-6 hours:  If on nasal continuous positive airway pressure, respiratory therapy assess nares and determine need for appliance change or resting period  7/18/2025 0830 by Susanne Miles RN  Outcome: Progressing     Problem: ABCDS Injury Assessment  Goal: Absence of physical injury  7/18/2025 0830 by Susanne Miles RN  Outcome: Progressing     Problem: Pain  Goal: Verbalizes/displays adequate comfort level or baseline comfort level  7/18/2025 0830 by Susanne Miles RN  Outcome: Progressing     Problem: Nutrition Deficit:  Goal: Optimize nutritional status  7/18/2025 0830 by Susanne Miles RN  Outcome: Progressing

## 2025-07-18 NOTE — PROGRESS NOTES
OCCUPATIONAL THERAPY BEDSIDE TREATMENT NOTE  [facility]  [address] [place]. [place]    Date:2025  Patient Name: Sabrina Bajwa  MRN: [id number]  : 1955  Room: [address] [place] [address]       Evaluating OT: Kevin Quijano OTR/L; #[id number]      Referring Provider and Specific Provider Orders/Date:      07/10/25 2115   OT eval and treat  Start:  07/10/25 2115,   End:  07/10/25 2115,   ONE TIME,   Standing Count:  1 Occurrences,   R         Hi, Lisandra JONES, APRN - CNP         Placement Recommendation: Acute Rehab        Diagnosis:   1. Altered mental status, unspecified altered mental status type    2. Chronic renal impairment, stage 3 (moderate), unspecified whether stage 3a or 3b CKD (HCC)    3. Elevated troponin    4. Acute CVA (cerebrovascular accident) (HCC)         Surgery: None       Pertinent Medical History:       Past Medical History           Past Medical History:   Diagnosis Date    Arthritis      Asthma      Diabetes mellitus (HCC)      GERD (gastroesophageal reflux disease)      Hyperlipidemia      Hypertension      Immune deficiency disorder      MI, old     Microalbuminuria due to type 2 diabetes mellitus (HCC) 2016    Mitral valve regurgitation      Vitamin D deficiency 2016            Past Surgical History             Past Surgical History:   Procedure Laterality Date    BREAST SURGERY         reduction    lumpectomy    CHOLECYSTECTOMY, LAPAROSCOPIC N/A 2022     LAPAROSCOPIC CHOLECYSTECTOMY performed by Ernie Medina MD at [facility] OR    COLONOSCOPY        FRACTURE SURGERY         right thumb and tailbone    HYSTERECTOMY (CERVIX STATUS UNKNOWN)        PACEMAKER PLACEMENT        defibrillator at [facility]     UPPER GASTROINTESTINAL ENDOSCOPY N/A 2021     EGD ESOPHAGOGASTRODUODENOSCOPY ULTRASOUND performed by Ernie Medina MD at [facility] OR            Precautions:  Fall Risk, R UE and LE Flaccid, Expressive

## 2025-07-18 NOTE — PROGRESS NOTES
Physical Therapy   Treatment Note/Plan of Care    Room #:  0533/0533-02  Patient Name: Sabrina Bajwa  YOB: 1955  MRN: [id number]    Date of Service: 7/18/2025     Tentative placement recommendation: Acute Rehab with pt's daughter in agreement  Equipment recommendation: To be determined      Evaluating Physical Therapist: Lula Dickerson, PT #[id number]      Specific Provider Orders/Date/Referring PrPT eval and treat  Start:  07/10/25 2115,   End:  07/10/25 2115,   ONE TIME,   Standing Count:  1 Occurrences,   R       Lisandra Do, APRN - CNPovider :     Admitting Diagnosis:   Cerebral artery occlusion [I66.9]  Elevated troponin [R79.89]  Acute CVA (cerebrovascular accident) (HCC) [I63.9]  Altered mental status, unspecified altered mental status type [R41.82]  Chronic renal impairment, stage 3 (moderate), unspecified whether stage 3a or 3b CKD (HCC) [N18.30]    Pt is a 70 y.o. female adm 7/11/25 with acute change in mental status and R weakness Dx with cerebral artery occlusion. No MRI due to pacemaker insertion.       Visit Diagnoses         Codes      Altered mental status, unspecified altered mental status type    -  Primary R41.82      Chronic renal impairment, stage 3 (moderate), unspecified whether stage 3a or 3b CKD (HCC)     N18.30      Elevated troponin     R79.89            Patient Active Problem List   Diagnosis    Diabetes mellitus (HCC)    Hypertension    Asthma    Hyperlipidemia    Vitamin D deficiency    Microalbuminuria due to type 2 diabetes mellitus (HCC)    Enteritis    Intractable vomiting    Dilated cbd, acquired    Cerebral artery occlusion    Acute CVA (cerebrovascular accident) (HCC)    Acute encephalopathy    Right arm weakness    Asymptomatic occlusion of anterior cerebral artery        ASSESSMENT of Current Deficits Patient exhibits receptive & expressive aphasia with decreased strength, balance, and endurance impairing bed mobility, sitting balance, transfers, gait , and tolerance

## 2025-07-19 ENCOUNTER — APPOINTMENT (OUTPATIENT)
Dept: GENERAL RADIOLOGY | Age: 70
DRG: 064 | End: 2025-07-19
Payer: COMMERCIAL

## 2025-07-19 LAB
ANION GAP SERPL CALCULATED.3IONS-SCNC: 11 MMOL/L (ref 7–16)
BUN SERPL-MCNC: 30 MG/DL (ref 8–23)
CALCIUM SERPL-MCNC: 8.1 MG/DL (ref 8.8–10.2)
CHLORIDE SERPL-SCNC: 103 MMOL/L (ref 98–107)
CO2 SERPL-SCNC: 21 MMOL/L (ref 22–29)
CREAT SERPL-MCNC: 2.9 MG/DL (ref 0.5–1)
ERYTHROCYTE [DISTWIDTH] IN BLOOD BY AUTOMATED COUNT: 15.6 % (ref 11.5–15)
GFR, ESTIMATED: 17 ML/MIN/1.73M2
GLUCOSE BLD-MCNC: 146 MG/DL (ref 74–99)
GLUCOSE BLD-MCNC: 191 MG/DL (ref 74–99)
GLUCOSE BLD-MCNC: 194 MG/DL (ref 74–99)
GLUCOSE BLD-MCNC: 344 MG/DL (ref 74–99)
GLUCOSE SERPL-MCNC: 204 MG/DL (ref 74–99)
HCT VFR BLD AUTO: 35.5 % (ref 34–48)
HGB BLD-MCNC: 11.4 G/DL (ref 11.5–15.5)
INR PPP: 1.1
MAGNESIUM SERPL-MCNC: 1.3 MG/DL (ref 1.6–2.4)
MCH RBC QN AUTO: 27.5 PG (ref 26–35)
MCHC RBC AUTO-ENTMCNC: 32.1 G/DL (ref 32–34.5)
MCV RBC AUTO: 85.5 FL (ref 80–99.9)
PARTIAL THROMBOPLASTIN TIME: 35.1 SEC (ref 24.5–35.1)
PHOSPHATE SERPL-MCNC: 2.6 MG/DL (ref 2.5–4.5)
PLATELET # BLD AUTO: 209 K/UL (ref 130–450)
PMV BLD AUTO: 11.8 FL (ref 7–12)
POTASSIUM SERPL-SCNC: 4.2 MMOL/L (ref 3.5–5.1)
PROTHROMBIN TIME: 12.2 SEC (ref 9.3–12.4)
RBC # BLD AUTO: 4.15 M/UL (ref 3.5–5.5)
SODIUM SERPL-SCNC: 135 MMOL/L (ref 136–145)
WBC OTHER # BLD: 6.2 K/UL (ref 4.5–11.5)

## 2025-07-19 PROCEDURE — 6370000000 HC RX 637 (ALT 250 FOR IP): Performed by: INTERNAL MEDICINE

## 2025-07-19 PROCEDURE — 2580000003 HC RX 258: Performed by: INTERNAL MEDICINE

## 2025-07-19 PROCEDURE — 80048 BASIC METABOLIC PNL TOTAL CA: CPT

## 2025-07-19 PROCEDURE — 6370000000 HC RX 637 (ALT 250 FOR IP)

## 2025-07-19 PROCEDURE — 94640 AIRWAY INHALATION TREATMENT: CPT

## 2025-07-19 PROCEDURE — 82962 GLUCOSE BLOOD TEST: CPT

## 2025-07-19 PROCEDURE — 84100 ASSAY OF PHOSPHORUS: CPT

## 2025-07-19 PROCEDURE — 51798 US URINE CAPACITY MEASURE: CPT

## 2025-07-19 PROCEDURE — 71045 X-RAY EXAM CHEST 1 VIEW: CPT

## 2025-07-19 PROCEDURE — 6360000002 HC RX W HCPCS

## 2025-07-19 PROCEDURE — 97530 THERAPEUTIC ACTIVITIES: CPT

## 2025-07-19 PROCEDURE — 85730 THROMBOPLASTIN TIME PARTIAL: CPT

## 2025-07-19 PROCEDURE — 85610 PROTHROMBIN TIME: CPT

## 2025-07-19 PROCEDURE — 2060000000 HC ICU INTERMEDIATE R&B

## 2025-07-19 PROCEDURE — 83735 ASSAY OF MAGNESIUM: CPT

## 2025-07-19 PROCEDURE — 85027 COMPLETE CBC AUTOMATED: CPT

## 2025-07-19 RX ORDER — BUDESONIDE 0.5 MG/2ML
0.5 INHALANT ORAL
Status: DISCONTINUED | OUTPATIENT
Start: 2025-07-19 | End: 2025-07-21 | Stop reason: HOSPADM

## 2025-07-19 RX ORDER — IPRATROPIUM BROMIDE AND ALBUTEROL SULFATE 2.5; .5 MG/3ML; MG/3ML
1 SOLUTION RESPIRATORY (INHALATION)
Status: DISCONTINUED | OUTPATIENT
Start: 2025-07-19 | End: 2025-07-21 | Stop reason: HOSPADM

## 2025-07-19 RX ADMIN — CLOPIDOGREL BISULFATE 75 MG: 75 TABLET, FILM COATED ORAL at 09:35

## 2025-07-19 RX ADMIN — IPRATROPIUM BROMIDE AND ALBUTEROL SULFATE 1 DOSE: 2.5; .5 SOLUTION RESPIRATORY (INHALATION) at 19:33

## 2025-07-19 RX ADMIN — INSULIN LISPRO 6 UNITS: 100 INJECTION, SOLUTION INTRAVENOUS; SUBCUTANEOUS at 12:55

## 2025-07-19 RX ADMIN — SODIUM BICARBONATE 650 MG TABLET 1300 MG: at 09:35

## 2025-07-19 RX ADMIN — METHOCARBAMOL 750 MG: 500 TABLET ORAL at 14:55

## 2025-07-19 RX ADMIN — DEXTROSE AND SODIUM CHLORIDE: 5; .9 INJECTION, SOLUTION INTRAVENOUS at 08:33

## 2025-07-19 RX ADMIN — PANTOPRAZOLE SODIUM 40 MG: 40 TABLET, DELAYED RELEASE ORAL at 05:35

## 2025-07-19 RX ADMIN — DOCUSATE SODIUM 100 MG: 100 CAPSULE, LIQUID FILLED ORAL at 09:36

## 2025-07-19 RX ADMIN — METOPROLOL SUCCINATE 75 MG: 25 TABLET, EXTENDED RELEASE ORAL at 19:57

## 2025-07-19 RX ADMIN — ASPIRIN 81 MG: 81 TABLET, CHEWABLE ORAL at 09:36

## 2025-07-19 RX ADMIN — IPRATROPIUM BROMIDE AND ALBUTEROL SULFATE 1 DOSE: 2.5; .5 SOLUTION RESPIRATORY (INHALATION) at 14:11

## 2025-07-19 RX ADMIN — HYDRALAZINE HYDROCHLORIDE 25 MG: 25 TABLET ORAL at 19:56

## 2025-07-19 RX ADMIN — SODIUM BICARBONATE 650 MG TABLET 1300 MG: at 14:55

## 2025-07-19 RX ADMIN — HYDRALAZINE HYDROCHLORIDE 25 MG: 25 TABLET ORAL at 09:36

## 2025-07-19 RX ADMIN — LEVOTHYROXINE SODIUM 50 MCG: 0.05 TABLET ORAL at 05:35

## 2025-07-19 RX ADMIN — ATORVASTATIN CALCIUM 40 MG: 40 TABLET, FILM COATED ORAL at 09:36

## 2025-07-19 RX ADMIN — INSULIN LISPRO 2 UNITS: 100 INJECTION, SOLUTION INTRAVENOUS; SUBCUTANEOUS at 20:32

## 2025-07-19 RX ADMIN — INSULIN LISPRO 2 UNITS: 100 INJECTION, SOLUTION INTRAVENOUS; SUBCUTANEOUS at 17:54

## 2025-07-19 RX ADMIN — DOCUSATE SODIUM 100 MG: 100 CAPSULE, LIQUID FILLED ORAL at 19:57

## 2025-07-19 RX ADMIN — BUDESONIDE 500 MCG: 0.5 SUSPENSION RESPIRATORY (INHALATION) at 19:32

## 2025-07-19 RX ADMIN — SODIUM BICARBONATE 650 MG TABLET 1300 MG: at 19:56

## 2025-07-19 RX ADMIN — METOPROLOL SUCCINATE 75 MG: 25 TABLET, EXTENDED RELEASE ORAL at 09:35

## 2025-07-19 RX ADMIN — ACETAMINOPHEN 650 MG: 325 TABLET ORAL at 09:48

## 2025-07-19 ASSESSMENT — PAIN SCALES - GENERAL: PAINLEVEL_OUTOF10: 0

## 2025-07-19 NOTE — DIALYSIS
15:15 - Temp dialysis line pulled by Nanci, Dialysis RN after labs aPTT and INR were reviewed and verified that they are wnl.  2 stitches snipped and line pulled, pressure held until no further bleeding from site.  Pt tolerated well.  2x2 gauze folded and slight pressure applied to site with silk tape.  Report to Keli CORONADO, bedside RN.

## 2025-07-19 NOTE — PROGRESS NOTES
Physical Therapy   Treatment Note/Plan of Care    Room #:  0533/0533-02  Patient Name: Sabrina Bajwa  YOB: 1955  MRN: [id number]    Date of Service: 7/19/2025     Tentative placement recommendation: Acute Rehab with pt's daughter in agreement  Equipment recommendation: To be determined      Evaluating Physical Therapist: Lula Dickerson, PT #[id number]      Specific Provider Orders/Date/Referring PrPT eval and treat  Start:  07/10/25 2115,   End:  07/10/25 2115,   ONE TIME,   Standing Count:  1 Occurrences,   R       Lisandra Do, APRN - CNPovider :     Admitting Diagnosis:   Cerebral artery occlusion [I66.9]  Elevated troponin [R79.89]  Acute CVA (cerebrovascular accident) (HCC) [I63.9]  Altered mental status, unspecified altered mental status type [R41.82]  Chronic renal impairment, stage 3 (moderate), unspecified whether stage 3a or 3b CKD (HCC) [N18.30]    Pt is a 70 y.o. female adm 7/11/25 with acute change in mental status and R weakness Dx with cerebral artery occlusion. No MRI due to pacemaker insertion.       Visit Diagnoses         Codes      Altered mental status, unspecified altered mental status type    -  Primary R41.82      Chronic renal impairment, stage 3 (moderate), unspecified whether stage 3a or 3b CKD (HCC)     N18.30      Elevated troponin     R79.89            Patient Active Problem List   Diagnosis    Diabetes mellitus (HCC)    Hypertension    Asthma    Hyperlipidemia    Vitamin D deficiency    Microalbuminuria due to type 2 diabetes mellitus (HCC)    Enteritis    Intractable vomiting    Dilated cbd, acquired    Cerebral artery occlusion    Acute CVA (cerebrovascular accident) (HCC)    Acute encephalopathy    Right arm weakness    Asymptomatic occlusion of anterior cerebral artery        ASSESSMENT of Current Deficits Patient exhibits receptive & expressive aphasia with decreased strength, balance, and endurance impairing bed mobility, sitting balance, transfers, gait , and tolerance    Endurance: poor     Vitals: room air   Blood Pressure at rest  Blood Pressure during session    Heart Rate at rest  Heart Rate during session      Patient education  Patient/daughter educated on role of Physical Therapy, risks of immobility, safety and plan of care,  importance of mobility while in hospital , safety , positioning for skin integrity and comfort, and seated exercises. HEP explained to daughter     Patient response to education:   Pt verbalized understanding Pt demonstrated skill Pt requires further education in this area   Yes Partial Yes      Treatment:  Patient practiced and was instructed/facilitated in the following treatment: Patient assisted to edge of bed and  Sat edge of bed 20 minutes with moderate progressing to supervision to increase dynamic sitting balance and activity tolerance. Worked on trunk control and sitting balance. Trunk flexion and extension x 10 holding rail with left and WB into right, then x 10 without holding rail on left. Patient was able to maintain sitting balance with supervision. We worked on  and right UE passive push and pull while working on trunk control. Passive LAQ with verbal and tactile cues. Patient assisted to standing x 2. Patient assisted back to supine position, to head of bed and positioned for comfort and skin integrity. Patient's daughter says after patient rests, she will work on ROM exercises per previous instructions.      Therapeutic Exercises:  long arc quad, shoulder elevation, grasp/relax, and push and pull x 5- 10 while working on trunk control    At end of session, patient in bed with alarm and daughter present call light and phone within reach,  all lines and tubes intact, nursing notified.      Patient would benefit from continued skilled Physical Therapy to improve functional independence and quality of life.         Patient's/ family goals   rehab    Time in 1043  Time out 1116    Total Treatment Time  27 minutes      CPT

## 2025-07-19 NOTE — PROGRESS NOTES
Cardiology  Progress Note      SUBJECTIVE: Coherent speech.  No chest pain or shortness of breath at rest.  Still with right-sided weakness    Current Inpatient Medications  Current Facility-Administered Medications: docusate sodium (COLACE) capsule 100 mg, 100 mg, Oral, BID  sodium bicarbonate tablet 1,300 mg, 1,300 mg, Oral, TID  dextrose 5 % and 0.9 % sodium chloride infusion, , IntraVENous, Continuous  atorvastatin (LIPITOR) tablet 40 mg, 40 mg, Oral, Daily  hydrALAZINE (APRESOLINE) tablet 25 mg, 25 mg, Oral, TID  methocarbamol (ROBAXIN) tablet 750 mg, 750 mg, Oral, 4x Daily PRN  metoprolol succinate (TOPROL XL) extended release tablet 75 mg, 75 mg, Oral, BID  hydrALAZINE (APRESOLINE) injection 5 mg, 5 mg, IntraVENous, Q4H PRN  labetalol (NORMODYNE;TRANDATE) injection 5 mg, 5 mg, IntraVENous, Q4H PRN  sulfur hexafluoride microspheres (LUMASON) 60.7-25 MG injection 2 mL, 2 mL, IntraVENous, ONCE PRN  pantoprazole (PROTONIX) tablet 40 mg, 40 mg, Oral, QAM AC  acetaminophen (TYLENOL) tablet 650 mg, 650 mg, Oral, Q6H PRN  prochlorperazine (COMPAZINE) injection 10 mg, 10 mg, IntraVENous, Q6H PRN  melatonin disintegrating tablet 5 mg, 5 mg, Oral, Nightly PRN  aspirin chewable tablet 81 mg, 81 mg, Oral, Daily  clopidogrel (PLAVIX) tablet 75 mg, 75 mg, Oral, Daily  insulin lispro (HUMALOG,ADMELOG) injection vial 0-8 Units, 0-8 Units, SubCUTAneous, 4x Daily AC & HS  glucose chewable tablet 16 g, 4 tablet, Oral, PRN  dextrose bolus 10% 125 mL, 125 mL, IntraVENous, PRN **OR** dextrose bolus 10% 250 mL, 250 mL, IntraVENous, PRN  glucagon injection 1 mg, 1 mg, SubCUTAneous, PRN  dextrose 10 % infusion, , IntraVENous, Continuous PRN  levothyroxine (SYNTHROID) tablet 50 mcg, 50 mcg, Oral, Daily      Physical  VITALS:  /82   Pulse 73   Temp 98.3 °F (36.8 °C) (Oral)   Resp 10   Ht 1.549 m (5' 0.98\")   Wt 87 kg (191 lb 12.8 oz)   SpO2 96%   BMI 36.26 kg/m²   CURRENT TEMPERATURE:  Temp: 98.3 °F (36.8

## 2025-07-19 NOTE — PROGRESS NOTES
Nephrology Progress note.    7/19/25-patient seen and examined.  Daughter at the bedside and both updated.  Daughter was many questions regarding her mother's chronic kidney disease and the degree of disease that the patient has.  Patient does awaken briefly attentive.    Vital SignsBP 133/78   Pulse 70   Temp 98.6 °F (37 °C) (Oral)   Resp 16   Ht 1.549 m (5' 0.98\")   Wt 87 kg (191 lb 12.8 oz)   SpO2 100%   BMI 36.26 kg/m²   24HR INTAKE/OUTPUT:    Intake/Output Summary (Last 24 hours) at 7/19/2025 1351  Last data filed at 7/19/2025 1204  Gross per 24 hour   Intake --   Output 1000 ml   Net -1000 ml          Physical  Exam      Neck: No JVD.  Temporary hemodialysis catheter in place on the right.  Lungs: Breath sounds decreased at the bases. No rales or ronchi.  Heart: Regular rate and rhythm. No S3 gallop. No murmrur.  Abdomen: Soft non distended, non tender and normal bowel sounds.  Extremeties: No bipedal  edema.          Medications:  Current Facility-Administered Medications   Medication Dose Route Frequency Provider Last Rate Last Admin    budesonide (PULMICORT) nebulizer suspension 500 mcg  0.5 mg Nebulization BID RT Yusuf Grijalva APRN - CNP        ipratropium 0.5 mg-albuterol 2.5 mg (DUONEB) nebulizer solution 1 Dose  1 Dose Inhalation 4x Daily RT Yusuf Grijalva APRN - CNP        docusate sodium (COLACE) capsule 100 mg  100 mg Oral BID Yusuf Grijalva APRN - CNP   100 mg at 07/19/25 0936    sodium bicarbonate tablet 1,300 mg  1,300 mg Oral TID Ron Ahumada MD   1,300 mg at 07/19/25 0935    dextrose 5 % and 0.9 % sodium chloride infusion   IntraVENous Continuous Ron Ahumada MD 80 mL/hr at 07/19/25 0833 New Bag at 07/19/25 0833    atorvastatin (LIPITOR) tablet 40 mg  40 mg Oral Daily Lisandra Do APRN - CNP   40 mg at 07/19/25 0936    hydrALAZINE (APRESOLINE) tablet 25 mg  25 mg Oral TID Lisandra Do APRN - CNP   25 mg at 07/19/25 0936    methocarbamol (ROBAXIN) tablet 750 mg  750 mg

## 2025-07-19 NOTE — PLAN OF CARE
Problem: Chronic Conditions and Co-morbidities  Goal: Patient's chronic conditions and co-morbidity symptoms are monitored and maintained or improved  7/19/2025 1138 by Kelsey Barbosa RN  Outcome: Progressing  7/19/2025 0520 by Mary Lovett RN  Outcome: Progressing     Problem: Discharge Planning  Goal: Discharge to home or other facility with appropriate resources  7/19/2025 1138 by Kelsey Barbosa RN  Outcome: Progressing  7/19/2025 0520 by Mary Lovett RN  Outcome: Progressing     Problem: Safety - Adult  Goal: Free from fall injury  7/19/2025 1138 by Kelsey Barbosa RN  Outcome: Progressing  7/19/2025 0520 by Mary Lovett RN  Outcome: Progressing     Problem: Skin/Tissue Integrity  Goal: Skin integrity remains intact  Description: 1.  Monitor for areas of redness and/or skin breakdown  2.  Assess vascular access sites hourly  3.  Every 4-6 hours minimum:  Change oxygen saturation probe site  4.  Every 4-6 hours:  If on nasal continuous positive airway pressure, respiratory therapy assess nares and determine need for appliance change or resting period  7/19/2025 1138 by Kelsey Barbosa RN  Outcome: Progressing  7/19/2025 0520 by Mary Lovett RN  Outcome: Progressing     Problem: ABCDS Injury Assessment  Goal: Absence of physical injury  7/19/2025 1138 by Kelsey Barbsoa RN  Outcome: Progressing  7/19/2025 0520 by Mary Lovett RN  Outcome: Progressing     Problem: Pain  Goal: Verbalizes/displays adequate comfort level or baseline comfort level  7/19/2025 1138 by Kelsey Barbosa RN  Outcome: Progressing  7/19/2025 0520 by Mary Lovett RN  Outcome: Progressing     Problem: Nutrition Deficit:  Goal: Optimize nutritional status  7/19/2025 1138 by Kelsey Barbosa RN  Outcome: Progressing  7/19/2025 0520 by Mary Lovett RN  Outcome: Progressing

## 2025-07-19 NOTE — PROGRESS NOTES
Internal Medicine Consult Note    [facility]    Vishal Jarrett D.O., VICI.                    Cecil Laureano D.O., SANDRA Merida D.O.     Mahamed Kebede, MSN, APRN-CNP  Yusuf Grijalva, MSN, APRN-CNP  Lisandra Do, MSN. APRN-NP-C  Linda Kim MSN, APRN, ACNP-AG     Primary Care Physician: Jesse Rojas DO   Admitting Physician:  Vishal Jarrett DO  Admission date and time: 7/10/2025 12:36 PM    Room:  [address] [place] [address]  Admitting diagnosis: Cerebral artery occlusion [I66.9]  Elevated troponin [R79.89]  Acute CVA (cerebrovascular accident) (HCC) [I63.9]  Altered mental status, unspecified altered mental status type [R41.82]  Chronic renal impairment, stage 3 (moderate), unspecified whether stage 3a or 3b CKD (HCC) [N18.30]    Patient Name: Sabrina Bajwa  MRN: [id number]    Date of Service: 7/19/2025     Subjective:  Sabrina is a 70 y.o. female who was seen and examined today,7/19/2025, at the bedside.  Sabrina is currently working with physical therapy.,  Her daughter is currently at the bedside.  She stood at the bedside twice with a two-person assist.  She remains flaccid in her right arm and leg but able to move fingers on her right hand and grasp.  Her speech remains clear until she becomes frustrated.  Continue waiting for bed availability in [facility].    Review of System: Limited  Constitutional:   Does not report fever or chills, weight loss or gain, positive fatigue  HEENT:   Does not report  ear pain, sore throat, sinus or eye problems.  Cardiovascular:   Does not report any chest pain, or palpitations.   Respiratory:   Does not report  shortness of breath, coughing, sputum production, hemoptysis, or wheezing.  Gastrointestinal:   Does not report  nausea, vomiting, diarrhea, or constipation.  Denies any abdominal pain.  She is tolerating her current diet without evidence of aspiration  Genitourinary:    Does not report any

## 2025-07-20 VITALS
HEIGHT: 61 IN | OXYGEN SATURATION: 96 % | BODY MASS INDEX: 36.21 KG/M2 | RESPIRATION RATE: 18 BRPM | SYSTOLIC BLOOD PRESSURE: 114 MMHG | HEART RATE: 83 BPM | DIASTOLIC BLOOD PRESSURE: 61 MMHG | WEIGHT: 191.8 LBS | TEMPERATURE: 98.1 F

## 2025-07-20 LAB
ANION GAP SERPL CALCULATED.3IONS-SCNC: 12 MMOL/L (ref 7–16)
BASOPHILS # BLD: 0.06 K/UL (ref 0–0.2)
BASOPHILS NFR BLD: 1 % (ref 0–2)
BUN SERPL-MCNC: 29 MG/DL (ref 8–23)
CALCIUM SERPL-MCNC: 8 MG/DL (ref 8.8–10.2)
CHLORIDE SERPL-SCNC: 105 MMOL/L (ref 98–107)
CO2 SERPL-SCNC: 17 MMOL/L (ref 22–29)
CREAT SERPL-MCNC: 2.7 MG/DL (ref 0.5–1)
EOSINOPHIL # BLD: 0.81 K/UL (ref 0.05–0.5)
EOSINOPHILS RELATIVE PERCENT: 13 % (ref 0–6)
ERYTHROCYTE [DISTWIDTH] IN BLOOD BY AUTOMATED COUNT: 15.9 % (ref 11.5–15)
GFR, ESTIMATED: 18 ML/MIN/1.73M2
GLUCOSE BLD-MCNC: 160 MG/DL (ref 74–99)
GLUCOSE BLD-MCNC: 186 MG/DL (ref 74–99)
GLUCOSE SERPL-MCNC: 120 MG/DL (ref 74–99)
HCT VFR BLD AUTO: 33.9 % (ref 34–48)
HGB BLD-MCNC: 10.6 G/DL (ref 11.5–15.5)
LYMPHOCYTES NFR BLD: 1.18 K/UL (ref 1.5–4)
LYMPHOCYTES RELATIVE PERCENT: 19 % (ref 20–42)
MAGNESIUM SERPL-MCNC: 1.3 MG/DL (ref 1.6–2.4)
MCH RBC QN AUTO: 27.5 PG (ref 26–35)
MCHC RBC AUTO-ENTMCNC: 31.3 G/DL (ref 32–34.5)
MCV RBC AUTO: 88.1 FL (ref 80–99.9)
MONOCYTES NFR BLD: 0.43 K/UL (ref 0.1–0.95)
MONOCYTES NFR BLD: 7 % (ref 2–12)
NEUTROPHILS NFR BLD: 60 % (ref 43–80)
NEUTS SEG NFR BLD: 3.72 K/UL (ref 1.8–7.3)
PHOSPHATE SERPL-MCNC: 2.8 MG/DL (ref 2.5–4.5)
PLATELET # BLD AUTO: 219 K/UL (ref 130–450)
PMV BLD AUTO: 12.1 FL (ref 7–12)
POTASSIUM SERPL-SCNC: 4.9 MMOL/L (ref 3.5–5.1)
RBC # BLD AUTO: 3.85 M/UL (ref 3.5–5.5)
SODIUM SERPL-SCNC: 134 MMOL/L (ref 136–145)
WBC OTHER # BLD: 6.2 K/UL (ref 4.5–11.5)

## 2025-07-20 PROCEDURE — 6360000002 HC RX W HCPCS

## 2025-07-20 PROCEDURE — 36415 COLL VENOUS BLD VENIPUNCTURE: CPT

## 2025-07-20 PROCEDURE — 2580000003 HC RX 258: Performed by: INTERNAL MEDICINE

## 2025-07-20 PROCEDURE — 6370000000 HC RX 637 (ALT 250 FOR IP): Performed by: NURSE PRACTITIONER

## 2025-07-20 PROCEDURE — 6370000000 HC RX 637 (ALT 250 FOR IP)

## 2025-07-20 PROCEDURE — 84100 ASSAY OF PHOSPHORUS: CPT

## 2025-07-20 PROCEDURE — 85025 COMPLETE CBC W/AUTO DIFF WBC: CPT

## 2025-07-20 PROCEDURE — 97530 THERAPEUTIC ACTIVITIES: CPT

## 2025-07-20 PROCEDURE — 83735 ASSAY OF MAGNESIUM: CPT

## 2025-07-20 PROCEDURE — 80048 BASIC METABOLIC PNL TOTAL CA: CPT

## 2025-07-20 PROCEDURE — 6370000000 HC RX 637 (ALT 250 FOR IP): Performed by: INTERNAL MEDICINE

## 2025-07-20 PROCEDURE — 94640 AIRWAY INHALATION TREATMENT: CPT

## 2025-07-20 PROCEDURE — 82962 GLUCOSE BLOOD TEST: CPT

## 2025-07-20 RX ORDER — LEVOTHYROXINE SODIUM 50 UG/1
50 TABLET ORAL DAILY
DISCHARGE
Start: 2025-07-21

## 2025-07-20 RX ORDER — BUMETANIDE 1 MG/1
1 TABLET ORAL DAILY
DISCHARGE
Start: 2025-07-20

## 2025-07-20 RX ORDER — MECOBALAMIN 5000 MCG
5 TABLET,DISINTEGRATING ORAL NIGHTLY PRN
DISCHARGE
Start: 2025-07-20

## 2025-07-20 RX ORDER — BUDESONIDE 0.5 MG/2ML
0.5 INHALANT ORAL
DISCHARGE
Start: 2025-07-21

## 2025-07-20 RX ORDER — PANTOPRAZOLE SODIUM 40 MG/1
40 TABLET, DELAYED RELEASE ORAL
DISCHARGE
Start: 2025-07-21

## 2025-07-20 RX ORDER — IPRATROPIUM BROMIDE AND ALBUTEROL SULFATE 2.5; .5 MG/3ML; MG/3ML
3 SOLUTION RESPIRATORY (INHALATION)
DISCHARGE
Start: 2025-07-21

## 2025-07-20 RX ORDER — SODIUM BICARBONATE 650 MG/1
1300 TABLET ORAL 3 TIMES DAILY
DISCHARGE
Start: 2025-07-20

## 2025-07-20 RX ORDER — CLOPIDOGREL BISULFATE 75 MG/1
75 TABLET ORAL DAILY
DISCHARGE
Start: 2025-07-21

## 2025-07-20 RX ADMIN — IPRATROPIUM BROMIDE AND ALBUTEROL SULFATE 1 DOSE: 2.5; .5 SOLUTION RESPIRATORY (INHALATION) at 09:48

## 2025-07-20 RX ADMIN — DOCUSATE SODIUM 100 MG: 100 CAPSULE, LIQUID FILLED ORAL at 09:45

## 2025-07-20 RX ADMIN — HYDRALAZINE HYDROCHLORIDE 25 MG: 25 TABLET ORAL at 09:45

## 2025-07-20 RX ADMIN — IPRATROPIUM BROMIDE AND ALBUTEROL SULFATE 1 DOSE: 2.5; .5 SOLUTION RESPIRATORY (INHALATION) at 05:31

## 2025-07-20 RX ADMIN — METHOCARBAMOL 750 MG: 500 TABLET ORAL at 14:44

## 2025-07-20 RX ADMIN — CLOPIDOGREL BISULFATE 75 MG: 75 TABLET, FILM COATED ORAL at 09:45

## 2025-07-20 RX ADMIN — PANTOPRAZOLE SODIUM 40 MG: 40 TABLET, DELAYED RELEASE ORAL at 06:49

## 2025-07-20 RX ADMIN — LEVOTHYROXINE SODIUM 50 MCG: 0.05 TABLET ORAL at 06:49

## 2025-07-20 RX ADMIN — BUDESONIDE 500 MCG: 0.5 SUSPENSION RESPIRATORY (INHALATION) at 05:31

## 2025-07-20 RX ADMIN — SODIUM BICARBONATE 650 MG TABLET 1300 MG: at 09:44

## 2025-07-20 RX ADMIN — BUDESONIDE 500 MCG: 0.5 SUSPENSION RESPIRATORY (INHALATION) at 17:14

## 2025-07-20 RX ADMIN — METOPROLOL SUCCINATE 75 MG: 25 TABLET, EXTENDED RELEASE ORAL at 09:44

## 2025-07-20 RX ADMIN — ATORVASTATIN CALCIUM 40 MG: 40 TABLET, FILM COATED ORAL at 09:45

## 2025-07-20 RX ADMIN — DEXTROSE AND SODIUM CHLORIDE: 5; .9 INJECTION, SOLUTION INTRAVENOUS at 08:07

## 2025-07-20 RX ADMIN — IPRATROPIUM BROMIDE AND ALBUTEROL SULFATE 1 DOSE: 2.5; .5 SOLUTION RESPIRATORY (INHALATION) at 17:14

## 2025-07-20 RX ADMIN — IPRATROPIUM BROMIDE AND ALBUTEROL SULFATE 1 DOSE: 2.5; .5 SOLUTION RESPIRATORY (INHALATION) at 14:00

## 2025-07-20 RX ADMIN — INSULIN LISPRO 2 UNITS: 100 INJECTION, SOLUTION INTRAVENOUS; SUBCUTANEOUS at 12:30

## 2025-07-20 RX ADMIN — GUAIFENESIN, DEXTROMETHORPHAN HBR 1 TABLET: 600; 30 TABLET ORAL at 14:45

## 2025-07-20 RX ADMIN — ASPIRIN 81 MG: 81 TABLET, CHEWABLE ORAL at 09:44

## 2025-07-20 RX ADMIN — SODIUM BICARBONATE 650 MG TABLET 1300 MG: at 14:45

## 2025-07-20 ASSESSMENT — PAIN SCALES - GENERAL: PAINLEVEL_OUTOF10: 0

## 2025-07-20 NOTE — PROGRESS NOTES
Physical Therapy   Treatment Note/Plan of Care    Room #:  0533/0533-02  Patient Name: Sabrina Bajaw  YOB: 1955  MRN: [id number]    Date of Service: 7/20/2025     Tentative placement recommendation: Acute Rehab with pt's daughter in agreement  Equipment recommendation: To be determined      Evaluating Physical Therapist: Lula Dickerson, PT #[id number]      Specific Provider Orders/Date/Referring PrPT eval and treat  Start:  07/10/25 2115,   End:  07/10/25 2115,   ONE TIME,   Standing Count:  1 Occurrences,   R       Lisandra Do, APRN - CNPovider :     Admitting Diagnosis:   Cerebral artery occlusion [I66.9]  Elevated troponin [R79.89]  Acute CVA (cerebrovascular accident) (HCC) [I63.9]  Altered mental status, unspecified altered mental status type [R41.82]  Chronic renal impairment, stage 3 (moderate), unspecified whether stage 3a or 3b CKD (HCC) [N18.30]    Pt is a 70 y.o. female adm 7/11/25 with acute change in mental status and R weakness Dx with cerebral artery occlusion. No MRI due to pacemaker insertion.       Visit Diagnoses         Codes      Altered mental status, unspecified altered mental status type    -  Primary R41.82      Chronic renal impairment, stage 3 (moderate), unspecified whether stage 3a or 3b CKD (HCC)     N18.30      Elevated troponin     R79.89            Patient Active Problem List   Diagnosis    Diabetes mellitus (HCC)    Hypertension    Asthma    Hyperlipidemia    Vitamin D deficiency    Microalbuminuria due to type 2 diabetes mellitus (HCC)    Enteritis    Intractable vomiting    Dilated cbd, acquired    Cerebral artery occlusion    Acute CVA (cerebrovascular accident) (HCC)    Acute encephalopathy    Right arm weakness    Asymptomatic occlusion of anterior cerebral artery        ASSESSMENT of Current Deficits Patient exhibits receptive & expressive aphasia with decreased strength, balance, and endurance impairing bed mobility, sitting balance, transfers, gait , and tolerance

## 2025-07-20 NOTE — DISCHARGE INSTR - COC
Continuity of Care Form    Patient Name: Sabrina Bajwa   :  1955  MRN:  [id number]    Admit date:  7/10/2025  Discharge date:  2025    Code Status Order: Full Code   Advance Directives:     Admitting Physician:  Vishal Jarrett DO  PCP: Jesse Rojas DO    Discharging Nurse: [facility] Unit/Room#: 0533/0533-02  Discharging Unit Phone Number: [phone]    Emergency Contact:   Extended Emergency Contact Information  Primary Emergency Contact: Selma Bajwa  Address:  [address]  Home Phone: [phone]  Mobile Phone: [phone]  Relation: Child  Secondary Emergency Contact: RandolphKyra   [facility]  Home Phone: [phone]  Relation: Brother/Sister    Past Surgical History:  Past Surgical History:   Procedure Laterality Date    BREAST SURGERY      reduction    lumpectomy    CHOLECYSTECTOMY, LAPAROSCOPIC N/A 2022    LAPAROSCOPIC CHOLECYSTECTOMY performed by Ernie Medina MD at [facility] OR    COLONOSCOPY      FRACTURE SURGERY      right thumb and tailbone    HYSTERECTOMY (CERVIX STATUS UNKNOWN)      PACEMAKER PLACEMENT      defibrillator at [facility]     UPPER GASTROINTESTINAL ENDOSCOPY N/A 2021    EGD ESOPHAGOGASTRODUODENOSCOPY ULTRASOUND performed by Ernie Medina MD at [facility] OR       Immunization History:   Immunization History   Administered Date(s) Administered    COVID-19, MODERNA BLUE border, Primary or Immunocompromised, (age 12y+), IM, 100 mcg/0.5mL 2021, 2021, 2021    Influenza Virus Vaccine 2015       Active Problems:  Patient Active Problem List   Diagnosis Code    Diabetes mellitus (HCC) E11.9    Hypertension I10    Asthma J45.909    Hyperlipidemia E78.5    Vitamin D deficiency E55.9    Microalbuminuria due to type 2 diabetes mellitus (HCC) E11.29, R80.9    Enteritis K52.9    Intractable vomiting R11.10    Dilated cbd, acquired K83.8

## 2025-07-20 NOTE — PROGRESS NOTES
Nephrology Progress note.    7/20/25-patient seen and examined.  Daughter at the bedside and both updated.  She was eating breakfast at the time of my visit.  She denied any nausea or vomiting.  She had been constipated but did have a bowel movement today.  Temporary dialysis catheter was pulled from right IJ on 7/19.        Vital SignsBP 113/60   Pulse 95   Temp 98.1 °F (36.7 °C) (Axillary)   Resp 18   Ht 1.549 m (5' 0.98\")   Wt 87 kg (191 lb 12.8 oz)   SpO2 96%   BMI 36.26 kg/m²   24HR INTAKE/OUTPUT:    Intake/Output Summary (Last 24 hours) at 7/20/2025 1108  Last data filed at 7/20/2025 0954  Gross per 24 hour   Intake 1261.79 ml   Output 2200 ml   Net -938.21 ml          Physical  Exam      Neck: No JVD.  Dressing noted where previous temporary dialysis catheter was, dry and intact  Lungs: Breath sounds decreased at the bases. No rales or ronchi.  Heart: Regular rate and rhythm. No S3 gallop. No murmrur.  Abdomen: Soft non distended, non tender and normal bowel sounds.  Extremeties: No bipedal  edema.          Medications:  Current Facility-Administered Medications   Medication Dose Route Frequency Provider Last Rate Last Admin    dextromethorphan-guaiFENesin (MUCINEX DM)  MG per extended release tablet 1 tablet  1 tablet Oral BID PRN Mahamed Kebede APRN - CNP        budesonide (PULMICORT) nebulizer suspension 500 mcg  0.5 mg Nebulization BID RT Yusuf Grijalva APRN - CNP   500 mcg at 07/20/25 0531    ipratropium 0.5 mg-albuterol 2.5 mg (DUONEB) nebulizer solution 1 Dose  1 Dose Inhalation 4x Daily RT Yusuf Grijalva APRN - CNP   1 Dose at 07/20/25 0948    docusate sodium (COLACE) capsule 100 mg  100 mg Oral BID Yusuf Grijalva APRN - CNP   100 mg at 07/20/25 0945    sodium bicarbonate tablet 1,300 mg  1,300 mg Oral TID Ron Ahumada MD   1,300 mg at 07/20/25 0944    dextrose 5 % and 0.9 % sodium chloride infusion   IntraVENous Continuous Ron Ahumada MD 80 mL/hr at 07/20/25 0807 New Bag at

## 2025-07-20 NOTE — PROGRESS NOTES
Cardiology  Progress Note      SUBJECTIVE:  No chest pain. No dyspnea.  Flat affect.  Alert and awake and able to answer questions clearly.  Coherent speech.  Still with significant weakness in the right side of her body    Current Inpatient Medications  Current Facility-Administered Medications: budesonide (PULMICORT) nebulizer suspension 500 mcg, 0.5 mg, Nebulization, BID RT  ipratropium 0.5 mg-albuterol 2.5 mg (DUONEB) nebulizer solution 1 Dose, 1 Dose, Inhalation, 4x Daily RT  docusate sodium (COLACE) capsule 100 mg, 100 mg, Oral, BID  sodium bicarbonate tablet 1,300 mg, 1,300 mg, Oral, TID  dextrose 5 % and 0.9 % sodium chloride infusion, , IntraVENous, Continuous  atorvastatin (LIPITOR) tablet 40 mg, 40 mg, Oral, Daily  hydrALAZINE (APRESOLINE) tablet 25 mg, 25 mg, Oral, TID  methocarbamol (ROBAXIN) tablet 750 mg, 750 mg, Oral, 4x Daily PRN  metoprolol succinate (TOPROL XL) extended release tablet 75 mg, 75 mg, Oral, BID  hydrALAZINE (APRESOLINE) injection 5 mg, 5 mg, IntraVENous, Q4H PRN  labetalol (NORMODYNE;TRANDATE) injection 5 mg, 5 mg, IntraVENous, Q4H PRN  sulfur hexafluoride microspheres (LUMASON) 60.7-25 MG injection 2 mL, 2 mL, IntraVENous, ONCE PRN  pantoprazole (PROTONIX) tablet 40 mg, 40 mg, Oral, QAM AC  acetaminophen (TYLENOL) tablet 650 mg, 650 mg, Oral, Q6H PRN  prochlorperazine (COMPAZINE) injection 10 mg, 10 mg, IntraVENous, Q6H PRN  melatonin disintegrating tablet 5 mg, 5 mg, Oral, Nightly PRN  aspirin chewable tablet 81 mg, 81 mg, Oral, Daily  clopidogrel (PLAVIX) tablet 75 mg, 75 mg, Oral, Daily  insulin lispro (HUMALOG,ADMELOG) injection vial 0-8 Units, 0-8 Units, SubCUTAneous, 4x Daily AC & HS  glucose chewable tablet 16 g, 4 tablet, Oral, PRN  dextrose bolus 10% 125 mL, 125 mL, IntraVENous, PRN **OR** dextrose bolus 10% 250 mL, 250 mL, IntraVENous, PRN  glucagon injection 1 mg, 1 mg, SubCUTAneous, PRN  dextrose 10 % infusion, , IntraVENous, Continuous PRN  levothyroxine (SYNTHROID)

## 2025-07-21 NOTE — DISCHARGE SUMMARY
Internal Medicine Progress Note     [facility]     Vishal Jarrett D.O., ANUPAM.                         Cecil Laureano D.O., SANDRA Merida D.O.   __________________________________________    Mahamed Kebede, MSN, APRN-CNP  Yusuf Grijalva, MSN, APRN, NP-C  Lisandra Do, MSN, APRN, NP-C  Linda Kim, MSN, APRN, NP-C       Internal Medicine  Discharge Summary    NAME: Sabrina Bajwa  :  1955  MRN:  [id number]  PCP:Jesse Rojas DO  ADMITTED: 7/10/2025      DISCHARGED: 25    ADMITTING PHYSICIAN: No att. providers found    CONSULTANT(S):   IP CONSULT TO CARDIOLOGY  IP CONSULT TO NEPHROLOGY  IP CONSULT TO CRITICAL CARE  IP CONSULT TO DIABETES EDUCATOR     ADMITTING DIAGNOSIS:   Cerebral artery occlusion [I66.9]  Elevated troponin [R79.89]  Acute CVA (cerebrovascular accident) (HCC) [I63.9]  Altered mental status, unspecified altered mental status type [R41.82]  Chronic renal impairment, stage 3 (moderate), unspecified whether stage 3a or 3b CKD (HCC) [N18.30]     DISCHARGE DIAGNOSES:   Acute CVA (A3 segment occulusion of LACA) with resultant expressive aphasia, motor and sensory (questionable sensation to pressure of RUE) loss to the right upper and lower extremity  Nonischemic cardiomyopathy with elevated troponin secondary to demand ischemia from neurologic event with type II non-STEMI  Chronic, compensated congestive heart failure with markedly reduced ejection fraction of 20 to 25% with valvular heart disease that includes aortic valve stenosis, mitral valve regurgitation, and moderate to severe tricuspid regurgitation  LYDIA on CKD stage IV secondary to contrast nephropathy status post dialysis catheter insertion and the institution of hemodialysis status post removal of dialysis catheter 2025  Biventricular pacemaker in place with MRI compatibility but unable to be performed in the setting of \"end-of-life  RT    docusate sodium  100 mg Oral BID    sodium bicarbonate  1,300 mg Oral TID    atorvastatin  40 mg Oral Daily    hydrALAZINE  25 mg Oral TID    metoprolol succinate  75 mg Oral BID    pantoprazole  40 mg Oral QAM AC    aspirin  81 mg Oral Daily    clopidogrel  75 mg Oral Daily    insulin lispro  0-8 Units SubCUTAneous 4x Daily AC & HS    levothyroxine  50 mcg Oral Daily         Continuous Infusions:  Infusions Meds[]Expand by Default    dextrose               FOLLOW UP/INSTRUCTIONS:  This patient is instructed to follow-up with her primary care physician.  Patient is instructed to follow-up with the consults listed above as directed by them.  she is instructed to resume home medications and take new medications as indicated in the list above.  If the patient has a recurrence of symptoms, she is instructed to go to the ED.    Preparing for this patient's discharge, including paperwork, orders, instructions, and meeting with patient did require > 40 minutes.    JADE Grande CNP     7/21/2025  1:21 PM

## (undated) PROCEDURE — 5A1D70Z PERFORMANCE OF URINARY FILTRATION, INTERMITTENT, LESS THAN 6 HOURS PER DAY: ICD-10-PCS

## (undated) DEVICE — ELECTRODE PT RET AD L9FT HI MOIST COND ADH HYDRGEL CORDED

## (undated) DEVICE — GARMENT,MEDLINE,DVT,INT,CALF,MED, GEN2: Brand: MEDLINE

## (undated) DEVICE — SPONGE GZ 4IN 4IN 4 PLY N WVN AVANT

## (undated) DEVICE — SET ENDO INSTR LAPAROSCOPIC INCISIONAL

## (undated) DEVICE — MEDIA CONTRAST ISOVUE GLASS VIALS 250 50ML

## (undated) DEVICE — PMI PTFE COATED LAPAROSCOPIC WIRE L-HOOK 33 CM: Brand: PMI

## (undated) DEVICE — SHEET,DRAPE,40X58,STERILE: Brand: MEDLINE

## (undated) DEVICE — 20 ML SYRINGE REGULAR TIP: Brand: MONOJECT

## (undated) DEVICE — KENDALL 450 SERIES MONITORING FOAM ELECTRODE - RECTANGULAR SHAPE ( 3/PK): Brand: KENDALL

## (undated) DEVICE — GOWN ISOLATN REG YEL M WT MULTIPLY SIDETIE LEV 2

## (undated) DEVICE — SET ENDO INSTR RED YEL LAPAROSCOPIC

## (undated) DEVICE — [HIGH FLOW INSUFFLATOR,  DO NOT USE IF PACKAGE IS DAMAGED,  KEEP DRY,  KEEP AWAY FROM SUNLIGHT,  PROTECT FROM HEAT AND RADIOACTIVE SOURCES.]: Brand: PNEUMOSURE

## (undated) DEVICE — SYSTEM INJ BILI RAP REFIL CONT

## (undated) DEVICE — GLOVE ORANGE PI 7 1/2   MSG9075

## (undated) DEVICE — Device: Brand: BALLOON3

## (undated) DEVICE — INSUFFLATION NEEDLE TO ESTABLISH PNEUMOPERITONEUM.: Brand: INSUFFLATION NEEDLE

## (undated) DEVICE — TOWEL,OR,DSP,ST,BLUE,STD,6/PK,12PK/CS: Brand: MEDLINE

## (undated) DEVICE — BLOCK BITE 60FR CAREGUARD

## (undated) DEVICE — SYRINGE MED 10ML LUERLOCK TIP W/O SFTY DISP

## (undated) DEVICE — MARKER,SKIN,WI/RULER AND LABELS: Brand: MEDLINE

## (undated) DEVICE — MASK,FACE,MAXFLUIDPROTECT,SHIELD/ERLPS: Brand: MEDLINE

## (undated) DEVICE — ENDOSCOPIC ULTRASOUND FINE NEEDLE BIOPSY (FNB) DEVICE: Brand: ACQUIRE

## (undated) DEVICE — APPLIER CLP M L L11.4IN DIA10MM ENDOSCP ROT MULT FOR LIG

## (undated) DEVICE — SPHINCTEROTOME: Brand: HYDRATOME RX 44

## (undated) DEVICE — TROCAR: Brand: KII SLEEVE

## (undated) DEVICE — DOUBLE BASIN SET: Brand: MEDLINE INDUSTRIES, INC.

## (undated) DEVICE — CONTAINER SPEC COLL 960ML POLYPR TRIANG GRAD INTAKE/OUTPUT

## (undated) DEVICE — APPLICATOR MEDICATED 26 CC SOLUTION HI LT ORNG CHLORAPREP

## (undated) DEVICE — 6 X 9  1.75MIL 4-WALL LABGUARD: Brand: MINIGRIP COMMERCIAL LLC

## (undated) DEVICE — Device: Brand: DEFENDO VALVE AND CONNECTOR KIT

## (undated) DEVICE — TUBING, SUCTION, 1/4" X 10', STRAIGHT: Brand: MEDLINE

## (undated) DEVICE — PACK SURG LAP CHOLE CUSTOM

## (undated) DEVICE — SYSTEM BX CAP BILI RAP EXCHG CAP LOK DEV COMPATIBLE W/ OLY

## (undated) DEVICE — YANKAUER,BULB TIP,W/O VENT,RIGID,STERILE: Brand: MEDLINE

## (undated) DEVICE — COVER,LIGHT HANDLE,FLX,1/PK: Brand: MEDLINE INDUSTRIES, INC.

## (undated) DEVICE — LAPAROSCOPIC SCISSORS: Brand: EPIX LAPAROSCOPIC SCISSORS

## (undated) DEVICE — Z INACTIVE USE 2660664 SOLUTION IRRIG 3000ML 0.9% SOD CHL USP UROMATIC PLAS CONT

## (undated) DEVICE — NEEDLE HYPO 25GA L1.5IN BLU POLYPR HUB S STL REG BVL STR

## (undated) DEVICE — SYRINGE 20ML LL S/C 50

## (undated) DEVICE — KIT BEDSIDE REVITAL OX 500ML

## (undated) DEVICE — CAMERA STRYKER 1488 HD GEN

## (undated) DEVICE — GLOVE SURG SZ 65 THK91MIL LTX FREE SYN POLYISOPRENE

## (undated) DEVICE — PUMP SUC IRR TBNG L10FT W/ HNDPC ASSEMB STRYKEFLOW 2

## (undated) DEVICE — GOWN,SIRUS,NONRNF,SETINSLV,XL,20/CS: Brand: MEDLINE

## (undated) DEVICE — SYRINGE MED 10ML TRNSLUC BRL PLUNG BLK MRK POLYPR CTRL

## (undated) DEVICE — TROCAR: Brand: KII FIOS FIRST ENTRY

## (undated) DEVICE — ANCHOR TISSUE RETRIEVAL SYSTEM, BAG SIZE 175 ML, PORT SIZE 10 MM: Brand: ANCHOR TISSUE RETRIEVAL SYSTEM

## (undated) DEVICE — LUBRICANT SURG JELLY ST BACTER TUBE 4.25OZ

## (undated) DEVICE — SKIN AFFIX SURG ADHESIVE 72/CS 0.55ML: Brand: MEDLINE

## (undated) DEVICE — VALVE SUCTION AIR H2O HYDR H2O JET CONN STRL ORCA POD + DISP

## (undated) DEVICE — PLUMEPORT LAPAROSCOPIC SMOKE FILTRATION DEVICE: Brand: PLUMEPORT ACTIV

## (undated) DEVICE — PMI PTFE COATED LAPAROSCOPIC WIRE L-HOOK 44 CM: Brand: PMI

## (undated) DEVICE — DRAPE C ARM W41XL65IN UNIV W/ CLP AND RUBBERBAND

## (undated) DEVICE — COOK ENDOSCOPIC CHOLANGIOGRAPHY SET: Brand: COOK